# Patient Record
Sex: FEMALE | Race: WHITE | NOT HISPANIC OR LATINO | Employment: OTHER | URBAN - METROPOLITAN AREA
[De-identification: names, ages, dates, MRNs, and addresses within clinical notes are randomized per-mention and may not be internally consistent; named-entity substitution may affect disease eponyms.]

---

## 2017-01-26 ENCOUNTER — HOSPITAL ENCOUNTER (OUTPATIENT)
Dept: RADIOLOGY | Facility: MEDICAL CENTER | Age: 77
End: 2017-01-26
Attending: ORTHOPAEDIC SURGERY
Payer: MEDICARE

## 2017-01-26 DIAGNOSIS — M25.511 RIGHT SHOULDER PAIN, UNSPECIFIED CHRONICITY: ICD-10-CM

## 2017-01-26 PROCEDURE — 73221 MRI JOINT UPR EXTREM W/O DYE: CPT | Mod: RT

## 2017-02-07 DIAGNOSIS — Z01.812 PRE-OPERATIVE LABORATORY EXAMINATION: ICD-10-CM

## 2017-02-07 DIAGNOSIS — Z01.810 PRE-OPERATIVE CARDIOVASCULAR EXAMINATION: ICD-10-CM

## 2017-02-07 LAB
ANION GAP SERPL CALC-SCNC: 9 MMOL/L (ref 0–11.9)
BUN SERPL-MCNC: 19 MG/DL (ref 8–22)
CALCIUM SERPL-MCNC: 8.6 MG/DL (ref 8.4–10.2)
CHLORIDE SERPL-SCNC: 102 MMOL/L (ref 96–112)
CO2 SERPL-SCNC: 25 MMOL/L (ref 20–33)
CREAT SERPL-MCNC: 0.77 MG/DL (ref 0.5–1.4)
EKG IMPRESSION: NORMAL
GFR SERPL CREATININE-BSD FRML MDRD: >60 ML/MIN/1.73 M 2
GLUCOSE SERPL-MCNC: 85 MG/DL (ref 65–99)
POTASSIUM SERPL-SCNC: 4.1 MMOL/L (ref 3.6–5.5)
SODIUM SERPL-SCNC: 136 MMOL/L (ref 135–145)

## 2017-02-07 PROCEDURE — 36415 COLL VENOUS BLD VENIPUNCTURE: CPT

## 2017-02-07 PROCEDURE — 80048 BASIC METABOLIC PNL TOTAL CA: CPT

## 2017-02-07 RX ORDER — IBUPROFEN 200 MG
200 TABLET ORAL 2 TIMES DAILY
COMMUNITY
End: 2017-02-07

## 2017-02-07 RX ORDER — IBUPROFEN 200 MG
200 TABLET ORAL 2 TIMES DAILY
Status: ON HOLD | COMMUNITY
End: 2017-02-18

## 2017-02-16 ENCOUNTER — APPOINTMENT (OUTPATIENT)
Dept: RADIOLOGY | Facility: MEDICAL CENTER | Age: 77
End: 2017-02-16
Attending: HOSPITALIST
Payer: MEDICARE

## 2017-02-16 ENCOUNTER — HOSPITAL ENCOUNTER (OUTPATIENT)
Facility: MEDICAL CENTER | Age: 77
End: 2017-02-18
Attending: ORTHOPAEDIC SURGERY | Admitting: ORTHOPAEDIC SURGERY
Payer: MEDICARE

## 2017-02-16 ENCOUNTER — RESOLUTE PROFESSIONAL BILLING HOSPITAL PROF FEE (OUTPATIENT)
Dept: HOSPITALIST | Facility: MEDICAL CENTER | Age: 77
End: 2017-02-16
Payer: MEDICARE

## 2017-02-16 PROBLEM — I16.0 HYPERTENSIVE URGENCY: Status: ACTIVE | Noted: 2017-02-16

## 2017-02-16 PROBLEM — M25.511 RIGHT SHOULDER PAIN: Status: ACTIVE | Noted: 2017-02-16

## 2017-02-16 LAB
ALBUMIN SERPL BCP-MCNC: 3.9 G/DL (ref 3.2–4.9)
ALBUMIN/GLOB SERPL: 1.2 G/DL
ALP SERPL-CCNC: 59 U/L (ref 30–99)
ALT SERPL-CCNC: 17 U/L (ref 2–50)
ANION GAP SERPL CALC-SCNC: 10 MMOL/L (ref 0–11.9)
AST SERPL-CCNC: 21 U/L (ref 12–45)
BASOPHILS # BLD AUTO: 0.6 % (ref 0–1.8)
BASOPHILS # BLD: 0.05 K/UL (ref 0–0.12)
BILIRUB SERPL-MCNC: 0.5 MG/DL (ref 0.1–1.5)
BUN SERPL-MCNC: 14 MG/DL (ref 8–22)
CALCIUM SERPL-MCNC: 9.1 MG/DL (ref 8.4–10.2)
CHLORIDE SERPL-SCNC: 106 MMOL/L (ref 96–112)
CO2 SERPL-SCNC: 22 MMOL/L (ref 20–33)
CREAT SERPL-MCNC: 0.66 MG/DL (ref 0.5–1.4)
EKG IMPRESSION: NORMAL
EOSINOPHIL # BLD AUTO: 0.07 K/UL (ref 0–0.51)
EOSINOPHIL NFR BLD: 0.9 % (ref 0–6.9)
ERYTHROCYTE [DISTWIDTH] IN BLOOD BY AUTOMATED COUNT: 42.5 FL (ref 35.9–50)
GFR SERPL CREATININE-BSD FRML MDRD: >60 ML/MIN/1.73 M 2
GLOBULIN SER CALC-MCNC: 3.2 G/DL (ref 1.9–3.5)
GLUCOSE SERPL-MCNC: 130 MG/DL (ref 65–99)
HCT VFR BLD AUTO: 33.2 % (ref 37–47)
HGB BLD-MCNC: 10.8 G/DL (ref 12–16)
IMM GRANULOCYTES # BLD AUTO: 0.03 K/UL (ref 0–0.11)
IMM GRANULOCYTES NFR BLD AUTO: 0.4 % (ref 0–0.9)
LYMPHOCYTES # BLD AUTO: 1.67 K/UL (ref 1–4.8)
LYMPHOCYTES NFR BLD: 21.6 % (ref 22–41)
MCH RBC QN AUTO: 27 PG (ref 27–33)
MCHC RBC AUTO-ENTMCNC: 32.5 G/DL (ref 33.6–35)
MCV RBC AUTO: 83 FL (ref 81.4–97.8)
MONOCYTES # BLD AUTO: 0.36 K/UL (ref 0–0.85)
MONOCYTES NFR BLD AUTO: 4.7 % (ref 0–13.4)
NEUTROPHILS # BLD AUTO: 5.54 K/UL (ref 2–7.15)
NEUTROPHILS NFR BLD: 71.8 % (ref 44–72)
NRBC # BLD AUTO: 0 K/UL
NRBC BLD AUTO-RTO: 0 /100 WBC
PLATELET # BLD AUTO: 337 K/UL (ref 164–446)
PMV BLD AUTO: 9.7 FL (ref 9–12.9)
POTASSIUM SERPL-SCNC: 3.5 MMOL/L (ref 3.6–5.5)
PROT SERPL-MCNC: 7.1 G/DL (ref 6–8.2)
RBC # BLD AUTO: 4 M/UL (ref 4.2–5.4)
SODIUM SERPL-SCNC: 138 MMOL/L (ref 135–145)
TROPONIN I SERPL-MCNC: 0.03 NG/ML (ref 0–0.04)
TROPONIN I SERPL-MCNC: <0.02 NG/ML (ref 0–0.04)
WBC # BLD AUTO: 7.7 K/UL (ref 4.8–10.8)

## 2017-02-16 PROCEDURE — 700111 HCHG RX REV CODE 636 W/ 250 OVERRIDE (IP): Performed by: HOSPITALIST

## 2017-02-16 PROCEDURE — 700102 HCHG RX REV CODE 250 W/ 637 OVERRIDE(OP): Performed by: HOSPITALIST

## 2017-02-16 PROCEDURE — 93010 ELECTROCARDIOGRAM REPORT: CPT | Mod: 77 | Performed by: INTERNAL MEDICINE

## 2017-02-16 PROCEDURE — 93010 ELECTROCARDIOGRAM REPORT: CPT | Performed by: INTERNAL MEDICINE

## 2017-02-16 PROCEDURE — 96375 TX/PRO/DX INJ NEW DRUG ADDON: CPT

## 2017-02-16 PROCEDURE — 99220 PR INITIAL OBSERVATION CARE,LEVL III: CPT | Performed by: HOSPITALIST

## 2017-02-16 PROCEDURE — 80053 COMPREHEN METABOLIC PANEL: CPT

## 2017-02-16 PROCEDURE — 70450 CT HEAD/BRAIN W/O DYE: CPT

## 2017-02-16 PROCEDURE — 84484 ASSAY OF TROPONIN QUANT: CPT

## 2017-02-16 PROCEDURE — 85025 COMPLETE CBC W/AUTO DIFF WBC: CPT

## 2017-02-16 PROCEDURE — 700101 HCHG RX REV CODE 250: Performed by: HOSPITALIST

## 2017-02-16 PROCEDURE — 93005 ELECTROCARDIOGRAM TRACING: CPT | Performed by: HOSPITALIST

## 2017-02-16 PROCEDURE — 700101 HCHG RX REV CODE 250

## 2017-02-16 PROCEDURE — 96374 THER/PROPH/DIAG INJ IV PUSH: CPT

## 2017-02-16 PROCEDURE — 700111 HCHG RX REV CODE 636 W/ 250 OVERRIDE (IP)

## 2017-02-16 PROCEDURE — G0378 HOSPITAL OBSERVATION PER HR: HCPCS

## 2017-02-16 PROCEDURE — A9270 NON-COVERED ITEM OR SERVICE: HCPCS | Performed by: HOSPITALIST

## 2017-02-16 RX ORDER — LISINOPRIL 5 MG/1
10 TABLET ORAL DAILY
Status: DISCONTINUED | OUTPATIENT
Start: 2017-02-16 | End: 2017-02-16

## 2017-02-16 RX ORDER — ONDANSETRON 2 MG/ML
4 INJECTION INTRAMUSCULAR; INTRAVENOUS EVERY 6 HOURS PRN
Status: DISCONTINUED | OUTPATIENT
Start: 2017-02-16 | End: 2017-02-18 | Stop reason: HOSPADM

## 2017-02-16 RX ORDER — LABETALOL HYDROCHLORIDE 5 MG/ML
20 INJECTION, SOLUTION INTRAVENOUS ONCE
Status: ACTIVE | OUTPATIENT
Start: 2017-02-16 | End: 2017-02-17

## 2017-02-16 RX ORDER — HYDRALAZINE HYDROCHLORIDE 20 MG/ML
20 INJECTION INTRAMUSCULAR; INTRAVENOUS EVERY 6 HOURS PRN
Status: DISCONTINUED | OUTPATIENT
Start: 2017-02-16 | End: 2017-02-18 | Stop reason: HOSPADM

## 2017-02-16 RX ORDER — HYDROCHLOROTHIAZIDE 25 MG/1
12.5 TABLET ORAL
Status: DISCONTINUED | OUTPATIENT
Start: 2017-02-16 | End: 2017-02-18 | Stop reason: HOSPADM

## 2017-02-16 RX ORDER — LISINOPRIL 5 MG/1
30 TABLET ORAL DAILY
Status: DISCONTINUED | OUTPATIENT
Start: 2017-02-17 | End: 2017-02-17

## 2017-02-16 RX ORDER — DOCUSATE SODIUM 100 MG/1
100 CAPSULE, LIQUID FILLED ORAL 2 TIMES DAILY
Status: DISCONTINUED | OUTPATIENT
Start: 2017-02-16 | End: 2017-02-18 | Stop reason: HOSPADM

## 2017-02-16 RX ORDER — LABETALOL HYDROCHLORIDE 5 MG/ML
10 INJECTION, SOLUTION INTRAVENOUS EVERY 4 HOURS PRN
Status: DISCONTINUED | OUTPATIENT
Start: 2017-02-16 | End: 2017-02-18 | Stop reason: HOSPADM

## 2017-02-16 RX ORDER — LISINOPRIL 20 MG/1
20 TABLET ORAL ONCE
Status: DISCONTINUED | OUTPATIENT
Start: 2017-02-16 | End: 2017-02-17

## 2017-02-16 RX ORDER — LABETALOL HYDROCHLORIDE 5 MG/ML
INJECTION, SOLUTION INTRAVENOUS
Status: COMPLETED
Start: 2017-02-16 | End: 2017-02-16

## 2017-02-16 RX ORDER — ACETAMINOPHEN 325 MG/1
650 TABLET ORAL EVERY 4 HOURS PRN
Status: DISCONTINUED | OUTPATIENT
Start: 2017-02-16 | End: 2017-02-18 | Stop reason: HOSPADM

## 2017-02-16 RX ORDER — ONDANSETRON 4 MG/1
4 TABLET, ORALLY DISINTEGRATING ORAL EVERY 6 HOURS PRN
Status: DISCONTINUED | OUTPATIENT
Start: 2017-02-16 | End: 2017-02-18 | Stop reason: HOSPADM

## 2017-02-16 RX ORDER — HYDRALAZINE HYDROCHLORIDE 20 MG/ML
INJECTION INTRAMUSCULAR; INTRAVENOUS
Status: COMPLETED
Start: 2017-02-16 | End: 2017-02-16

## 2017-02-16 RX ORDER — SODIUM CHLORIDE, SODIUM LACTATE, POTASSIUM CHLORIDE, CALCIUM CHLORIDE 600; 310; 30; 20 MG/100ML; MG/100ML; MG/100ML; MG/100ML
INJECTION, SOLUTION INTRAVENOUS
Status: DISCONTINUED | OUTPATIENT
Start: 2017-02-16 | End: 2017-02-16

## 2017-02-16 RX ADMIN — LISINOPRIL 10 MG: 5 TABLET ORAL at 14:46

## 2017-02-16 RX ADMIN — SODIUM CHLORIDE, SODIUM LACTATE, POTASSIUM CHLORIDE, CALCIUM CHLORIDE 1000 ML: 600; 310; 30; 20 INJECTION, SOLUTION INTRAVENOUS at 11:15

## 2017-02-16 RX ADMIN — LABETALOL HYDROCHLORIDE 10 MG: 5 INJECTION, SOLUTION INTRAVENOUS at 12:30

## 2017-02-16 RX ADMIN — HYDRALAZINE HYDROCHLORIDE 20 MG: 20 INJECTION, SOLUTION INTRAMUSCULAR; INTRAVENOUS at 12:15

## 2017-02-16 RX ADMIN — HYDRALAZINE HYDROCHLORIDE 20 MG: 20 INJECTION INTRAMUSCULAR; INTRAVENOUS at 19:23

## 2017-02-16 RX ADMIN — ACETAMINOPHEN 650 MG: 325 TABLET, FILM COATED ORAL at 23:46

## 2017-02-16 RX ADMIN — ACETAMINOPHEN 650 MG: 325 TABLET, FILM COATED ORAL at 17:43

## 2017-02-16 RX ADMIN — LABETALOL HYDROCHLORIDE 10 MG: 5 INJECTION, SOLUTION INTRAVENOUS at 17:17

## 2017-02-16 RX ADMIN — HYDRALAZINE HYDROCHLORIDE 20 MG: 20 INJECTION INTRAMUSCULAR; INTRAVENOUS at 12:15

## 2017-02-16 RX ADMIN — ONDANSETRON 4 MG: 2 INJECTION, SOLUTION INTRAMUSCULAR; INTRAVENOUS at 21:15

## 2017-02-16 ASSESSMENT — PAIN SCALES - GENERAL
PAINLEVEL_OUTOF10: 5
PAINLEVEL_OUTOF10: 2

## 2017-02-16 NOTE — IP AVS SNAPSHOT
eMagin Access Code: [email or web address]  Expires: 3/20/2017 11:05 AM    eMagin  A secure, online tool to manage your health information     [employer]’s eMagin® is a secure, online tool that connects you to your personalized health information from the privacy of your home -- day or night - making it very easy for you to manage your healthcare. Once the activation process is completed, you can even access your medical information using the eMagin abbie, which is available for free in the Apple Abbie store or Google Play store.     eMagin provides the following levels of access (as shown below):   My Chart Features   [facility] Primary Care Doctor [facility]  Specialists [facility]  Urgent  Care Non-[facility]  Primary Care  Doctor   Email your healthcare team securely and privately 24/7 X X X X   Manage appointments: schedule your next appointment; view details of past/upcoming appointments X      Request prescription refills. X      View recent personal medical records, including lab and immunizations X X X X   View health record, including health history, allergies, medications X X X X   Read reports about your outpatient visits, procedures, consult and ER notes X X X X   See your discharge summary, which is a recap of your hospital and/or ER visit that includes your diagnosis, lab results, and care plan. X X       How to register for eMagin:  1. Go to  [email or web address].  2. Click on the Sign Up Now box, which takes you to the New Member Sign Up page. You will need to provide the following information:  a. Enter your eMagin Access Code exactly as it appears at the top of this page. (You will not need to use this code after you’ve completed the sign-up process. If you do not sign up before the expiration date, you must request a new code.)   b. Enter your date of birth.   c. Enter your home email address.   d. Click Submit, and follow the next screen’s instructions.  3. Create a eMagin ID. This will be your eMagin  login ID and cannot be changed, so think of one that is secure and easy to remember.  4. Create a [employer] password. You can change your password at any time.  5. Enter your Password Reset Question and Answer. This can be used at a later time if you forget your password.   6. Enter your e-mail address. This allows you to receive e-mail notifications when new information is available in SPIRIT Navigation.  7. Click Sign Up. You can now view your health information.    For assistance activating your SPIRIT Navigation account, call [phone]

## 2017-02-16 NOTE — IP AVS SNAPSHOT
2/18/2017          Nafisa Hancock  [address] [place] [address]    Dear Nafisa:    [facility] wants to ensure your discharge home is safe and you or your loved ones have had all your questions answered regarding your care after you leave the hospital.    You may receive a telephone call within two days of your discharge.  This call is to make certain you understand your discharge instructions as well as ensure we provided you with the best care possible during your stay with us.     The call will only last approximately 3-5 minutes and will be done by a nurse.    Once again, we want to ensure your discharge home is safe and that you have a clear understanding of any next steps in your care.  If you have any questions or concerns, please do not hesitate to contact us, we are here for you.  Thank you for choosing [facility] for your healthcare needs.    Sincerely,    Bryan Barbosa    [facility]

## 2017-02-16 NOTE — OR NURSING
1320- Pt eating lunch, denies pain, VSS.  1345- Pt sleeping, VSS, flushed face, changed into cloth gown, keeping Pt updated on POC.    1410- Room assigned, report called to Filomena HERNANDES, Pt transported on monitor to room 303-1.

## 2017-02-16 NOTE — OR NURSING
"930- Patient allergies and NPO status verified, home medication reconciliation completed and belongings secured. Patient verbalizes understanding of pain scale, expected course of stay and plan of care. Surgical site verified with patient. Patient and family given home care instructions sheet for discharge planning.  Elevated B/P will recheck.  940- B/P remains elevated, called placed to anesthesia.  1000- Pt relaxed and in bed, friend at bedside.  Recheck B/P still remains elevated. Surgeon and anesthesia to come speak with her.  Pt states \"no headache or weakness.\"  1030- case cancelled and will notify hospitalist.    1100- waiting return call, Pt resting comfortably.  1130- IV started, on monitor, sinus rhythm ,waiting for orders.  1215- Hospitalist at bedside with pt.  Medicated per orders.  1230- EKG complete, pt resting.  1245- friend at bedside, hand off to Kandi HERNANDES.    "

## 2017-02-16 NOTE — IP AVS SNAPSHOT
" <p align=\"LEFT\"><IMG SRC=\"//[email or web address]$/[email or web address]\" alt=\"Image\" WIDTH=\"50%\" HEIGHT=\"200\" BORDER=\"\"></p>                   Name:Nafisa Hancock  Medical Record Number:[id number]  CSN: [id number]    YOB: 1940   Age: 76 y.o.  Sex: female  HT:1.651 m (5' 5\") WT: 63 kg (138 lb 14.2 oz)          Admit Date: 2/16/2017     Discharge Date:   Today's Date: 2/18/2017  Attending Doctor:  Magdiel Lopez M.D.                  Allergies:  Nkda          Your appointments     Feb 23, 2017  1:40 PM   Established Patient with James Reyes M.D.   [address] ([address])    [address] [place] [address]  [facility] [id number]   [phone]           You will be receiving a confirmation call a few days before your appointment from our automated call confirmation system.                 Medication List      Take these Medications        Instructions    CALCIUM + D PO    Take 1,200 mg by mouth every day.   Dose:  1200 mg       hydrochlorothiazide 12.5 MG tablet   Commonly known as:  HYDRODIURIL    Take 1 Tab by mouth every day.   Dose:  12.5 mg       lisinopril 40 MG tablet   What changed:    - medication strength  - how much to take   Commonly known as:  PRINIVIL, ZESTRIL    Take 1 Tab by mouth every day.   Dose:  40 mg       medroxyPROGESTERone 5 MG Tabs   Commonly known as:  PROVERA    Take 2.5 mg by mouth every day. Daily x10d per month   Dose:  2.5 mg       MULTIVITAMINS PO    Take  by mouth every day.       PREMARIN 0.45 MG Tabs   Generic drug:  Estrogens Conjugated    Take  by mouth every day.       VITAMIN B-12 PO    Take 500 mcg by mouth every day.   Dose:  500 mcg         "

## 2017-02-17 LAB
EKG IMPRESSION: NORMAL
LV EJECT FRACT  99904: 55
LV EJECT FRACT MOD 2C 99903: 54.69
LV EJECT FRACT MOD 4C 99902: 55.94
LV EJECT FRACT MOD BP 99901: 56.36

## 2017-02-17 PROCEDURE — 700111 HCHG RX REV CODE 636 W/ 250 OVERRIDE (IP): Performed by: HOSPITALIST

## 2017-02-17 PROCEDURE — 99225 PR SUBSEQUENT OBSERVATION CARE,LEVEL II: CPT | Performed by: INTERNAL MEDICINE

## 2017-02-17 PROCEDURE — 700102 HCHG RX REV CODE 250 W/ 637 OVERRIDE(OP): Performed by: HOSPITALIST

## 2017-02-17 PROCEDURE — A9270 NON-COVERED ITEM OR SERVICE: HCPCS | Performed by: INTERNAL MEDICINE

## 2017-02-17 PROCEDURE — 93306 TTE W/DOPPLER COMPLETE: CPT | Mod: 26 | Performed by: INTERNAL MEDICINE

## 2017-02-17 PROCEDURE — 96376 TX/PRO/DX INJ SAME DRUG ADON: CPT | Mod: XU

## 2017-02-17 PROCEDURE — A9270 NON-COVERED ITEM OR SERVICE: HCPCS | Performed by: HOSPITALIST

## 2017-02-17 PROCEDURE — 93306 TTE W/DOPPLER COMPLETE: CPT

## 2017-02-17 PROCEDURE — 700102 HCHG RX REV CODE 250 W/ 637 OVERRIDE(OP): Performed by: INTERNAL MEDICINE

## 2017-02-17 PROCEDURE — G0378 HOSPITAL OBSERVATION PER HR: HCPCS

## 2017-02-17 RX ORDER — LISINOPRIL 5 MG/1
10 TABLET ORAL ONCE
Status: COMPLETED | OUTPATIENT
Start: 2017-02-17 | End: 2017-02-17

## 2017-02-17 RX ORDER — BUTALBITAL, ACETAMINOPHEN AND CAFFEINE 50; 325; 40 MG/1; MG/1; MG/1
1 TABLET ORAL EVERY 6 HOURS PRN
Status: DISCONTINUED | OUTPATIENT
Start: 2017-02-17 | End: 2017-02-18 | Stop reason: HOSPADM

## 2017-02-17 RX ORDER — POTASSIUM CHLORIDE 20 MEQ/1
40 TABLET, EXTENDED RELEASE ORAL 2 TIMES DAILY
Status: COMPLETED | OUTPATIENT
Start: 2017-02-17 | End: 2017-02-17

## 2017-02-17 RX ORDER — LISINOPRIL 20 MG/1
40 TABLET ORAL
Status: DISCONTINUED | OUTPATIENT
Start: 2017-02-18 | End: 2017-02-18 | Stop reason: HOSPADM

## 2017-02-17 RX ADMIN — BUTALBITAL, ACETAMINOPHEN, AND CAFFEINE 1 TABLET: 50; 325; 40 TABLET ORAL at 03:01

## 2017-02-17 RX ADMIN — BUTALBITAL, ACETAMINOPHEN, AND CAFFEINE 1 TABLET: 50; 325; 40 TABLET ORAL at 22:27

## 2017-02-17 RX ADMIN — HYDROCHLOROTHIAZIDE 12.5 MG: 25 TABLET ORAL at 09:12

## 2017-02-17 RX ADMIN — POTASSIUM CHLORIDE 40 MEQ: 1500 TABLET, EXTENDED RELEASE ORAL at 15:56

## 2017-02-17 RX ADMIN — LISINOPRIL 10 MG: 5 TABLET ORAL at 15:55

## 2017-02-17 RX ADMIN — HYDRALAZINE HYDROCHLORIDE 20 MG: 20 INJECTION INTRAMUSCULAR; INTRAVENOUS at 19:47

## 2017-02-17 RX ADMIN — LISINOPRIL 30 MG: 5 TABLET ORAL at 11:02

## 2017-02-17 ASSESSMENT — PAIN SCALES - GENERAL
PAINLEVEL_OUTOF10: 6
PAINLEVEL_OUTOF10: 5
PAINLEVEL_OUTOF10: 0

## 2017-02-17 NOTE — PROGRESS NOTES
Report received. Care assumed. BP rechecked 201/77, Hydralazine given by Aliya HERNANDES. No c/o SOB, chest pain, headache at this time. Assessment completed. EKG done. Ambulate to bathroom to void, standby assist gait steady. Call light in reach.

## 2017-02-17 NOTE — PROGRESS NOTES
Continues to c/o headache. New order received, medicated per MAR. /70's. Call light in reach.     Tele Report:   Sinus Rhythm/ Sinus Tach  Rare PVC  HR   AR 0.16  QRS 0.10  QT 0.36

## 2017-02-17 NOTE — PROGRESS NOTES
Resting with eyes closed. Respirations even and unlabored. /56. Tylenol given for headache at 0000, no further c/o. No further c/o nausea. Call light in reach.

## 2017-02-17 NOTE — PROGRESS NOTES
C/o nausea. Dr. Castro notified, orders received. Medicated per MAR. Assist to bathroom to void and have BM. BP remains 170/90's. Call light in reach.

## 2017-02-17 NOTE — PROGRESS NOTES
Resting with eyes closed. Respirations even and unlabored. Repositions self. No further c/o pain. Call light in reach.

## 2017-02-17 NOTE — PROGRESS NOTES
To CT via w/c, tolerated well. No further c/o nausea or vomiting. Refused additional BP meds as /66. Denies headache at this time. Call light in reach.

## 2017-02-17 NOTE — PROGRESS NOTES
Hospital Medicine Progress Note, Adult, Complex               Author: Magdiel Lopez Date & Time created: 2/17/2017  1:39 PM   CC: hypertensive urgency  Interval History:  Nafisa Hancock is a 76 year old woman who was admitted last night for HTNsive urgency. She was admitted for an elective R rotator cuff repair. BP didn't improve As an outpt, she was getting Lisinopril 10 mg po daily. She was asymptomatic. Labs were unremarkable except for K of 3.5, RBS of 130, Hgb 10.8, . Troponins were normal. EKG showed NSR , early transition, NS ST changes. Lisinopril was restarted but the dose was increased to 30 daily. HCTZ was added as well.     Review of Systems:  ROS   Pertinent positives/negative as mentioned above.     A complete review of systems was done. All other systems were negative.     Physical Exam:  Physical Exam   Constitutional: Well-developed, well-nourished, (-) diaphoresis, (-) distress  HENT: Normocephalic, atraumatic, (-) tonsillopharyngal congestion, (-) tonsillopharyngeal exudate  Eyes: PERRLA, pink conjuctivae, (-) icteric sclerae  Neck: (-) cervical lymphadenopathy, (-) rigidity  Cardiovascular: RRR, (-) murmurs, (-) rubs, (-) gallops, (-) edema  Pulmonary: Equal chest expansion, (+) clear breath sounds, (-) wheezes/rhonchi, (-) crackles/rales  Abdominal: (+) bowel sounds, soft, (-) tenderness, (-) masses, (-) guarding/rebound  Musculoskeletal: (-) joint swelling, (-) joint tenderness, (-) joint deformities, (-) muscle tenderness, (-) gross limitation of movement of 4 extremities  Neurologic: Non-focal, moves all 4 extremities, sensory grossly intact  Skin: (-) erythema, (-) warmth, (-) rashes, (-) ulcers, (-) open wounds  Psychiatric: mood/affect WNL, thought content WNL, behavior age appropriate    Labs:        Invalid input(s): [id number]  Recent Labs      02/16/17   1255  02/16/17   1834   TROPONINI  <0.02  0.03     Recent Labs      02/16/17   1255   SODIUM  138   POTASSIUM  3.5*   CHLORIDE   106   CO2  22   BUN  14   CREATININE  0.66   CALCIUM  9.1     Recent Labs      17   1255   ALTSGPT  17   ASTSGOT  21   ALKPHOSPHAT  59   TBILIRUBIN  0.5   GLUCOSE  130*     Recent Labs      17   1255   RBC  4.00*   HEMOGLOBIN  10.8*   HEMATOCRIT  33.2*   PLATELETCT  337     Recent Labs      17   1255   WBC  7.7   NEUTSPOLYS  71.80   LYMPHOCYTES  21.60*   MONOCYTES  4.70   EOSINOPHILS  0.90   BASOPHILS  0.60   ASTSGOT  21   ALTSGPT  17   ALKPHOSPHAT  59   TBILIRUBIN  0.5           Hemodynamics:  Temp (24hrs), Av.9 °C (98.5 °F), Min:36.8 °C (98.2 °F), Max:37.1 °C (98.8 °F)  Temperature:  (95.6)  Pulse  Av.5  Min: 68  Max: 110Heart Rate (Monitored): 98  Blood Pressure : (!) 172/69 mmHg     Respiratory:    Respiration: 18, Pulse Oximetry:  (95% room air)           Fluids:    Intake/Output Summary (Last 24 hours) at 17 1339  Last data filed at 17 0500   Gross per 24 hour   Intake    770 ml   Output      0 ml   Net    770 ml     Weight: 63 kg (138 lb 14.2 oz)  GI/Nutrition:  Orders Placed This Encounter   Procedures   • Diet Order     Standing Status: Standing      Number of Occurrences: 1      Standing Expiration Date:      Order Specific Question:  Diet:     Answer:  Regular [1]     Medical Decision Making, by Problem:  Active Hospital Problems    Diagnosis   • Hypertensive urgency [I16.0]  hypokalemia  Inc lisnopril 30 dly  HCTZ 12.5 daily  PRN Labetalol and Hydralazine.   ECHO was ordered  Replace K  Check BMP,MG tomorrow  telemetry     • Right shoulder pain [M25.511]       Core Measures  SCD  Full code

## 2017-02-17 NOTE — PROGRESS NOTES
Patient c/o headache 5/10 BP at this time (1713) 182/111 and HR 99. Ronit hospitalist RN notified, PRN 10 mg labatolol given IVP and order for tylenol received for headache. BP recheched at 1735 now 175/93 and HR 81 will recheck BP again in 15 minutes

## 2017-02-18 VITALS
TEMPERATURE: 98.7 F | DIASTOLIC BLOOD PRESSURE: 64 MMHG | HEIGHT: 65 IN | OXYGEN SATURATION: 93 % | RESPIRATION RATE: 18 BRPM | BODY MASS INDEX: 23.14 KG/M2 | HEART RATE: 94 BPM | SYSTOLIC BLOOD PRESSURE: 142 MMHG | WEIGHT: 138.89 LBS

## 2017-02-18 PROBLEM — I16.0 HYPERTENSIVE URGENCY: Status: RESOLVED | Noted: 2017-02-16 | Resolved: 2017-02-18

## 2017-02-18 LAB
ANION GAP SERPL CALC-SCNC: 9 MMOL/L (ref 0–11.9)
BUN SERPL-MCNC: 12 MG/DL (ref 8–22)
CALCIUM SERPL-MCNC: 9.2 MG/DL (ref 8.4–10.2)
CHLORIDE SERPL-SCNC: 103 MMOL/L (ref 96–112)
CO2 SERPL-SCNC: 24 MMOL/L (ref 20–33)
CREAT SERPL-MCNC: 0.84 MG/DL (ref 0.5–1.4)
GFR SERPL CREATININE-BSD FRML MDRD: >60 ML/MIN/1.73 M 2
GLUCOSE SERPL-MCNC: 108 MG/DL (ref 65–99)
MAGNESIUM SERPL-MCNC: 1.8 MG/DL (ref 1.5–2.5)
POTASSIUM SERPL-SCNC: 4.2 MMOL/L (ref 3.6–5.5)
SODIUM SERPL-SCNC: 136 MMOL/L (ref 135–145)

## 2017-02-18 PROCEDURE — A9270 NON-COVERED ITEM OR SERVICE: HCPCS | Performed by: HOSPITALIST

## 2017-02-18 PROCEDURE — 99217 PR OBSERVATION CARE DISCHARGE: CPT | Performed by: INTERNAL MEDICINE

## 2017-02-18 PROCEDURE — 80048 BASIC METABOLIC PNL TOTAL CA: CPT

## 2017-02-18 PROCEDURE — 700102 HCHG RX REV CODE 250 W/ 637 OVERRIDE(OP): Performed by: HOSPITALIST

## 2017-02-18 PROCEDURE — 700102 HCHG RX REV CODE 250 W/ 637 OVERRIDE(OP): Performed by: INTERNAL MEDICINE

## 2017-02-18 PROCEDURE — A9270 NON-COVERED ITEM OR SERVICE: HCPCS | Performed by: INTERNAL MEDICINE

## 2017-02-18 PROCEDURE — G0378 HOSPITAL OBSERVATION PER HR: HCPCS

## 2017-02-18 PROCEDURE — 83735 ASSAY OF MAGNESIUM: CPT

## 2017-02-18 RX ORDER — LISINOPRIL 40 MG/1
40 TABLET ORAL DAILY
Qty: 30 TAB | Refills: 0 | Status: SHIPPED | OUTPATIENT
Start: 2017-02-18 | End: 2017-02-23 | Stop reason: SDUPTHER

## 2017-02-18 RX ORDER — HYDROCHLOROTHIAZIDE 12.5 MG/1
12.5 TABLET ORAL DAILY
Qty: 30 TAB | Refills: 0 | Status: SHIPPED | OUTPATIENT
Start: 2017-02-18 | End: 2017-02-23 | Stop reason: SDUPTHER

## 2017-02-18 RX ADMIN — HYDROCHLOROTHIAZIDE 12.5 MG: 25 TABLET ORAL at 08:39

## 2017-02-18 RX ADMIN — ACETAMINOPHEN 650 MG: 325 TABLET, FILM COATED ORAL at 08:40

## 2017-02-18 RX ADMIN — LISINOPRIL 40 MG: 20 TABLET ORAL at 08:39

## 2017-02-18 RX ADMIN — ACETAMINOPHEN 650 MG: 325 TABLET, FILM COATED ORAL at 03:32

## 2017-02-18 ASSESSMENT — LIFESTYLE VARIABLES
EVER_SMOKED: NEVER
EVER_SMOKED: NEVER
ALCOHOL_USE: NO

## 2017-02-18 ASSESSMENT — PAIN SCALES - GENERAL: PAINLEVEL_OUTOF10: 6

## 2017-02-18 NOTE — PROGRESS NOTES
Report received. Care assumed. Resting in bed. Alert and oriented. Respirations even and unlabored. No c/o. Call light in reach.

## 2017-02-18 NOTE — PROGRESS NOTES
Resting with eyes closed. Respirations even and unlabored. No further c/o pain. Ambulate to bathroom as needed, gait steady, calls for assist as needed. Call light in reach.

## 2017-02-18 NOTE — DISCHARGE SUMMARY
HOSPITAL MEDICINE DISCHARGE SUMMARY    Name: Nafisa Hancock  MRN: [id number]  : 1940  Admit Date: 2017  Discharge Date: 2017  Attending Provider: Magdiel Lopez M.D.  Primary Care Physician: James Reyes M.D.    DISCHARGE DIAGNOSES:   Active Problems:    Right shoulder pain POA: Yes  Resolved Problems:    Hypertensive urgency POA: Yes        SUMMARY OF EVENTS LEADING TO ADMISSION:   76 year old woman who was admitted or HTNsive urgency     For further details of history of present illness, past medical/social/family histories, allergies and medication, please refer to copy of admission note by Dr. Castro on 17.     HOSPITAL COURSE:   The patient was admitted to the hospitalist service  after she presented with Hypertensive urgency. She was supposed to undergo an elective R rotator cuff repair but her BP was more than 200 systolic.  She is known to have HTN and was on Lisinopril 10 mg at home. Troponins were normal. EKG showed NSR , early transition, NS ST changes.     Lisinopril was increased to 40 daily. HCTZ 12.5 mg was added as well. BP this moring is down in the 140's. She is asymptomatic. She requested to go home.       With her clinical improvement, she was deemed ready to be discharged from the hospital as she did not have any further inpatient needs. Patient felt comfortable going home. The discharge plan was discussed with the patient, and she was agreeable to it.     The patient was subsequently sent home in improved and stable condition.     FOLLOW-UP ISSUES:   BP     CHANGES IN MANAGEMENT OF CHRONIC CONDITION: As above.         DISCHARGE MEDICATIONS:   Prior to Admission medications    Medication Sig Start Date End Date Taking? Authorizing Provider   lisinopril (PRINIVIL, ZESTRIL) 40 MG tablet Take 1 Tab by mouth every day. 17  Yes Magdiel Lopez M.D.   hydrochlorothiazide (HYDRODIURIL) 12.5 MG tablet Take 1 Tab by mouth every day. 17  Yes  Magdiel Lopez M.D.   Calcium Carbonate-Vitamin D (CALCIUM + D PO) Take 1,200 mg by mouth every day.    Srg Physician   medroxyPROGESTERone (PROVERA) 5 MG TABS Take 2.5 mg by mouth every day. Daily x10d per month    Srg Physician   Estrogens Conjugated (PREMARIN) 0.45 MG TABS Take  by mouth every day.    Srg Physician   Multiple Vitamin (MULTIVITAMINS PO) Take  by mouth every day.    Srg Physician   Cyanocobalamin (VITAMIN B-12 PO) Take 500 mcg by mouth every day.    Srg Physician          FOLLOW-UP APPOINTMENTS:   1. James Reyes M.D.in a week        For further details on discharge medications, patient education, diet, and activity, please refer to electronic copy of discharge instructions.       TIME SPENT: 25 minutes, with greater than 50% of the time spent on face-to-face encounter, addressing medical issues, coordination of care, counseling, discharge planning, medication reconciliation, and documentation.

## 2017-02-18 NOTE — PROGRESS NOTES
Refuse due senna. IV hydralazine given at 2000, BP recheck now 147/47. No c/o. Ambulate to bathroom as needed. Call light in reach.

## 2017-02-18 NOTE — PROGRESS NOTES
Alert and oriented, complain of headache, tylenol given, VSS, POC discussed.    Seen by MD possible D/C today.

## 2017-02-18 NOTE — PROGRESS NOTES
Medicated per MAR for 5/10 headache pain. Respirations even and unlabored. /63. Call light in reach.

## 2017-02-18 NOTE — PROGRESS NOTES
Resting in bed. Easily aroused to sound or touch. No c/o. /57. No further c/o headache. Call light in reach.     Tele Report:     Sinus rhythm/ Sinus Tach  HR   No ectopy  AK 0.14  QRS 0.08  QT 0.34

## 2017-02-18 NOTE — CARE PLAN
Problem: Safety  Goal: Will remain free from injury  Outcome: PROGRESSING AS EXPECTED  Intervention: Provide assistance with mobility  Encourage to call for any assistance needed, call light within reach.      Problem: Knowledge Deficit  Goal: Knowledge of disease process/condition, treatment plan, diagnostic tests, and medications will improve  Outcome: PROGRESSING AS EXPECTED  Intervention: Explain information regarding disease process/condition, treatment plan, diagnostic tests, and medications and document in education  Updated with plan of care, possible D/C today.

## 2017-02-18 NOTE — CARE PLAN
Problem: Safety  Goal: Will remain free from injury  Outcome: PROGRESSING AS EXPECTED  Oriented to room and equipment. Call light instruction given, in reach at all times. Slipper socks in place. Floor dry and uncluttered. Bed locked and in lowest position.     Problem: Knowledge Deficit  Goal: Knowledge of disease process/condition, treatment plan, diagnostic tests, and medications will improve  Outcome: PROGRESSING AS EXPECTED  Plan of care discussed with patient. Opportunity given for questions. States understanding.     Problem: Pain Management  Goal: Pain level will decrease to patient’s comfort goal  Outcome: PROGRESSING AS EXPECTED  Uses 0-10 scale appropriately. Pharmacologic and nonpharmacologic interventions in place.

## 2017-02-18 NOTE — DISCHARGE INSTRUCTIONS
Discharge Instructions    Discharged to home by car with relative. Discharged via wheelchair, hospital escort: Yes.  Special equipment needed: Not Applicable    Be sure to schedule a follow-up appointment with your primary care doctor or any specialists as instructed.     Discharge Plan:   Diet Plan: Discussed  Activity Level: Discussed  Confirmed Follow up Appointment: Patient to Call and Schedule Appointment  Confirmed Symptoms Management: Discussed  Medication Reconciliation Updated: Yes  Influenza Vaccine Indication: Not indicated: Previously immunized this influenza season and > 8 years of age    I understand that a diet low in cholesterol, fat, and sodium is recommended for good health. Unless I have been given specific instructions below for another diet, I accept this instruction as my diet prescription.   Other diet: regular    Special Instructions: Hypertension  Hypertension, commonly called high blood pressure, is when the force of blood pumping through your arteries is too strong. Your arteries are the blood vessels that carry blood from your heart throughout your body. A blood pressure reading consists of a higher number over a lower number, such as 110/72. The higher number (systolic) is the pressure inside your arteries when your heart pumps. The lower number (diastolic) is the pressure inside your arteries when your heart relaxes. Ideally you want your blood pressure below 120/80.  Hypertension forces your heart to work harder to pump blood. Your arteries may become narrow or stiff. Having untreated or uncontrolled hypertension can cause heart attack, stroke, kidney disease, and other problems.  RISK FACTORS  Some risk factors for high blood pressure are controllable. Others are not.   Risk factors you cannot control include:   · Race. You may be at higher risk if you are .  · Age. Risk increases with age.  · Gender. Men are at higher risk than women before age 45 years. After age 65,  women are at higher risk than men.  Risk factors you can control include:  · Not getting enough exercise or physical activity.  · Being overweight.  · Getting too much fat, sugar, calories, or salt in your diet.  · Drinking too much alcohol.  SIGNS AND SYMPTOMS  Hypertension does not usually cause signs or symptoms. Extremely high blood pressure (hypertensive crisis) may cause headache, anxiety, shortness of breath, and nosebleed.  DIAGNOSIS  To check if you have hypertension, your health care provider will measure your blood pressure while you are seated, with your arm held at the level of your heart. It should be measured at least twice using the same arm. Certain conditions can cause a difference in blood pressure between your right and left arms. A blood pressure reading that is higher than normal on one occasion does not mean that you need treatment. If it is not clear whether you have high blood pressure, you may be asked to return on a different day to have your blood pressure checked again. Or, you may be asked to monitor your blood pressure at home for 1 or more weeks.  TREATMENT  Treating high blood pressure includes making lifestyle changes and possibly taking medicine. Living a healthy lifestyle can help lower high blood pressure. You may need to change some of your habits.  Lifestyle changes may include:  · Following the DASH diet. This diet is high in fruits, vegetables, and whole grains. It is low in salt, red meat, and added sugars.  · Keep your sodium intake below 2,300 mg per day.  · Getting at least 30-45 minutes of aerobic exercise at least 4 times per week.  · Losing weight if necessary.  · Not smoking.  · Limiting alcoholic beverages.  · Learning ways to reduce stress.  Your health care provider may prescribe medicine if lifestyle changes are not enough to get your blood pressure under control, and if one of the following is true:  · You are 18-59 years of age and your systolic blood pressure  is above 140.  · You are 60 years of age or older, and your systolic blood pressure is above 150.  · Your diastolic blood pressure is above 90.  · You have diabetes, and your systolic blood pressure is over 140 or your diastolic blood pressure is over 90.  · You have kidney disease and your blood pressure is above 140/90.  · You have heart disease and your blood pressure is above 140/90.  Your personal target blood pressure may vary depending on your medical conditions, your age, and other factors.  HOME CARE INSTRUCTIONS  · Have your blood pressure rechecked as directed by your health care provider.    · Take medicines only as directed by your health care provider. Follow the directions carefully. Blood pressure medicines must be taken as prescribed. The medicine does not work as well when you skip doses. Skipping doses also puts you at risk for problems.  · Do not smoke.    · Monitor your blood pressure at home as directed by your health care provider.   SEEK MEDICAL CARE IF:   · You think you are having a reaction to medicines taken.  · You have recurrent headaches or feel dizzy.  · You have swelling in your ankles.  · You have trouble with your vision.  SEEK IMMEDIATE MEDICAL CARE IF:  · You develop a severe headache or confusion.  · You have unusual weakness, numbness, or feel faint.  · You have severe chest or abdominal pain.  · You vomit repeatedly.  · You have trouble breathing.  MAKE SURE YOU:   · Understand these instructions.  · Will watch your condition.  · Will get help right away if you are not doing well or get worse.     This information is not intended to replace advice given to you by your health care provider. Make sure you discuss any questions you have with your health care provider.     Document Released: 12/18/2006 Document Revised: 05/03/2016 Document Reviewed: 10/10/2014  [employer] Interactive Patient Education ©2016 [employer]      · Is patient discharged on Warfarin / Coumadin?   No      · Is patient Post Blood Transfusion?  No    Depression / Suicide Risk    As you are discharged from this [facility] facility, it is important to learn how to keep safe from harming yourself.    Recognize the warning signs:  · Abrupt changes in personality, positive or negative- including increase in energy   · Giving away possessions  · Change in eating patterns- significant weight changes-  positive or negative  · Change in sleeping patterns- unable to sleep or sleeping all the time   · Unwillingness or inability to communicate  · Depression  · Unusual sadness, discouragement and loneliness  · Talk of wanting to die  · Neglect of personal appearance   · Rebelliousness- reckless behavior  · Withdrawal from people/activities they love  · Confusion- inability to concentrate     If you or a loved one observes any of these behaviors or has concerns about self-harm, here's what you can do:  · Talk about it- your feelings and reasons for harming yourself  · Remove any means that you might use to hurt yourself (examples: pills, rope, extension cords, firearm)  · Get professional help from the community (Mental Health, Substance Abuse, psychological counseling)  · Do not be alone:Call your Safe Contact- someone whom you trust who will be there for you.  · Call your local CRISIS HOTLINE 152-0502 or 506-261-9536  · Call your local Children's Mobile Crisis Response Team [place] (288) 533-2437 or www.Emida  · Call the toll free National Suicide Prevention Hotlines   · National Suicide Prevention Lifeline 530-650-TIRV (1953)  · National Hope Line Network 800-SUICIDE (283-1974)

## 2017-02-18 NOTE — PROGRESS NOTES
Discharge to home, discharge instructions and prescriptions given, explained and understood, accompanied by .

## 2017-02-23 ENCOUNTER — OFFICE VISIT (OUTPATIENT)
Dept: MEDICAL GROUP | Facility: MEDICAL CENTER | Age: 77
End: 2017-02-23
Payer: MEDICARE

## 2017-02-23 VITALS
DIASTOLIC BLOOD PRESSURE: 76 MMHG | OXYGEN SATURATION: 96 % | BODY MASS INDEX: 22.89 KG/M2 | HEIGHT: 65 IN | WEIGHT: 137.4 LBS | SYSTOLIC BLOOD PRESSURE: 116 MMHG | TEMPERATURE: 98.1 F | HEART RATE: 85 BPM | RESPIRATION RATE: 16 BRPM

## 2017-02-23 DIAGNOSIS — Z01.810 PREOP CARDIOVASCULAR EXAM: ICD-10-CM

## 2017-02-23 DIAGNOSIS — Z79.891 CHRONIC PRESCRIPTION OPIATE USE: ICD-10-CM

## 2017-02-23 DIAGNOSIS — M19.90 ARTHRITIS: ICD-10-CM

## 2017-02-23 DIAGNOSIS — M25.511 ACUTE PAIN OF RIGHT SHOULDER: ICD-10-CM

## 2017-02-23 DIAGNOSIS — I10 ESSENTIAL HYPERTENSION: ICD-10-CM

## 2017-02-23 DIAGNOSIS — Z12.31 VISIT FOR SCREENING MAMMOGRAM: ICD-10-CM

## 2017-02-23 PROCEDURE — G8482 FLU IMMUNIZE ORDER/ADMIN: HCPCS | Performed by: INTERNAL MEDICINE

## 2017-02-23 PROCEDURE — G8419 CALC BMI OUT NRM PARAM NOF/U: HCPCS | Performed by: INTERNAL MEDICINE

## 2017-02-23 PROCEDURE — 1101F PT FALLS ASSESS-DOCD LE1/YR: CPT | Performed by: INTERNAL MEDICINE

## 2017-02-23 PROCEDURE — 4040F PNEUMOC VAC/ADMIN/RCVD: CPT | Performed by: INTERNAL MEDICINE

## 2017-02-23 PROCEDURE — 99214 OFFICE O/P EST MOD 30 MIN: CPT | Performed by: INTERNAL MEDICINE

## 2017-02-23 PROCEDURE — G8432 DEP SCR NOT DOC, RNG: HCPCS | Performed by: INTERNAL MEDICINE

## 2017-02-23 PROCEDURE — 1036F TOBACCO NON-USER: CPT | Performed by: INTERNAL MEDICINE

## 2017-02-23 RX ORDER — HYDROCODONE BITARTRATE AND ACETAMINOPHEN 10; 325 MG/1; MG/1
1 TABLET ORAL EVERY 8 HOURS PRN
Qty: 90 TAB | Refills: 0 | Status: SHIPPED | OUTPATIENT
Start: 2017-02-23 | End: 2017-02-23 | Stop reason: SDUPTHER

## 2017-02-23 RX ORDER — HYDROCODONE BITARTRATE AND ACETAMINOPHEN 10; 325 MG/1; MG/1
1 TABLET ORAL EVERY 8 HOURS PRN
Qty: 90 TAB | Refills: 0 | Status: SHIPPED | OUTPATIENT
Start: 2017-02-23 | End: 2017-05-16 | Stop reason: SDUPTHER

## 2017-02-23 RX ORDER — HYDROCHLOROTHIAZIDE 12.5 MG/1
12.5 TABLET ORAL DAILY
Qty: 30 TAB | Refills: 6 | Status: SHIPPED | OUTPATIENT
Start: 2017-02-23 | End: 2018-05-21 | Stop reason: SDUPTHER

## 2017-02-23 RX ORDER — LISINOPRIL 40 MG/1
40 TABLET ORAL DAILY
Qty: 30 TAB | Refills: 6 | Status: SHIPPED | OUTPATIENT
Start: 2017-02-23 | End: 2017-11-16 | Stop reason: SDUPTHER

## 2017-02-23 ASSESSMENT — ENCOUNTER SYMPTOMS: DEPRESSION: 0

## 2017-02-23 ASSESSMENT — LIFESTYLE VARIABLES: HISTORY_ALCOHOL_USE: 0

## 2017-02-23 NOTE — MR AVS SNAPSHOT
"        Nafisa Hancock   2017 1:40 PM   Office Visit   MRN: [id number]    Department:  [address] [place] [address]   Dept Phone:  [phone]    Description:  Female : 1940   Provider:  James Reyes M.D.           Allergies as of 2017     Allergen Noted Reactions    Nkda [No Known Drug Allergy] 2009         You were diagnosed with     Chronic prescription opiate use   [5308573]       Arthritis   [005266]       Preop cardiovascular exam   [210079]       Acute pain of right shoulder   [7441534]       Essential hypertension   [1660614]       Visit for screening mammogram   [047988]         Vital Signs     Blood Pressure Pulse Temperature Respirations Height Weight    116/76 mmHg 85 36.7 °C (98.1 °F) 16 1.651 m (5' 5\") 62.324 kg (137 lb 6.4 oz)    Body Mass Index Oxygen Saturation Last Menstrual Period Smoking Status          22.86 kg/m2 96% 2017 (Exact Date) Never Smoker         Basic Information     Date Of Birth Sex Race Ethnicity Preferred Language    1940 Female White Non- English      Your appointments     May 16, 2017  3:00 PM   Established Patient with James Reyes M.D.   [facility] [address]    [address] [facility] [address]  [facility] [id number] [phone]           You will be receiving a confirmation call a few days before your appointment from our automated call confirmation system.              Problem List              ICD-10-CM Priority Class Noted - Resolved    Osteoporosis, senile (Chronic) M81.0   2010 - Present    Arthritis M19.90   2011 - Present    Vitamin d deficiency    2012 - Present    Chronic prescription opiate use Z79.891   2015 - Present    Right shoulder pain M25.511   2017 - Present    Essential hypertension I10   2017 - Present      Health Maintenance        Date Due Completion Dates    MAMMOGRAM 2017, 2015, 2013, 2011, 2010, 2010, 2010, 6/3/2009, " 6/3/2009    BONE DENSITY 10/30/2018 10/30/2013, 3/31/2008    IMM DTaP/Tdap/Td Vaccine (2 - Td) 5/10/2022 5/10/2012    COLONOSCOPY 6/15/2022 6/15/2012 (Done), 5/10/2012 (Declined)    Override on 6/15/2012: Done    Override on 5/10/2012: Patient Declined            Current Immunizations     13-VALENT PCV PREVNAR 11/18/2015    Influenza TIV (IM) 9/16/2014, 9/14/2013, 10/22/2010    Influenza Vaccine Adult HD 11/29/2016, 9/26/2015    Pneumococcal Vaccine (UF)Historical Data 5/24/2006    Pneumococcal polysaccharide vaccine (PPSV-23) 11/29/2016, 5/24/2006    SHINGLES VACCINE 2/23/2016    Tdap Vaccine 5/10/2012      Below and/or attached are the medications your provider expects you to take. Review all of your home medications and newly ordered medications with your provider and/or pharmacist. Follow medication instructions as directed by your provider and/or pharmacist. Please keep your medication list with you and share with your provider. Update the information when medications are discontinued, doses are changed, or new medications (including over-the-counter products) are added; and carry medication information at all times in the event of emergency situations     Allergies:  NKDA - (reactions not documented)               Medications  Valid as of: February 23, 2017 -  1:50 PM    Generic Name Brand Name Tablet Size Instructions for use    Calcium Citrate-Vitamin D   Take 1,200 mg by mouth every day.        Cyanocobalamin   Take 500 mcg by mouth every day.        Estrogens Conjugated (Tab) PREMARIN 0.45 MG Take  by mouth every day.        HydroCHLOROthiazide (Tab) HYDRODIURIL 12.5 MG Take 1 Tab by mouth every day.        Hydrocodone-Acetaminophen (Tab) NORCO  MG Take 1 Tab by mouth every 8 hours as needed for up to 30 days.        Lisinopril (Tab) PRINIVIL, ZESTRIL 40 MG Take 1 Tab by mouth every day.        MedroxyPROGESTERone Acetate (Tab) PROVERA 5 MG Take 2.5 mg by mouth every day. Daily x10d per month         Multiple Vitamin   Take  by mouth every day.        .                 Medicines prescribed today were sent to:     Health system [facility]Y [address] [place] [address] (), [place] - [address]    [address] () [place] [address]    Phone: [phone] Fax: [phone]    Open 24 Hours?: No      Medication refill instructions:       If your prescription bottle indicates you have medication refills left, it is not necessary to call your provider’s office. Please contact your pharmacy and they will refill your medication.    If your prescription bottle indicates you do not have any refills left, you may request refills at any time through one of the following ways: The online NiftyThrifty system (except Urgent Care), by calling your provider’s office, or by asking your pharmacy to contact your provider’s office with a refill request. Medication refills are processed only during regular business hours and may not be available until the next business day. Your provider may request additional information or to have a follow-up visit with you prior to refilling your medication.   *Please Note: Medication refills are assigned a new Rx number when refilled electronically. Your pharmacy may indicate that no refills were authorized even though a new prescription for the same medication is available at the pharmacy. Please request the medicine by name with the pharmacy before contacting your provider for a refill.        Your To Do List     Future Labs/Procedures Complete By Expires    MA-SCREEN MAMMO W/CAD-BILAT  As directed 3/27/2018      Other Notes About Your Plan     Patient Enrolled in [facility] with Dr.John Reyes.   patient has been on Norco for her arthritis pain since 2009. ? Opioid dependence, continuous code 304.01                      NiftyThrifty Access Code: [email or web address]  Expires: 3/20/2017 11:05 AM    NiftyThrifty  A secure, online tool to manage your health information     [employer]’s NiftyThrifty® is a secure, online tool  that connects you to your personalized health information from the privacy of your home -- day or night - making it very easy for you to manage your healthcare. Once the activation process is completed, you can even access your medical information using the divorce360 abbie, which is available for free in the Apple Abbie store or Google Play store.     divorce360 provides the following levels of access (as shown below):   My Chart Features   [facility] Doctor [facility]  Specialists [facility]  Urgent  Care [facility]  Primary Care  Doctor   Email your healthcare team securely and privately 24/7 X X X    Manage appointments: schedule your next appointment; view details of past/upcoming appointments X      Request prescription refills. X      View recent personal medical records, including lab and immunizations X X X X   View health record, including health history, allergies, medications X X X X   Read reports about your outpatient visits, procedures, consult and ER notes X X X X   See your discharge summary, which is a recap of your hospital and/or ER visit that includes your diagnosis, lab results, and care plan. X X       How to register for divorce360:  1. Go to  [email or web address].  2. Click on the Sign Up Now box, which takes you to the New Member Sign Up page. You will need to provide the following information:  a. Enter your divorce360 Access Code exactly as it appears at the top of this page. (You will not need to use this code after you’ve completed the sign-up process. If you do not sign up before the expiration date, you must request a new code.)   b. Enter your date of birth.   c. Enter your home email address.   d. Click Submit, and follow the next screen’s instructions.  3. Create a divorce360 ID. This will be your divorce360 login ID and cannot be changed, so think of one that is secure and easy to remember.  4. Create a divorce360 password. You can change your password at any time.  5. Enter your Password Reset  Question and Answer. This can be used at a later time if you forget your password.   6. Enter your e-mail address. This allows you to receive e-mail notifications when new information is available in [employer].  7. Click Sign Up. You can now view your health information.    For assistance activating your [employer] account, call [phone]

## 2017-02-23 NOTE — PROGRESS NOTES
CC: Follow-up chronic pain and hypertension.    HPI:   Nafisa presents today with the following.    1. Chronic prescription opiate use  Analgesia:  not changed  Activity:  not changed  Adverse Events: None  Aberrant Behaviors: None  Affect/Mood: normal speech pattern and content  Last dose medication today    Opioid Risk Score: 0    Interpretation of Opioid Risk Score   Score 0-3 = Low risk of abuse. Do UDS at least once per year.  Score 4-7 = Moderate risk of abuse. Do UDS 1-4 times per year.  Score 8+ = High risk of abuse. Refer to specialist.      2. Arthritis  Reports pain is stable taking 1-3 Oscar per day. Denying any excessive confusion.    3. Preop cardiovascular exam  She was about to have surgery without have elevated blood pressure and is eating release    4. Acute pain of right shoulder  Again plan for shoulder surgery with orthopedics.    5. Essential hypertension  The day of procedure blood pressure was up to 200 systolic. She had no chest pain or shortness of breath no palpitations. Lisinopril was increased to 40 mg and added 12.5 mg of hydrochlorothiazide. She is tolerating well without dizziness brings a blood pressure log into the office with good readings. She does report being anxious the day of procedure.    6. Visit for screening mammogram        Patient Active Problem List    Diagnosis Date Noted   • Essential hypertension 02/23/2017   • Right shoulder pain 02/16/2017   • Chronic prescription opiate use 02/18/2015   • Vitamin d deficiency 09/17/2012   • Arthritis 05/24/2011   • Osteoporosis, senile 06/18/2010       Current Outpatient Prescriptions   Medication Sig Dispense Refill   • hydrocodone/acetaminophen (NORCO)  MG Tab Take 1 Tab by mouth every 8 hours as needed for up to 30 days. 90 Tab 0   • lisinopril (PRINIVIL, ZESTRIL) 40 MG tablet Take 1 Tab by mouth every day. 30 Tab 6   • hydrochlorothiazide (HYDRODIURIL) 12.5 MG tablet Take 1 Tab by mouth every day. 30 Tab 6   • Calcium  "Carbonate-Vitamin D (CALCIUM + D PO) Take 1,200 mg by mouth every day.     • medroxyPROGESTERone (PROVERA) 5 MG TABS Take 2.5 mg by mouth every day. Daily x10d per month     • Estrogens Conjugated (PREMARIN) 0.45 MG TABS Take  by mouth every day.     • Multiple Vitamin (MULTIVITAMINS PO) Take  by mouth every day.     • Cyanocobalamin (VITAMIN B-12 PO) Take 500 mcg by mouth every day.       No current facility-administered medications for this visit.         Allergies as of 02/23/2017 - Leonard as Reviewed 02/23/2017   Allergen Reaction Noted   • Nkda [no known drug allergy]  09/22/2009        ROS: As per HPI.    /76 mmHg  Pulse 85  Temp(Src) 36.7 °C (98.1 °F)  Resp 16  Ht 1.651 m (5' 5\")  Wt 62.324 kg (137 lb 6.4 oz)  BMI 22.86 kg/m2  SpO2 96%  LMP 01/13/2017 (Exact Date)    Physical Exam:  Gen:         Alert and oriented, No apparent distress.  Neck:        No Lymphadenopathy or Bruits.  Lungs:     Clear to auscultation bilaterally  CV:          Regular rate and rhythm. No murmurs, rubs or gallops.               Ext:          No clubbing, cyanosis, edema.      Assessment and Plan.   76 y.o. female with the following issues.    1. Chronic prescription opiate use  Already in contract with current urine drug screen.    2. Arthritis  Refilled medications for the next 3 months we'll followup at that time. Pain is currently stable without new symptomology.  Controlled substance report reviewed and medications were deemed to be medically needed.  - hydrocodone/acetaminophen (NORCO)  MG Tab; Take 1 Tab by mouth every 8 hours as needed for up to 30 days.  Dispense: 90 Tab; Refill: 0    3. Preop cardiovascular exam  Patient is cleared for surgery with average risk for anesthesia.    4. Acute pain of right shoulder  As above follow along with orthopedics postsurgically.    5. Essential hypertension  Currently well controlled, Discuss diet, exercise and salt restriction. Have refilled current medications to " continue reevaluate postoperatively.    6. Visit for screening mammogram    - MA-SCREEN MAMMO W/CAD-BILAT; Future

## 2017-03-06 ENCOUNTER — APPOINTMENT (OUTPATIENT)
Dept: ADMISSIONS | Facility: MEDICAL CENTER | Age: 77
End: 2017-03-06
Attending: ORTHOPAEDIC SURGERY
Payer: MEDICARE

## 2017-03-16 ENCOUNTER — HOSPITAL ENCOUNTER (OUTPATIENT)
Facility: MEDICAL CENTER | Age: 77
End: 2017-03-16
Attending: ORTHOPAEDIC SURGERY | Admitting: ORTHOPAEDIC SURGERY
Payer: MEDICARE

## 2017-03-16 VITALS
SYSTOLIC BLOOD PRESSURE: 144 MMHG | OXYGEN SATURATION: 98 % | TEMPERATURE: 97.9 F | DIASTOLIC BLOOD PRESSURE: 69 MMHG | HEIGHT: 65 IN | RESPIRATION RATE: 16 BRPM | WEIGHT: 139.55 LBS | BODY MASS INDEX: 23.25 KG/M2

## 2017-03-16 PROBLEM — M75.121 COMPLETE ROTATOR CUFF TEAR OR RUPTURE OF RIGHT SHOULDER, NOT SPECIFIED AS TRAUMATIC: Status: ACTIVE | Noted: 2017-03-16

## 2017-03-16 PROCEDURE — 160025 RECOVERY II MINUTES (STATS): Performed by: ORTHOPAEDIC SURGERY

## 2017-03-16 PROCEDURE — 500028 HCHG ARTHROWAND TURBOVAC 3.5/90 SUCT.: Performed by: ORTHOPAEDIC SURGERY

## 2017-03-16 PROCEDURE — 502581 HCHG PACK, SHOULDER ARTHROSCOPY: Performed by: ORTHOPAEDIC SURGERY

## 2017-03-16 PROCEDURE — 500151 HCHG CANNULA, THRDED 8.4: Performed by: ORTHOPAEDIC SURGERY

## 2017-03-16 PROCEDURE — 700111 HCHG RX REV CODE 636 W/ 250 OVERRIDE (IP)

## 2017-03-16 PROCEDURE — 700101 HCHG RX REV CODE 250

## 2017-03-16 PROCEDURE — 501655 HCHG TUBING, ARTHREX PUMP REDEUCE: Performed by: ORTHOPAEDIC SURGERY

## 2017-03-16 PROCEDURE — 110382 HCHG SHELL REV 271: Performed by: ORTHOPAEDIC SURGERY

## 2017-03-16 PROCEDURE — 160002 HCHG RECOVERY MINUTES (STAT): Performed by: ORTHOPAEDIC SURGERY

## 2017-03-16 PROCEDURE — 160009 HCHG ANES TIME/MIN: Performed by: ORTHOPAEDIC SURGERY

## 2017-03-16 PROCEDURE — 502000 HCHG MISC OR IMPLANTS RC 0278: Performed by: ORTHOPAEDIC SURGERY

## 2017-03-16 PROCEDURE — 500144 HCHG CANNULA KIT, UNIV GREY-SHOULDER: Performed by: ORTHOPAEDIC SURGERY

## 2017-03-16 PROCEDURE — A9270 NON-COVERED ITEM OR SERVICE: HCPCS

## 2017-03-16 PROCEDURE — A4606 OXYGEN PROBE USED W OXIMETER: HCPCS | Performed by: ORTHOPAEDIC SURGERY

## 2017-03-16 PROCEDURE — 160041 HCHG SURGERY MINUTES - EA ADDL 1 MIN LEVEL 4: Performed by: ORTHOPAEDIC SURGERY

## 2017-03-16 PROCEDURE — 700102 HCHG RX REV CODE 250 W/ 637 OVERRIDE(OP)

## 2017-03-16 PROCEDURE — 160029 HCHG SURGERY MINUTES - 1ST 30 MINS LEVEL 4: Performed by: ORTHOPAEDIC SURGERY

## 2017-03-16 PROCEDURE — 501838 HCHG SUTURE GENERAL: Performed by: ORTHOPAEDIC SURGERY

## 2017-03-16 PROCEDURE — 160035 HCHG PACU - 1ST 60 MINS PHASE I: Performed by: ORTHOPAEDIC SURGERY

## 2017-03-16 PROCEDURE — 501654 HCHG TUBING, ARTHREX PATIENT REDEUCE: Performed by: ORTHOPAEDIC SURGERY

## 2017-03-16 PROCEDURE — 501336 HCHG SHAVER: Performed by: ORTHOPAEDIC SURGERY

## 2017-03-16 PROCEDURE — 700111 HCHG RX REV CODE 636 W/ 250 OVERRIDE (IP): Performed by: ORTHOPAEDIC SURGERY

## 2017-03-16 PROCEDURE — 500862 HCHG NEEDLE, SCORPION: Performed by: ORTHOPAEDIC SURGERY

## 2017-03-16 PROCEDURE — 160048 HCHG OR STATISTICAL LEVEL 1-5: Performed by: ORTHOPAEDIC SURGERY

## 2017-03-16 PROCEDURE — 110371 HCHG SHELL REV 272: Performed by: ORTHOPAEDIC SURGERY

## 2017-03-16 PROCEDURE — 160046 HCHG PACU - 1ST 60 MINS PHASE II: Performed by: ORTHOPAEDIC SURGERY

## 2017-03-16 DEVICE — SUTURE ANCHOR MULTIFIX 5.5 S KNOTLESS: Type: IMPLANTABLE DEVICE | Status: FUNCTIONAL

## 2017-03-16 RX ORDER — MIDAZOLAM HYDROCHLORIDE 1 MG/ML
INJECTION INTRAMUSCULAR; INTRAVENOUS
Status: DISCONTINUED
Start: 2017-03-16 | End: 2017-03-16 | Stop reason: HOSPADM

## 2017-03-16 RX ORDER — BUPIVACAINE HYDROCHLORIDE AND EPINEPHRINE 5; 5 MG/ML; UG/ML
INJECTION, SOLUTION EPIDURAL; INTRACAUDAL; PERINEURAL
Status: DISCONTINUED | OUTPATIENT
Start: 2017-03-16 | End: 2017-03-16 | Stop reason: HOSPADM

## 2017-03-16 RX ORDER — EPINEPHRINE 1 MG/ML(1)
AMPUL (ML) INJECTION
Status: DISCONTINUED | OUTPATIENT
Start: 2017-03-16 | End: 2017-03-16 | Stop reason: HOSPADM

## 2017-03-16 RX ORDER — CELECOXIB 200 MG/1
CAPSULE ORAL
Status: DISCONTINUED
Start: 2017-03-16 | End: 2017-03-16 | Stop reason: HOSPADM

## 2017-03-16 RX ORDER — ACETAMINOPHEN 500 MG
TABLET ORAL
Status: DISCONTINUED
Start: 2017-03-16 | End: 2017-03-16 | Stop reason: HOSPADM

## 2017-03-16 RX ORDER — OXYCODONE HCL 5 MG/5 ML
SOLUTION, ORAL ORAL
Status: COMPLETED
Start: 2017-03-16 | End: 2017-03-16

## 2017-03-16 RX ORDER — HYDROCODONE BITARTRATE AND ACETAMINOPHEN 5; 325 MG/1; MG/1
1-2 TABLET ORAL EVERY 4 HOURS PRN
Qty: 55 TAB | Refills: 0 | Status: SHIPPED | OUTPATIENT
Start: 2017-03-16 | End: 2017-05-16

## 2017-03-16 RX ORDER — OXYCODONE HCL 10 MG/1
TABLET, FILM COATED, EXTENDED RELEASE ORAL
Status: DISCONTINUED
Start: 2017-03-16 | End: 2017-03-16 | Stop reason: HOSPADM

## 2017-03-16 RX ORDER — LIDOCAINE HYDROCHLORIDE 10 MG/ML
INJECTION, SOLUTION INFILTRATION; PERINEURAL
Status: COMPLETED
Start: 2017-03-16 | End: 2017-03-16

## 2017-03-16 RX ORDER — SODIUM CHLORIDE, SODIUM LACTATE, POTASSIUM CHLORIDE, CALCIUM CHLORIDE 600; 310; 30; 20 MG/100ML; MG/100ML; MG/100ML; MG/100ML
1000 INJECTION, SOLUTION INTRAVENOUS
Status: DISCONTINUED | OUTPATIENT
Start: 2017-03-16 | End: 2017-03-16 | Stop reason: HOSPADM

## 2017-03-16 RX ADMIN — LIDOCAINE HYDROCHLORIDE 0.2 ML: 10 INJECTION, SOLUTION INFILTRATION; PERINEURAL at 06:01

## 2017-03-16 RX ADMIN — OXYCODONE HYDROCHLORIDE 5 MG: 5 SOLUTION ORAL at 08:45

## 2017-03-16 ASSESSMENT — PAIN SCALES - GENERAL
PAINLEVEL_OUTOF10: 7
PAINLEVEL_OUTOF10: 0
PAINLEVEL_OUTOF10: 4

## 2017-03-16 NOTE — OR NURSING
Name, birthday, allergies, NPO status, last void, medication rec, surgical site and procedure verified with patient.  Pt belongings collected and secured in locker.  Pt verbalizes understanding of pain scale, expected plan of care and course of stay.  IV established. Sequentials placed.  Family at bedside.

## 2017-03-16 NOTE — IP AVS SNAPSHOT
" Home Care Instructions                                                                                                                Name:Nafisa Hancock  Medical Record Number:[id number]  CSN: [id number]    YOB: 1940   Age: 76 y.o.  Sex: female  HT:1.651 m (5' 5\") WT: 63.3 kg (139 lb 8.8 oz)          Admit Date: 3/16/2017     Discharge Date:   Today's Date: 3/16/2017  Attending Doctor:  James Meza M.D.                  Allergies:  Nkda                Discharge Instructions         ACTIVITY: Rest and take it easy for the first 24 hours.  A responsible adult is recommended to remain with you during that time.  It is normal to feel sleepy.  We encourage you to not do anything that requires balance, judgment or coordination.    MILD FLU-LIKE SYMPTOMS ARE NORMAL. YOU MAY EXPERIENCE GENERALIZED MUSCLE ACHES, THROAT IRRITATION, HEADACHE AND/OR SOME NAUSEA.    FOR 24 HOURS DO NOT:  Drive, operate machinery or run household appliances.  Drink beer or alcoholic beverages.   Make important decisions or sign legal documents.    SPECIAL INSTRUCTIONS: Up with shoulder immobilizer. Ice often     DIET: To avoid nausea, slowly advance diet as tolerated, avoiding spicy or greasy foods for the first day.  Add more substantial food to your diet according to your physician's instructions.  Babies can be fed formula or breast milk as soon as they are hungry.  INCREASE FLUIDS AND FIBER TO AVOID CONSTIPATION.    SURGICAL DRESSING/BATHING: Keep dressing clean and dry and intact per Dr Meza orders    FOLLOW-UP APPOINTMENT:  A follow-up appointment as scheduled    You should CALL YOUR PHYSICIAN if you develop:  Fever greater than 101 degrees F.  Pain not relieved by medication, or persistent nausea or vomiting.  Excessive bleeding (blood soaking through dressing) or unexpected drainage from the wound.  Extreme redness or swelling around the incision site, drainage of pus or foul smelling drainage.  Inability to urinate " or empty your bladder within 8 hours.  Problems with breathing or chest pain.    You should call 911 if you develop problems with breathing or chest pain.  If you are unable to contact your doctor or surgical center, you should go to the nearest emergency room or urgent care center.  Physician's telephone #: [phone]    If any questions arise, call your doctor.  If your doctor is not available, please feel free to call the Surgical Center at [phone].  The Center is open Monday through Friday from 7AM to 7PM.  You can also call the HEALTH HOTLINE open 24 hours/day, 7 days/week and speak to a nurse at [phone], or toll free at [phone].    A registered nurse may call you a few days after your surgery to see how you are doing after your procedure.    MEDICATIONS: Resume taking daily medication.  Take prescribed pain medication with food.  If no medication is prescribed, you may take non-aspirin pain medication if needed.  PAIN MEDICATION CAN BE VERY CONSTIPATING.  Take a stool softener or laxative such as senokot, pericolace, or milk of magnesia if needed.    Prescription given for Norco.  Last pain medication given at 0830    If your physician has prescribed pain medication that includes Acetaminophen (Tylenol), do not take additional Acetaminophen (Tylenol) while taking the prescribed medication.    Depression / Suicide Risk    As you are discharged from this [facility] facility, it is important to learn how to keep safe from harming yourself.    Recognize the warning signs:  · Abrupt changes in personality, positive or negative- including increase in energy   · Giving away possessions  · Change in eating patterns- significant weight changes-  positive or negative  · Change in sleeping patterns- unable to sleep or sleeping all the time   · Unwillingness or inability to communicate  · Depression  · Unusual sadness, discouragement and loneliness  · Talk of wanting to die  · Neglect of personal  appearance   · Rebelliousness- reckless behavior  · Withdrawal from people/activities they love  · Confusion- inability to concentrate     If you or a loved one observes any of these behaviors or has concerns about self-harm, here's what you can do:  · Talk about it- your feelings and reasons for harming yourself  · Remove any means that you might use to hurt yourself (examples: pills, rope, extension cords, firearm)  · Get professional help from the community (Mental Health, Substance Abuse, psychological counseling)  · Do not be alone:Call your Safe Contact- someone whom you trust who will be there for you.  · Call your local CRISIS HOTLINE 769-6948 or 587-088-7713  · Call your local Children's Mobile Crisis Response Team [place] (087) 767-9358 or www.ShareNotes.com  · Call the toll free National Suicide Prevention Hotlines   · National Suicide Prevention Lifeline 762-453-AZAK (3434)  · [employer] Line Network 800-SUICIDE (394-5326)       Medication List      CHANGE how you take these medications        Instructions    * hydrocodone/acetaminophen  MG Tabs   What changed:  Another medication with the same name was added. Make sure you understand how and when to take each.   Commonly known as:  NORCO    Doctor's comments:  Do not fill until 5/1/17   Take 1 Tab by mouth every 8 hours as needed for up to 30 days.   Dose:  1 Tab       * hydrocodone-acetaminophen 5-325 MG Tabs per tablet   What changed:  You were already taking a medication with the same name, and this prescription was added. Make sure you understand how and when to take each.   Commonly known as:  NORCO    Take 1-2 Tabs by mouth every four hours as needed.   Dose:  1-2 Tab       * Notice:  This list has 2 medication(s) that are the same as other medications prescribed for you. Read the directions carefully, and ask your doctor or other care provider to review them with you.      CONTINUE taking these medications        Instructions     CALCIUM + D PO    Take 1,200 mg by mouth every day.   Dose:  1200 mg       hydrochlorothiazide 12.5 MG tablet   Commonly known as:  HYDRODIURIL    Take 1 Tab by mouth every day.   Dose:  12.5 mg       lisinopril 40 MG tablet   Commonly known as:  PRINIVIL, ZESTRIL    Take 1 Tab by mouth every day.   Dose:  40 mg       medroxyPROGESTERone 5 MG Tabs   Commonly known as:  PROVERA    Take 2.5 mg by mouth every day. Daily x10d per month   Dose:  2.5 mg       MULTIVITAMINS PO    Take  by mouth every day.       PREMARIN 0.45 MG Tabs   Generic drug:  Estrogens Conjugated    Take  by mouth every day.       VITAMIN B-12 PO    Take 500 mcg by mouth every day.   Dose:  500 mcg               Medication Information     Above and/or attached are the medications your physician expects you to take upon discharge. Review all of your home medications and newly ordered medications with your doctor and/or pharmacist. Follow medication instructions as directed by your doctor and/or pharmacist. Please keep your medication list with you and share with your physician. Update the information when medications are discontinued, doses are changed, or new medications (including over-the-counter products) are added; and carry medication information at all times in the event of emergency situations.        Resources     Quit Smoking / Tobacco Use:    I understand the use of any tobacco products increases my chance of suffering from future heart disease or stroke and could cause other illnesses which may shorten my life. Quitting the use of tobacco products is the single most important thing I can do to improve my health. For further information on smoking / tobacco cessation call a Toll Free Quit Line at 1-943.792.4077 (*National [employer] [place]) or 1-276.198.3699 (American Lung Association) or you can access the web based program at www.lungusa.org.    [place] Tobacco Users Help Line:  (149) 657-8156       Toll Free: 1-760.153.6529  Quit Tobacco  Program [facility] (728)895-5434    Crisis Hotline:    [place] Crisis Hotline:  9-640-ZKHGWXG or 1-194.507.6558    [place] Crisis Hotline:    1-532.194.9644 or 455-848-4998    Discharge Survey:   Thank you for choosing [facility]. We hope we did everything we could to make your hospital stay a pleasant one. You may be receiving a survey and we would appreciate your time and participation in answering the questions. Your input is very valuable to us in our efforts to improve our service to our patients and their families.            Signatures     My signature on this form indicates that:    1. I acknowledge receipt and understanding of these Home Care Instruction.  2. My questions regarding this information have been answered to my satisfaction.  3. I have formulated a plan with my discharge nurse to obtain my prescribed medications for home.    __________________________________      __________________________________                   Patient Signature                                 Guardian/Responsible Adult Signature      __________________________________                 __________       ________                       Nurse Signature                                               Date                 Time

## 2017-03-16 NOTE — OP REPORT
DATE OF SERVICE: 3/16/17    PREOPERATIVE DIAGNOSIS: right shoulder rotator cuff tear, type 2 acromion, and labral tear    POSTOPERATIVE DIAGNOSIS: right shoulder rotator cuff tear, type 2 acromion, and labral tear    PROCEDURE PERFORMED: right shoulder arthroscopic rotator cuff repair, subacromioal decompression,  with labral debridement.      SURGEON: James Meza MD    ANESTHESIA: General     ANESTHESIOLOGIST: Charles    ANTIBIOTICS: Ancef    COMPLICATIONS: None.     IMPLANTS: [employer] 5.5 PEEK anchors x 3     INDICATIONS: The patient presents with right shoulder pain recalcitrant to conservative treatment. The patient has evidence of a right shoulder rotator cuff tear, type 2 acromion, labral tear. Options were discussed, including both non operative and operative treatments. Risks of surgery were discussed at length. All questions were answered. No guarantees were given.     PROCEDURE IN DETAIL: The patient was properly identified in the preoperative holding area, and the right shoulder was marked as the correct surgical site. The patient was then taken back to the operative room, and placed supine on the operating room table and underwent general anesthesia. The patient was placed in a beach chair position. The right upper extremity was sterilely prepped and draped in standard fashion.     A standard posterior portal was created. The scope was placed up the glenohumeral joint. An anterior portal was created through the rotator cuff interval just below the biceps. The was evidence of a SLAP tear, degenerative type. There was no evidence of biceps tendinosis. There was evidence of a full-thickness rotator cuff tear.  A debridement was performed of Type 1 slap tear.  The rest of the joint was inspected. There was mild chondromalacia both in the humeral head and the glenoid. No loose bodies were seen.    The scope was then placed in the subacromial space. A lateral portal was created. A bursectomy was  performed, exposing the anterior and lateral acromion. A 5/5 resector was used to perform a subacromial decompression removing several millimeters of bone off the anterior and lateral acromion. At this point, the rotator cuff was mobilized. A small boy trough was created for the bony footprint. 6 horizontal tapes were used followed by 3 5/5 Smith [employer]hew PEEK anchors. This achieved goo fixation, was probed and stable, checked in range of motion and stable. Excess fluid was drained. Incision were closed with 2-0 vicryl and 3-0 nylon. A total of 20ml 1% marcaine with epinephrine was injected. HENRRY Frank, was present throughout the operation as an essential part of the success of there operation assisting with patient position, instrumentation, retraction and closure.     The patient was extubated and transferred to the recovery room in stable condition.

## 2017-03-16 NOTE — DISCHARGE INSTRUCTIONS
ACTIVITY: Rest and take it easy for the first 24 hours.  A responsible adult is recommended to remain with you during that time.  It is normal to feel sleepy.  We encourage you to not do anything that requires balance, judgment or coordination.    MILD FLU-LIKE SYMPTOMS ARE NORMAL. YOU MAY EXPERIENCE GENERALIZED MUSCLE ACHES, THROAT IRRITATION, HEADACHE AND/OR SOME NAUSEA.    FOR 24 HOURS DO NOT:  Drive, operate machinery or run household appliances.  Drink beer or alcoholic beverages.   Make important decisions or sign legal documents.    SPECIAL INSTRUCTIONS: Up with shoulder immobilizer. Ice often     DIET: To avoid nausea, slowly advance diet as tolerated, avoiding spicy or greasy foods for the first day.  Add more substantial food to your diet according to your physician's instructions.  Babies can be fed formula or breast milk as soon as they are hungry.  INCREASE FLUIDS AND FIBER TO AVOID CONSTIPATION.    SURGICAL DRESSING/BATHING: Keep dressing clean and dry and intact per Dr Meza orders    FOLLOW-UP APPOINTMENT:  A follow-up appointment as scheduled    You should CALL YOUR PHYSICIAN if you develop:  Fever greater than 101 degrees F.  Pain not relieved by medication, or persistent nausea or vomiting.  Excessive bleeding (blood soaking through dressing) or unexpected drainage from the wound.  Extreme redness or swelling around the incision site, drainage of pus or foul smelling drainage.  Inability to urinate or empty your bladder within 8 hours.  Problems with breathing or chest pain.    You should call 911 if you develop problems with breathing or chest pain.  If you are unable to contact your doctor or surgical center, you should go to the nearest emergency room or urgent care center.  Physician's telephone #: [phone]    If any questions arise, call your doctor.  If your doctor is not available, please feel free to call the Surgical Center at [phone].  The Center is open Monday through Friday  from 7AM to 7PM.  You can also call the HEALTH HOTLINE open 24 hours/day, 7 days/week and speak to a nurse at [phone], or toll free at [phone].    A registered nurse may call you a few days after your surgery to see how you are doing after your procedure.    MEDICATIONS: Resume taking daily medication.  Take prescribed pain medication with food.  If no medication is prescribed, you may take non-aspirin pain medication if needed.  PAIN MEDICATION CAN BE VERY CONSTIPATING.  Take a stool softener or laxative such as senokot, pericolace, or milk of magnesia if needed.    Prescription given for Norco.  Last pain medication given at 0830    If your physician has prescribed pain medication that includes Acetaminophen (Tylenol), do not take additional Acetaminophen (Tylenol) while taking the prescribed medication.    Depression / Suicide Risk    As you are discharged from this [facility] facility, it is important to learn how to keep safe from harming yourself.    Recognize the warning signs:  · Abrupt changes in personality, positive or negative- including increase in energy   · Giving away possessions  · Change in eating patterns- significant weight changes-  positive or negative  · Change in sleeping patterns- unable to sleep or sleeping all the time   · Unwillingness or inability to communicate  · Depression  · Unusual sadness, discouragement and loneliness  · Talk of wanting to die  · Neglect of personal appearance   · Rebelliousness- reckless behavior  · Withdrawal from people/activities they love  · Confusion- inability to concentrate     If you or a loved one observes any of these behaviors or has concerns about self-harm, here's what you can do:  · Talk about it- your feelings and reasons for harming yourself  · Remove any means that you might use to hurt yourself (examples: pills, rope, extension cords, firearm)  · Get professional help from the community (Mental Health, Substance Abuse,  psychological counseling)  · Do not be alone:Call your Safe Contact- someone whom you trust who will be there for you.  · Call your local CRISIS HOTLINE 509-0507 or 760-956-3406  · Call your local Children's Mobile Crisis Response Team [place] (669) 645-4684 or www.[employer]  · Call the toll free National Suicide Prevention Hotlines   · National Suicide Prevention Lifeline 447-823-JJKS (1367)  · National Hope Line Network 800-SUICIDE (703-7990)

## 2017-03-16 NOTE — OR NURSING
0814 received from or  resp spont r shoulder dressing c/d/i  Fingers warm with good movement and cap refill<3 sec  Immobilizer intact  Ice pack applied 0915 dressing remains c/d/i  Pain tolerable  Meets discharge criteria

## 2017-03-16 NOTE — IP AVS SNAPSHOT
3/16/2017          Nafisa Hancock  [address] [place] [address]    Dear Nafisa:    [facility] wants to ensure your discharge home is safe and you or your loved ones have had all your questions answered regarding your care after you leave the hospital.    You may receive a telephone call within two days of your discharge.  This call is to make certain you understand your discharge instructions as well as ensure we provided you with the best care possible during your stay with us.     The call will only last approximately 3-5 minutes and will be done by a nurse.    Once again, we want to ensure your discharge home is safe and that you have a clear understanding of any next steps in your care.  If you have any questions or concerns, please do not hesitate to contact us, we are here for you.  Thank you for choosing [facility] for your healthcare needs.    Sincerely,    Bryan Barbosa    [facility]

## 2017-05-09 ENCOUNTER — TELEPHONE (OUTPATIENT)
Dept: MEDICAL GROUP | Facility: MEDICAL CENTER | Age: 77
End: 2017-05-09

## 2017-05-16 ENCOUNTER — OFFICE VISIT (OUTPATIENT)
Dept: MEDICAL GROUP | Facility: MEDICAL CENTER | Age: 77
End: 2017-05-16
Payer: MEDICARE

## 2017-05-16 VITALS
BODY MASS INDEX: 22.99 KG/M2 | OXYGEN SATURATION: 96 % | DIASTOLIC BLOOD PRESSURE: 62 MMHG | HEIGHT: 65 IN | HEART RATE: 82 BPM | SYSTOLIC BLOOD PRESSURE: 120 MMHG | WEIGHT: 138 LBS

## 2017-05-16 DIAGNOSIS — M19.90 ARTHRITIS: ICD-10-CM

## 2017-05-16 DIAGNOSIS — Z79.891 CHRONIC PRESCRIPTION OPIATE USE: ICD-10-CM

## 2017-05-16 DIAGNOSIS — Z12.31 VISIT FOR SCREENING MAMMOGRAM: ICD-10-CM

## 2017-05-16 PROBLEM — M75.121 COMPLETE ROTATOR CUFF TEAR OR RUPTURE OF RIGHT SHOULDER, NOT SPECIFIED AS TRAUMATIC: Status: RESOLVED | Noted: 2017-03-16 | Resolved: 2017-05-16

## 2017-05-16 PROCEDURE — G8420 CALC BMI NORM PARAMETERS: HCPCS | Performed by: INTERNAL MEDICINE

## 2017-05-16 PROCEDURE — 99214 OFFICE O/P EST MOD 30 MIN: CPT | Mod: 25 | Performed by: INTERNAL MEDICINE

## 2017-05-16 PROCEDURE — 4040F PNEUMOC VAC/ADMIN/RCVD: CPT | Performed by: INTERNAL MEDICINE

## 2017-05-16 PROCEDURE — 1036F TOBACCO NON-USER: CPT | Performed by: INTERNAL MEDICINE

## 2017-05-16 PROCEDURE — 1101F PT FALLS ASSESS-DOCD LE1/YR: CPT | Performed by: INTERNAL MEDICINE

## 2017-05-16 RX ORDER — HYDROCODONE BITARTRATE AND ACETAMINOPHEN 10; 325 MG/1; MG/1
1 TABLET ORAL EVERY 8 HOURS PRN
Qty: 90 TAB | Refills: 0 | Status: SHIPPED | OUTPATIENT
Start: 2017-05-16 | End: 2017-08-16 | Stop reason: SDUPTHER

## 2017-05-16 RX ORDER — HYDROCODONE BITARTRATE AND ACETAMINOPHEN 10; 325 MG/1; MG/1
1 TABLET ORAL EVERY 8 HOURS PRN
Qty: 90 TAB | Refills: 0 | Status: SHIPPED | OUTPATIENT
Start: 2017-05-16 | End: 2017-05-16 | Stop reason: SDUPTHER

## 2017-05-16 NOTE — PROGRESS NOTES
CC: Follow-up chronic pain    HPI:   Nafisa presents today with the following.    1. Visit for screening mammogram      2. Chronic prescription opiate use  Analgesia:  not changed  Activity:  not changed  Adverse Events:  none  Aberrant Behaviors:  none  Affect/Mood: normal speech pattern and content  Last dose medication today      3. Arthritis  Presents for refills of pain medication. Interim history she did injure her right shoulder after a ground-level fall once for surgery. She did receive postop pain medications but is back down to her typical daily dose. Her shoulders improved significantly but still painful in her arthralgias are no different. She denies any confusion or constipation medications. She denies any chest pain or shortness breath no edema.  Patient Active Problem List    Diagnosis Date Noted   • Chronic prescription opiate use 05/16/2017   • Essential hypertension 02/23/2017   • Right shoulder pain 02/16/2017   • Vitamin d deficiency 09/17/2012   • Arthritis 05/24/2011   • Osteoporosis, senile 06/18/2010       Current Outpatient Prescriptions   Medication Sig Dispense Refill   • hydrocodone/acetaminophen (NORCO)  MG Tab Take 1 Tab by mouth every 8 hours as needed for up to 30 days. 90 Tab 0   • lisinopril (PRINIVIL, ZESTRIL) 40 MG tablet Take 1 Tab by mouth every day. 30 Tab 6   • hydrochlorothiazide (HYDRODIURIL) 12.5 MG tablet Take 1 Tab by mouth every day. 30 Tab 6   • Calcium Carbonate-Vitamin D (CALCIUM + D PO) Take 1,200 mg by mouth every day.     • medroxyPROGESTERone (PROVERA) 5 MG TABS Take 2.5 mg by mouth every day. Daily x10d per month     • Estrogens Conjugated (PREMARIN) 0.45 MG TABS Take  by mouth every day.     • Multiple Vitamin (MULTIVITAMINS PO) Take  by mouth every day.     • Cyanocobalamin (VITAMIN B-12 PO) Take 500 mcg by mouth every day.       No current facility-administered medications for this visit.         Allergies as of 05/16/2017 - Leonard as Reviewed 03/16/2017  "  Allergen Reaction Noted   • Nkda [no known drug allergy]  09/22/2009        ROS: As per HPI.    /62 mmHg  Pulse 82  Ht 1.651 m (5' 5\")  Wt 62.596 kg (138 lb)  BMI 22.96 kg/m2  SpO2 96%    Physical Exam:  Gen:         Alert and oriented, No apparent distress.  Neck:        No Lymphadenopathy or Bruits.  Lungs:     Clear to auscultation bilaterally  CV:          Regular rate and rhythm. No murmurs, rubs or gallops.               Ext:          No clubbing, cyanosis, edema.      Assessment and Plan.   76 y.o. female with the following issues.    1. Visit for screening mammogram    - MA-SCREEN MAMMO W/CAD-BILAT; Future    2. Chronic prescription opiate use  Already on contract with unacceptable urine drug screen    3. Arthritis  Refilled medications for the next 3 months we'll followup at that time. Pain is currently stable without new symptomology.  Controlled substance report reviewed and medications were deemed to be medically needed.  - hydrocodone/acetaminophen (NORCO)  MG Tab; Take 1 Tab by mouth every 8 hours as needed for up to 30 days.  Dispense: 90 Tab; Refill: 0        "

## 2017-05-16 NOTE — MR AVS SNAPSHOT
"        Nafisa Hancock   2017 3:00 PM   Office Visit   MRN: [id number]    Department:  [address] [place] [address]   Dept Phone:  [phone]    Description:  Female : 1940   Provider:  James Reyes M.D.           Allergies as of 2017     Allergen Noted Reactions    Nkda [No Known Drug Allergy] 2009         You were diagnosed with     Visit for screening mammogram   [[id number]]       Chronic prescription opiate use   [5269876]       Arthritis   [[id number]]         Vital Signs     Blood Pressure Pulse Height Weight Body Mass Index Oxygen Saturation    120/62 mmHg 82 1.651 m (5' 5\") 62.596 kg (138 lb) 22.96 kg/m2 96%    Smoking Status                   Never Smoker            Basic Information     Date Of Birth Sex Race Ethnicity Preferred Language    1940 Female White Non- English      Your appointments     May 16, 2017  3:00 PM   Established Patient with James Reyes M.D.   [facility] [address]    [address] [employer] [facility] [address]  [place] [address] [phone]           You will be receiving a confirmation call a few days before your appointment from our automated call confirmation system.            Aug 16, 2017  9:20 AM   Established Patient with James Reyes M.D.   [facility] [address]    [address] [employer]  [facility] [address]  [place] [address] [phone]           You will be receiving a confirmation call a few days before your appointment from our automated call confirmation system.              Problem List              ICD-10-CM Priority Class Noted - Resolved    Osteoporosis, senile (Chronic) M81.0   2010 - Present    Arthritis M19.90   2011 - Present    Vitamin d deficiency    2012 - Present    Right shoulder pain M25.511   2017 - Present    Essential hypertension I10   2017 - Present    Chronic prescription opiate use Z79.891   2017 - Present      Health Maintenance        Date Due Completion Dates   " MAMMOGRAM 2/16/2017 2/16/2016, 1/5/2015, 1/4/2013, 12/28/2011, 7/12/2010, 6/29/2010, 6/29/2010, 6/3/2009, 6/3/2009    BONE DENSITY 10/30/2018 10/30/2013, 3/31/2008    IMM DTaP/Tdap/Td Vaccine (2 - Td) 5/10/2022 5/10/2012    COLONOSCOPY 6/15/2022 6/15/2012 (Done), 5/10/2012 (Declined)    Override on 6/15/2012: Done    Override on 5/10/2012: Patient Declined            Current Immunizations     13-VALENT PCV PREVNAR 11/18/2015    Influenza TIV (IM) 9/16/2014, 9/14/2013, 10/22/2010    Influenza Vaccine Adult HD 11/29/2016, 9/26/2015    Pneumococcal Vaccine (UF)Historical Data 5/24/2006    Pneumococcal polysaccharide vaccine (PPSV-23) 11/29/2016, 5/24/2006    SHINGLES VACCINE 2/23/2016    Tdap Vaccine 5/10/2012      Below and/or attached are the medications your provider expects you to take. Review all of your home medications and newly ordered medications with your provider and/or pharmacist. Follow medication instructions as directed by your provider and/or pharmacist. Please keep your medication list with you and share with your provider. Update the information when medications are discontinued, doses are changed, or new medications (including over-the-counter products) are added; and carry medication information at all times in the event of emergency situations     Allergies:  NKDA - (reactions not documented)               Medications  Valid as of: May 16, 2017 -  2:36 PM    Generic Name Brand Name Tablet Size Instructions for use    Calcium Citrate-Vitamin D   Take 1,200 mg by mouth every day.        Cyanocobalamin   Take 500 mcg by mouth every day.        Estrogens Conjugated (Tab) PREMARIN 0.45 MG Take  by mouth every day.        HydroCHLOROthiazide (Tab) HYDRODIURIL 12.5 MG Take 1 Tab by mouth every day.        Hydrocodone-Acetaminophen (Tab) NORCO  MG Take 1 Tab by mouth every 8 hours as needed for up to 30 days.        Lisinopril (Tab) PRINIVIL, ZESTRIL 40 MG Take 1 Tab by mouth every day.         MedroxyPROGESTERone Acetate (Tab) PROVERA 5 MG Take 2.5 mg by mouth every day. Daily x10d per month        Multiple Vitamin   Take  by mouth every day.        .                 Medicines prescribed today were sent to:     [facility] [address] [place] [address] (), [place] - [address]    [address] () [place] [address]    Phone: [phone] Fax: [phone]    Open 24 Hours?: No      Medication refill instructions:       If your prescription bottle indicates you have medication refills left, it is not necessary to call your provider’s office. Please contact your pharmacy and they will refill your medication.    If your prescription bottle indicates you do not have any refills left, you may request refills at any time through one of the following ways: The online [employer] system (except Urgent Care), by calling your provider’s office, or by asking your pharmacy to contact your provider’s office with a refill request. Medication refills are processed only during regular business hours and may not be available until the next business day. Your provider may request additional information or to have a follow-up visit with you prior to refilling your medication.   *Please Note: Medication refills are assigned a new Rx number when refilled electronically. Your pharmacy may indicate that no refills were authorized even though a new prescription for the same medication is available at the pharmacy. Please request the medicine by name with the pharmacy before contacting your provider for a refill.        Your To Do List     Future Labs/Procedures Complete By Expires    MA-SCREEN MAMMO W/CAD-BILAT  As directed 6/17/2018      Other Notes About Your Plan     [facility]   patient has been on Norco for her arthritis pain since 2009. ? Opioid dependence, continuous code 304.01                      [employer] Status: Patient Declined

## 2017-06-26 ENCOUNTER — HOSPITAL ENCOUNTER (OUTPATIENT)
Dept: RADIOLOGY | Facility: MEDICAL CENTER | Age: 77
End: 2017-06-26
Attending: INTERNAL MEDICINE
Payer: MEDICARE

## 2017-06-26 DIAGNOSIS — R92.8 ABNORMAL MAMMOGRAM: ICD-10-CM

## 2017-06-26 DIAGNOSIS — Z12.31 VISIT FOR SCREENING MAMMOGRAM: ICD-10-CM

## 2017-06-26 PROCEDURE — 77063 BREAST TOMOSYNTHESIS BI: CPT

## 2017-07-17 ENCOUNTER — TELEPHONE (OUTPATIENT)
Dept: RADIOLOGY | Facility: MEDICAL CENTER | Age: 77
End: 2017-07-17

## 2017-07-18 ENCOUNTER — HOSPITAL ENCOUNTER (OUTPATIENT)
Dept: RADIOLOGY | Facility: MEDICAL CENTER | Age: 77
End: 2017-07-18
Attending: INTERNAL MEDICINE
Payer: MEDICARE

## 2017-07-18 DIAGNOSIS — R92.8 ABNORMAL MAMMOGRAM: ICD-10-CM

## 2017-07-18 PROCEDURE — 76642 ULTRASOUND BREAST LIMITED: CPT | Mod: RT

## 2017-07-18 PROCEDURE — G0279 TOMOSYNTHESIS, MAMMO: HCPCS

## 2017-08-14 ENCOUNTER — TELEPHONE (OUTPATIENT)
Dept: MEDICAL GROUP | Facility: MEDICAL CENTER | Age: 77
End: 2017-08-14

## 2017-08-14 NOTE — TELEPHONE ENCOUNTER
Future Appointments       Provider Department [place]    8/16/2017 9:20 AM James Reyes M.D. [facility] [address]        ESTABLISHED PATIENT PRE-VISIT PLANNING     Note: Patient will not be contacted if there is no indication to call.     1.  Reviewed note from last office visit with PCP and/or other med group provider: Yes    2.  If any orders were placed at last visit, do we have Results/Consult Notes?        •  Labs - Labs were not ordered at last office visit.       •  Imaging - Imaging ordered, completed and results are in chart.       •  Referrals - Referral ordered, patient has NOT been seen.    3.  Immunizations were updated in Android App Review Source using WebIZ?: Yes       •  [employer] Recommendations: Patient is up to date on all vaccines    4.  Patient is due for the following Health Maintenance Topics:   There are no preventive care reminders to display for this patient.    - Patient is up-to-date on all Health Maintenance topics. No records have been requested at this time.    5.  Patient was not informed to arrive 15 min prior to their scheduled appointment and bring in their medication bottles.

## 2017-08-16 ENCOUNTER — OFFICE VISIT (OUTPATIENT)
Dept: MEDICAL GROUP | Facility: MEDICAL CENTER | Age: 77
End: 2017-08-16
Payer: MEDICARE

## 2017-08-16 VITALS
DIASTOLIC BLOOD PRESSURE: 82 MMHG | OXYGEN SATURATION: 99 % | RESPIRATION RATE: 16 BRPM | SYSTOLIC BLOOD PRESSURE: 132 MMHG | HEART RATE: 71 BPM | WEIGHT: 138.4 LBS | BODY MASS INDEX: 23.06 KG/M2 | HEIGHT: 65 IN | TEMPERATURE: 97.3 F

## 2017-08-16 DIAGNOSIS — M19.90 ARTHRITIS: ICD-10-CM

## 2017-08-16 DIAGNOSIS — Z79.891 CHRONIC PRESCRIPTION OPIATE USE: ICD-10-CM

## 2017-08-16 PROCEDURE — 99214 OFFICE O/P EST MOD 30 MIN: CPT | Performed by: INTERNAL MEDICINE

## 2017-08-16 RX ORDER — HYDROCODONE BITARTRATE AND ACETAMINOPHEN 10; 325 MG/1; MG/1
1 TABLET ORAL EVERY 8 HOURS PRN
Qty: 90 TAB | Refills: 0 | Status: SHIPPED | OUTPATIENT
Start: 2017-08-16 | End: 2017-11-16 | Stop reason: SDUPTHER

## 2017-08-16 RX ORDER — HYDROCODONE BITARTRATE AND ACETAMINOPHEN 10; 325 MG/1; MG/1
1 TABLET ORAL EVERY 8 HOURS PRN
Qty: 90 TAB | Refills: 0 | Status: SHIPPED | OUTPATIENT
Start: 2017-08-16 | End: 2017-08-16 | Stop reason: SDUPTHER

## 2017-08-16 NOTE — PROGRESS NOTES
CC: Follow-up chronic pain    HPI:   Nafisa presents today with the following.    1. Chronic prescription opiate use  Analgesia:  not changed  Activity:  not changed  Adverse Events:  none  Aberrant Behaviors:  none  Affect/Mood: normal speech pattern and content  Last dose medication today      2. 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 She denies any confusion or constipation medication and denies any new joints affected. Her shoulders much improved postoperatively now it's mostly her hands.      Patient Active Problem List    Diagnosis Date Noted   • Chronic prescription opiate use 05/16/2017   • Essential hypertension 02/23/2017   • Right shoulder pain 02/16/2017   • Vitamin d deficiency 09/17/2012   • Arthritis 05/24/2011   • Osteoporosis, senile 06/18/2010       Current Outpatient Prescriptions   Medication Sig Dispense Refill   • hydrocodone/acetaminophen (NORCO)  MG Tab Take 1 Tab by mouth every 8 hours as needed for up to 30 days. 90 Tab 0   • lisinopril (PRINIVIL, ZESTRIL) 40 MG tablet Take 1 Tab by mouth every day. 30 Tab 6   • hydrochlorothiazide (HYDRODIURIL) 12.5 MG tablet Take 1 Tab by mouth every day. 30 Tab 6   • Calcium Carbonate-Vitamin D (CALCIUM + D PO) Take 1,200 mg by mouth every day.     • medroxyPROGESTERone (PROVERA) 5 MG TABS Take 2.5 mg by mouth every day. Daily x10d per month     • Estrogens Conjugated (PREMARIN) 0.45 MG TABS Take  by mouth every day.     • Multiple Vitamin (MULTIVITAMINS PO) Take  by mouth every day.     • Cyanocobalamin (VITAMIN B-12 PO) Take 500 mcg by mouth every day.       No current facility-administered medications for this visit.         Allergies as of 08/16/2017 - Leonard as Reviewed 08/16/2017   Allergen Reaction Noted   • Nkda [no known drug allergy]  09/22/2009        ROS: As per HPI.    /82 mmHg  Pulse 71  Temp(Src) 36.3 °C (97.3 °F)  Resp 16  Ht 1.651 m  "(5' 5\")  Wt 62.778 kg (138 lb 6.4 oz)  BMI 23.03 kg/m2  SpO2 99%    Physical Exam:  Gen:         Alert and oriented, No apparent distress.  Neck:        No Lymphadenopathy or Bruits.  Lungs:     Clear to auscultation bilaterally  CV:          Regular rate and rhythm. No murmurs, rubs or gallops.               Ext:          No clubbing, cyanosis, edema.      Assessment and Plan.   76 y.o. female with the following issues.    1. Chronic prescription opiate use  Already on contract with acceptable urine drug screen    2. Arthritis  Refilled medications for the next 3 months we'll followup at that time. Pain is currently stable without new symptomology.  Controlled substance report reviewed and medications were deemed to be medically needed.  - hydrocodone/acetaminophen (NORCO)  MG Tab; Take 1 Tab by mouth every 8 hours as needed for up to 30 days.  Dispense: 90 Tab; Refill: 0        "

## 2017-08-16 NOTE — MR AVS SNAPSHOT
"        Nafisa Hancock   2017 9:20 AM   Office Visit   MRN: [id number]    Department:  [address] [place] [address]   Dept Phone:  [phone]    Description:  Female : 1940   Provider:  James Reyes M.D.           Reason for Visit     Follow-Up           Allergies as of 2017     Allergen Noted Reactions    Nkda [No Known Drug Allergy] 2009         You were diagnosed with     Chronic prescription opiate use   [5730744]       Arthritis   [481196]         Vital Signs     Blood Pressure Pulse Temperature Respirations Height Weight    132/82 mmHg 71 36.3 °C (97.3 °F) 16 1.651 m (5' 5\") 62.778 kg (138 lb 6.4 oz)    Body Mass Index Oxygen Saturation Smoking Status             23.03 kg/m2 99% Never Smoker          Basic Information     Date Of Birth Sex Race Ethnicity Preferred Language    1940 Female White Non- English      Your appointments     2017  1:20 PM   Established Patient with Jmaes Reyes M.D.   [facility] [address]    75 [facility] [address]  [facility] [address]   [phone]           You will be receiving a confirmation call a few days before your appointment from our automated call confirmation system.              Problem List              ICD-10-CM Priority Class Noted - Resolved    Osteoporosis, senile (Chronic) M81.0   2010 - Present    Arthritis M19.90   2011 - Present    Vitamin d deficiency    2012 - Present    Right shoulder pain M25.511   2017 - Present    Essential hypertension I10   2017 - Present    Chronic prescription opiate use Z79.891   2017 - Present      Health Maintenance        Date Due Completion Dates    IMM INFLUENZA (1) 2016, 2015, 2014, 2013, 10/22/2010    MAMMOGRAM 2018, 2017, 2016, 2015, 2013, 2011, 2010, 2010, 2010, 6/3/2009, 6/3/2009    BONE DENSITY 10/30/2018 10/30/2013, 3/31/2008    IMM " DTaP/Tdap/Td Vaccine (2 - Td) 5/10/2022 5/10/2012    COLONOSCOPY 6/15/2022 6/15/2012 (Done), 5/10/2012 (Declined)    Override on 6/15/2012: Done    Override on 5/10/2012: Patient Declined            Current Immunizations     13-VALENT PCV PREVNAR 11/18/2015    Influenza TIV (IM) 9/16/2014, 9/14/2013, 10/22/2010    Influenza Vaccine Adult HD 11/29/2016, 9/26/2015    Pneumococcal Vaccine (UF)Historical Data 5/24/2006    Pneumococcal polysaccharide vaccine (PPSV-23) 11/29/2016, 5/24/2006    SHINGLES VACCINE 2/23/2016    Tdap Vaccine 5/10/2012      Below and/or attached are the medications your provider expects you to take. Review all of your home medications and newly ordered medications with your provider and/or pharmacist. Follow medication instructions as directed by your provider and/or pharmacist. Please keep your medication list with you and share with your provider. Update the information when medications are discontinued, doses are changed, or new medications (including over-the-counter products) are added; and carry medication information at all times in the event of emergency situations     Allergies:  NKDA - (reactions not documented)               Medications  Valid as of: August 16, 2017 -  9:30 AM    Generic Name Brand Name Tablet Size Instructions for use    Calcium Citrate-Vitamin D   Take 1,200 mg by mouth every day.        Cyanocobalamin   Take 500 mcg by mouth every day.        Estrogens Conjugated (Tab) PREMARIN 0.45 MG Take  by mouth every day.        HydroCHLOROthiazide (Tab) HYDRODIURIL 12.5 MG Take 1 Tab by mouth every day.        Hydrocodone-Acetaminophen (Tab) NORCO  MG Take 1 Tab by mouth every 8 hours as needed for up to 30 days.        Lisinopril (Tab) PRINIVIL, ZESTRIL 40 MG Take 1 Tab by mouth every day.        MedroxyPROGESTERone Acetate (Tab) PROVERA 5 MG Take 2.5 mg by mouth every day. Daily x10d per month        Multiple Vitamin   Take  by mouth every day.        .                  Medicines prescribed today were sent to:     [facility] [address] - [place] (), [place] - [address]    [address] [place] [address] () [place] [address]    Phone: [phone] Fax: [phone]    Open 24 Hours?: No      Medication refill instructions:       If your prescription bottle indicates you have medication refills left, it is not necessary to call your provider’s office. Please contact your pharmacy and they will refill your medication.    If your prescription bottle indicates you do not have any refills left, you may request refills at any time through one of the following ways: The online [employer] system (except Urgent Care), by calling your provider’s office, or by asking your pharmacy to contact your provider’s office with a refill request. Medication refills are processed only during regular business hours and may not be available until the next business day. Your provider may request additional information or to have a follow-up visit with you prior to refilling your medication.   *Please Note: Medication refills are assigned a new Rx number when refilled electronically. Your pharmacy may indicate that no refills were authorized even though a new prescription for the same medication is available at the pharmacy. Please request the medicine by name with the pharmacy before contacting your provider for a refill.        Other Notes About Your Plan     Patient Enrolled in [facility] with Dr.John Reyes.   patient has been on Norco for her arthritis pain since 2009. ? Opioid dependence, continuous code 304.01                      [employer] Status: Patient Declined

## 2017-11-07 NOTE — ADDENDUM NOTE
Encounter addended by: Ronit Martinez on: 11/7/2017 11:48 AM<BR>    Actions taken: Flowsheet accepted

## 2017-11-16 ENCOUNTER — OFFICE VISIT (OUTPATIENT)
Dept: MEDICAL GROUP | Facility: MEDICAL CENTER | Age: 77
End: 2017-11-16
Payer: MEDICARE

## 2017-11-16 VITALS
HEART RATE: 84 BPM | BODY MASS INDEX: 23.49 KG/M2 | WEIGHT: 141 LBS | RESPIRATION RATE: 14 BRPM | HEIGHT: 65 IN | SYSTOLIC BLOOD PRESSURE: 130 MMHG | DIASTOLIC BLOOD PRESSURE: 80 MMHG | OXYGEN SATURATION: 97 %

## 2017-11-16 DIAGNOSIS — Z00.00 MEDICARE ANNUAL WELLNESS VISIT, SUBSEQUENT: ICD-10-CM

## 2017-11-16 DIAGNOSIS — M25.511 ACUTE PAIN OF RIGHT SHOULDER: ICD-10-CM

## 2017-11-16 DIAGNOSIS — I10 ESSENTIAL HYPERTENSION: ICD-10-CM

## 2017-11-16 DIAGNOSIS — Z79.891 CHRONIC PRESCRIPTION OPIATE USE: ICD-10-CM

## 2017-11-16 DIAGNOSIS — M81.0 OSTEOPOROSIS, SENILE: Chronic | ICD-10-CM

## 2017-11-16 DIAGNOSIS — M19.90 ARTHRITIS: ICD-10-CM

## 2017-11-16 PROCEDURE — 99212 OFFICE O/P EST SF 10 MIN: CPT | Mod: 25 | Performed by: INTERNAL MEDICINE

## 2017-11-16 PROCEDURE — G0439 PPPS, SUBSEQ VISIT: HCPCS | Mod: 25 | Performed by: INTERNAL MEDICINE

## 2017-11-16 RX ORDER — HYDROCODONE BITARTRATE AND ACETAMINOPHEN 10; 325 MG/1; MG/1
1 TABLET ORAL EVERY 8 HOURS PRN
Qty: 90 TAB | Refills: 0 | Status: SHIPPED | OUTPATIENT
Start: 2017-11-16 | End: 2017-11-16 | Stop reason: SDUPTHER

## 2017-11-16 RX ORDER — HYDROCODONE BITARTRATE AND ACETAMINOPHEN 10; 325 MG/1; MG/1
1 TABLET ORAL EVERY 8 HOURS PRN
Qty: 90 TAB | Refills: 0 | Status: SHIPPED | OUTPATIENT
Start: 2017-11-16 | End: 2018-02-16 | Stop reason: SDUPTHER

## 2017-11-16 RX ORDER — LISINOPRIL 40 MG/1
20 TABLET ORAL DAILY
Qty: 90 TAB | Refills: 3 | Status: SHIPPED | OUTPATIENT
Start: 2017-11-16 | End: 2018-01-19

## 2017-11-16 ASSESSMENT — PATIENT HEALTH QUESTIONNAIRE - PHQ9: CLINICAL INTERPRETATION OF PHQ2 SCORE: 0

## 2017-11-16 NOTE — PROGRESS NOTES
CC: Follow-up chronic pain due for wellness examination    HPI:   Nafisa presents today with the following.    1. Medicare annual wellness visit, subsequent  Screenings performed below information given on advanced directives    2. Chronic prescription opiate use  Analgesia:  not changed  Activity:  not changed  Adverse Events:  none  Aberrant Behaviors:  none  Affect/Mood: normal thought patterns  Last dose medication today      3. Chroinic pain of right shoulder  Status post surgery on both shoulders she has recovered nicely but still having intermittent pain. She does have multiple sites of arthritis of his hands using topical anti-inflammatories. She has not gone through extra pain medications.    4. Essential hypertension  Blood pressures been well controlled maintain on medication denying any chest pain or shortness of breath no edema.    5. Osteoporosis, senile  Not yet due for repeat testing denying any recent falls or fractures.    6. Arthritis  Does have multiple joints with arthralgias mostly hands in nature using topical anti-inflammatories. She also uses pain medications for these issues.      Depression Screening    Little interest or pleasure in doing things?   0  Feeling down, depressed , or hopeless? 0     Patient Health Questionnaire Score:      If depressive symptoms identified deferred to follow up visit unless specifically addressed in assessment and plan.    Interpretation of PHQ-9 Total Score   Score Severity   1-4 No Depression   5-9 Mild Depression   10-14 Moderate Depression   15-19 Moderately Severe Depression   20-27 Severe Depression      Screening for Cognitive Impairment    Three Minute Recall (apple, watch, edwina)   3/3    Draw clock face with all 12 numbers set to the hand to show 10 minutes past 11 o'clock     3/3  Cognitive concerns identified deferred for follow up unless specifically addressed in assessment and plan.    Fall Risk Assessment  No recent falls    Has the patient had  two or more falls in the last year or any fall with injury in the last year?       Safety Assessment    Throw rugs on floor.   n  Handrails on all stairs.   y  Good lighting in all hallways. y    Difficulty hearing.   n  Patient counseled about all safety risks that were identified.    Functional Assessment ADLs    Are there any barriers preventing you from cooking for yourself or meeting nutritional needs?   n.    Are there any barriers preventing you from driving safely or obtaining transportation?  n .    Are there any barriers preventing you from using a telephone or calling for help?  n .    Are there any barriers preventing you from shopping?   .    Are there any barriers preventing you from taking care of your own finances?   n.    Are there any barriers preventing you from managing your medications?  n .    Are currently engaging any exercise or physical activity?   .  y     Health Maintenance Summary                URINE DRUG SCREEN Next Due 12/1/2017      Done 12/6/2016 PAIN MANAGEMENT PANEL, SCRN W/ RFLX TO QNT     Patient has more history with this topic...    MAMMOGRAM Next Due 7/18/2018      Done 7/18/2017 MA-MAMMO DIAGNOSTIC UNILAT W/TOMOSYNTHESIS W/O CAD     Patient has more history with this topic...    BONE DENSITY Next Due 10/30/2018      Done 10/30/2013 DS-BONE DENSITY STUDY (DEXA)     Patient has more history with this topic...    Annual Wellness Visit Next Due 11/17/2018      Done 11/16/2017 Visit Dx: Medicare annual wellness visit, subsequent     Patient has more history with this topic...    IMM DTaP/Tdap/Td Vaccine Next Due 5/10/2022      Done 5/10/2012 Imm Admin: Tdap Vaccine    COLONOSCOPY Next Due 6/15/2022      Done 6/15/2012      Patient has more history with this topic...          Patient Care Team:  James Reyes M.D. as PCP - General  Artemio Galindo R.N. as Medical Home Care Manager ([facility])      Patient Active Problem List    Diagnosis Date Noted   •  "Essential hypertension 02/23/2017   • Right shoulder pain 02/16/2017   • Vitamin d deficiency 09/17/2012   • Arthritis 05/24/2011   • Osteoporosis, senile 06/18/2010       Current Outpatient Prescriptions   Medication Sig Dispense Refill   • lisinopril (PRINIVIL, ZESTRIL) 40 MG tablet Take 0.5 Tabs by mouth every day. 90 Tab 3   • hydrocodone/acetaminophen (NORCO)  MG Tab Take 1 Tab by mouth every 8 hours as needed for up to 30 days. 90 Tab 0   • hydrochlorothiazide (HYDRODIURIL) 12.5 MG tablet Take 1 Tab by mouth every day. 30 Tab 6   • Calcium Carbonate-Vitamin D (CALCIUM + D PO) Take 1,200 mg by mouth every day.     • medroxyPROGESTERone (PROVERA) 5 MG TABS Take 2.5 mg by mouth every day. Daily x10d per month     • Estrogens Conjugated (PREMARIN) 0.45 MG TABS Take  by mouth every day.     • Multiple Vitamin (MULTIVITAMINS PO) Take  by mouth every day.     • Cyanocobalamin (VITAMIN B-12 PO) Take 500 mcg by mouth every day.       No current facility-administered medications for this visit.          Allergies as of 11/16/2017 - Reviewed 08/16/2017   Allergen Reaction Noted   • Nkda [no known drug allergy]  09/22/2009        ROS: As per HPI.    /80   Pulse 84   Resp 14   Ht 1.651 m (5' 5\")   Wt 64 kg (141 lb)   SpO2 97%   BMI 23.46 kg/m²     Physical Exam:  Gen:         Alert and oriented, No apparent distress.  Neck:        No Lymphadenopathy or Bruits.  Lungs:     Clear to auscultation bilaterally  CV:          Regular rate and rhythm. No murmurs, rubs or gallops.  Abd:         Soft non tender, non distended. Normal active bowel sounds.  No  Hepatosplenomegaly, No pulsatile masses.                   Ext:          No clubbing, cyanosis, edema.      Assessment and Plan.   77 y.o. female with the following issues.    1. Medicare annual wellness visit, subsequent  Discussed healthy lifestyle habits as well as screening regimens.    2. Chronic prescription opiate use  Repeat drug contract as well as " drug screen today.  - CONTROLLED SUBSTANCE TREATMENT AGREEMENT  - [facility] PAIN MANAGEMENT SCREEN; Future    3. Acute pain of right shoulder  Refilled medications for the next 3 months we'll followup at that time. Pain is currently stable without new symptomology.  Controlled substance report reviewed and medications were deemed to be medically needed.  - hydrocodone/acetaminophen (NORCO)  MG Tab; Take 1 Tab by mouth every 8 hours as needed for up to 30 days.  Dispense: 90 Tab; Refill: 0    4. Essential hypertension  Currently well controlled, Discuss diet, exercise and salt restriction.  - lisinopril (PRINIVIL, ZESTRIL) 40 MG tablet; Take 0.5 Tabs by mouth every day.  Dispense: 90 Tab; Refill: 3    5. Osteoporosis, senile  Continue current medications and fall precautions    6. Arthritis  As above also topical anti-inflammatories.  - hydrocodone/acetaminophen (NORCO)  MG Tab; Take 1 Tab by mouth every 8 hours as needed for up to 30 days.  Dispense: 90 Tab; Refill: 0

## 2018-01-19 ENCOUNTER — TELEPHONE (OUTPATIENT)
Dept: MEDICAL GROUP | Facility: MEDICAL CENTER | Age: 78
End: 2018-01-19

## 2018-01-19 DIAGNOSIS — I10 ESSENTIAL HYPERTENSION: ICD-10-CM

## 2018-01-19 RX ORDER — LISINOPRIL 20 MG/1
20 TABLET ORAL DAILY
Qty: 90 TAB | Refills: 3 | Status: SHIPPED | OUTPATIENT
Start: 2018-01-19 | End: 2018-05-21 | Stop reason: SDUPTHER

## 2018-01-19 NOTE — TELEPHONE ENCOUNTER
Patient called and is requesting a new rx for Lisinopril 20mg. Patient is cutting 40mg  In half and states it would be easier if she had 20mg pills.

## 2018-02-16 ENCOUNTER — OFFICE VISIT (OUTPATIENT)
Dept: MEDICAL GROUP | Facility: MEDICAL CENTER | Age: 78
End: 2018-02-16
Payer: MEDICARE

## 2018-02-16 VITALS
HEART RATE: 88 BPM | RESPIRATION RATE: 16 BRPM | BODY MASS INDEX: 23.82 KG/M2 | HEIGHT: 65 IN | WEIGHT: 143 LBS | OXYGEN SATURATION: 92 % | SYSTOLIC BLOOD PRESSURE: 128 MMHG | DIASTOLIC BLOOD PRESSURE: 78 MMHG

## 2018-02-16 DIAGNOSIS — Z79.891 CHRONIC PRESCRIPTION OPIATE USE: ICD-10-CM

## 2018-02-16 DIAGNOSIS — M19.90 ARTHRITIS: ICD-10-CM

## 2018-02-16 PROCEDURE — 99213 OFFICE O/P EST LOW 20 MIN: CPT | Performed by: INTERNAL MEDICINE

## 2018-02-16 RX ORDER — HYDROCODONE BITARTRATE AND ACETAMINOPHEN 10; 325 MG/1; MG/1
1 TABLET ORAL EVERY 8 HOURS PRN
Qty: 90 TAB | Refills: 0 | Status: SHIPPED | OUTPATIENT
Start: 2018-02-16 | End: 2018-02-16 | Stop reason: SDUPTHER

## 2018-02-16 RX ORDER — HYDROCODONE BITARTRATE AND ACETAMINOPHEN 10; 325 MG/1; MG/1
1 TABLET ORAL EVERY 8 HOURS PRN
Qty: 90 TAB | Refills: 0 | Status: SHIPPED | OUTPATIENT
Start: 2018-02-16 | End: 2018-02-16

## 2018-02-16 RX ORDER — HYDROCODONE BITARTRATE AND ACETAMINOPHEN 10; 325 MG/1; MG/1
1 TABLET ORAL EVERY 8 HOURS PRN
Qty: 90 TAB | Refills: 0 | Status: SHIPPED | OUTPATIENT
Start: 2018-02-16 | End: 2018-05-21 | Stop reason: SDUPTHER

## 2018-02-16 NOTE — PROGRESS NOTES
"CC: Follow-up chronic pain.    HPI:   Nafisa presents today with the following.    1. Chronic prescription opiate use  Analgesia:  not changed  Activity:  worsened  Adverse Events:  none  Aberrant Behaviors:  none  Affect/Mood: normal thought patterns  Last dose medication today      2. Arthritis  Reports her arthritis is still present waxing and waning in nature under fair control. He does still get to be 6 out of 10 on intensity and occasion but again medications are sufficient. She denies any confusion from medication or constipation has been compliant with timing of refills.          Patient Active Problem List    Diagnosis Date Noted   • Essential hypertension 02/23/2017   • Right shoulder pain 02/16/2017   • Chronic prescription opiate use 02/18/2015   • Vitamin d deficiency 09/17/2012   • Arthritis 05/24/2011   • Osteoporosis, senile 06/18/2010       Current Outpatient Prescriptions   Medication Sig Dispense Refill   • HYDROcodone/acetaminophen (NORCO)  MG Tab Take 1 Tab by mouth every 8 hours as needed for up to 30 days. 90 Tab 0   • lisinopril (PRINIVIL) 20 MG Tab Take 1 Tab by mouth every day. 90 Tab 3   • hydrochlorothiazide (HYDRODIURIL) 12.5 MG tablet Take 1 Tab by mouth every day. 30 Tab 6   • Calcium Carbonate-Vitamin D (CALCIUM + D PO) Take 1,200 mg by mouth every day.     • medroxyPROGESTERone (PROVERA) 5 MG TABS Take 2.5 mg by mouth every day. Daily x10d per month     • Estrogens Conjugated (PREMARIN) 0.45 MG TABS Take  by mouth every day.     • Multiple Vitamin (MULTIVITAMINS PO) Take  by mouth every day.     • Cyanocobalamin (VITAMIN B-12 PO) Take 500 mcg by mouth every day.       No current facility-administered medications for this visit.          Allergies as of 02/16/2018 - Reviewed 11/16/2017   Allergen Reaction Noted   • Nkda [no known drug allergy]  09/22/2009        ROS: Denies Chest pain, SOB, LE edema.    /78   Pulse 88   Resp 16   Ht 1.651 m (5' 5\")   Wt 64.9 kg (143 " lb)   SpO2 92%   BMI 23.80 kg/m²      Physical Exam:  Gen:         Alert and oriented, No apparent distress.  Neck:        No Lymphadenopathy or Bruits.  Lungs:     Clear to auscultation bilaterally  CV:          Regular rate and rhythm. No murmurs, rubs or gallops.               Ext:          No clubbing, cyanosis, edema.      Assessment and Plan.   77 y.o. female with the following issues.    1. Chronic prescription opiate use  Consent signed today  - CONSENT FOR OPIATE PRESCRIPTION    2. Arthritis  Refilled medications for the next 3 months we'll followup at that time. Pain is currently stable without new symptomology.  Controlled substance report reviewed and medications were deemed to be medically needed.  - HYDROcodone/acetaminophen (NORCO)  MG Tab; Take 1 Tab by mouth every 8 hours as needed for up to 30 days.  Dispense: 90 Tab; Refill: 0    cetaminophen (NORCO)  MG Tab; Take 1 Tab by mouth every 8 hours as needed for up to 30 days.  Dispense: 90 Tab; Refill: 0

## 2018-05-21 ENCOUNTER — OFFICE VISIT (OUTPATIENT)
Dept: MEDICAL GROUP | Facility: MEDICAL CENTER | Age: 78
End: 2018-05-21
Payer: MEDICARE

## 2018-05-21 VITALS
HEART RATE: 66 BPM | BODY MASS INDEX: 23.66 KG/M2 | DIASTOLIC BLOOD PRESSURE: 80 MMHG | HEIGHT: 65 IN | SYSTOLIC BLOOD PRESSURE: 138 MMHG | TEMPERATURE: 97.6 F | WEIGHT: 142 LBS | RESPIRATION RATE: 16 BRPM | OXYGEN SATURATION: 95 %

## 2018-05-21 DIAGNOSIS — M19.90 ARTHRITIS: ICD-10-CM

## 2018-05-21 DIAGNOSIS — Z79.891 CHRONIC PRESCRIPTION OPIATE USE: ICD-10-CM

## 2018-05-21 PROCEDURE — 99213 OFFICE O/P EST LOW 20 MIN: CPT | Performed by: INTERNAL MEDICINE

## 2018-05-21 RX ORDER — HYDROCODONE BITARTRATE AND ACETAMINOPHEN 10; 325 MG/1; MG/1
1 TABLET ORAL EVERY 8 HOURS PRN
Qty: 90 TAB | Refills: 0 | Status: SHIPPED | OUTPATIENT
Start: 2018-05-21 | End: 2018-08-21 | Stop reason: SDUPTHER

## 2018-05-21 RX ORDER — HYDROCODONE BITARTRATE AND ACETAMINOPHEN 10; 325 MG/1; MG/1
1 TABLET ORAL EVERY 8 HOURS PRN
Qty: 90 TAB | Refills: 0 | Status: SHIPPED | OUTPATIENT
Start: 2018-05-21 | End: 2018-05-21 | Stop reason: SDUPTHER

## 2018-05-21 RX ORDER — LISINOPRIL 20 MG/1
20 TABLET ORAL DAILY
Qty: 90 TAB | Refills: 3 | Status: SHIPPED | OUTPATIENT
Start: 2018-05-21 | End: 2019-04-02 | Stop reason: SDUPTHER

## 2018-05-21 RX ORDER — HYDROCHLOROTHIAZIDE 12.5 MG/1
12.5 TABLET ORAL DAILY
Qty: 30 TAB | Refills: 6 | Status: SHIPPED | OUTPATIENT
Start: 2018-05-21 | End: 2018-05-21

## 2018-05-21 NOTE — PROGRESS NOTES
"CC: Follow-up chronic pain    HPI:   Nafisa presents today with the following.    1. Chronic prescription opiate use  Chronic Opiate therapy:  (5 A's)  Analgesia:  significantly improved  Activity:  improved  Adverse Events: None  Aberrant Behaviors: None  Affect/Mood: normal speech pattern and content  Last dose medication today        2. Arthritis  Patient has been on current prescription for several years never increasing dose.  She does not tolerate anti-inflammatories well because of her stomach.  She still has arthritis pains in both hands and shoulder and is quite active.      Patient Active Problem List    Diagnosis Date Noted   • Essential hypertension 02/23/2017   • Right shoulder pain 02/16/2017   • Chronic prescription opiate use 02/18/2015   • Vitamin d deficiency 09/17/2012   • Arthritis 05/24/2011   • Osteoporosis, senile 06/18/2010       Current Outpatient Prescriptions   Medication Sig Dispense Refill   • lisinopril (PRINIVIL) 20 MG Tab Take 1 Tab by mouth every day. 90 Tab 3   • HYDROcodone/acetaminophen (NORCO)  MG Tab Take 1 Tab by mouth every 8 hours as needed for up to 30 days. 90 Tab 0   • Estrogens Conjugated (PREMARIN) 0.45 MG Tab Take 1 Tab by mouth every day. 90 Tab 3   • Calcium Carbonate-Vitamin D (CALCIUM + D PO) Take 1,200 mg by mouth every day.     • medroxyPROGESTERone (PROVERA) 5 MG TABS Take 2.5 mg by mouth every day. Daily x10d per month     • Multiple Vitamin (MULTIVITAMINS PO) Take  by mouth every day.     • Cyanocobalamin (VITAMIN B-12 PO) Take 500 mcg by mouth every day.       No current facility-administered medications for this visit.          Allergies as of 05/21/2018 - Reviewed 05/21/2018   Allergen Reaction Noted   • Nkda [no known drug allergy]  09/22/2009        ROS: Denies Chest pain, SOB, LE edema.    /80   Pulse 66   Temp 36.4 °C (97.6 °F)   Resp 16   Ht 1.651 m (5' 5\")   Wt 64.4 kg (142 lb)   SpO2 95%   BMI 23.63 kg/m²     Physical Exam:  Gen:    "      Alert and oriented, No apparent distress.  Neck:        No Lymphadenopathy or Bruits.  Lungs:     Clear to auscultation bilaterally  CV:          Regular rate and rhythm. No murmurs, rubs or gallops.               Ext:          No clubbing, cyanosis, edema.      Assessment and Plan.   77 y.o. female with the following issues.    1. Chronic prescription opiate use  Already on contract with acceptable urine drug screen.    2. Arthritis  Refilled medications for the next 3 months we'll followup at that time. Pain is currently stable without new symptomology.  Controlled substance report reviewed and medications were deemed to be medically needed.  - HYDROcodone/acetaminophen (NORCO)  MG Tab; Take 1 Tab by mouth every 8 hours as needed for up to 30 days.  Dispense: 90 Tab; Refill: 0

## 2018-07-19 ENCOUNTER — HOSPITAL ENCOUNTER (OUTPATIENT)
Dept: RADIOLOGY | Facility: MEDICAL CENTER | Age: 78
End: 2018-07-19
Attending: INTERNAL MEDICINE
Payer: MEDICARE

## 2018-07-19 DIAGNOSIS — Z12.39 SCREENING BREAST EXAMINATION: ICD-10-CM

## 2018-07-19 PROCEDURE — 77067 SCR MAMMO BI INCL CAD: CPT

## 2018-08-21 ENCOUNTER — OFFICE VISIT (OUTPATIENT)
Dept: MEDICAL GROUP | Facility: MEDICAL CENTER | Age: 78
End: 2018-08-21
Payer: MEDICARE

## 2018-08-21 VITALS
SYSTOLIC BLOOD PRESSURE: 110 MMHG | RESPIRATION RATE: 12 BRPM | DIASTOLIC BLOOD PRESSURE: 76 MMHG | HEIGHT: 65 IN | WEIGHT: 143 LBS | HEART RATE: 80 BPM | BODY MASS INDEX: 23.82 KG/M2 | OXYGEN SATURATION: 91 %

## 2018-08-21 DIAGNOSIS — Z79.891 CHRONIC PRESCRIPTION OPIATE USE: ICD-10-CM

## 2018-08-21 DIAGNOSIS — M19.90 ARTHRITIS: ICD-10-CM

## 2018-08-21 DIAGNOSIS — Z13.6 SCREENING FOR CARDIOVASCULAR CONDITION: ICD-10-CM

## 2018-08-21 PROCEDURE — 99213 OFFICE O/P EST LOW 20 MIN: CPT | Performed by: INTERNAL MEDICINE

## 2018-08-21 RX ORDER — HYDROCODONE BITARTRATE AND ACETAMINOPHEN 10; 325 MG/1; MG/1
1 TABLET ORAL EVERY 8 HOURS PRN
Qty: 90 TAB | Refills: 0 | Status: SHIPPED | OUTPATIENT
Start: 2018-09-01 | End: 2018-08-21 | Stop reason: SDUPTHER

## 2018-08-21 RX ORDER — HYDROCODONE BITARTRATE AND ACETAMINOPHEN 10; 325 MG/1; MG/1
1 TABLET ORAL EVERY 8 HOURS PRN
Qty: 90 TAB | Refills: 0 | Status: SHIPPED | OUTPATIENT
Start: 2018-10-01 | End: 2018-08-21 | Stop reason: SDUPTHER

## 2018-08-21 RX ORDER — HYDROCODONE BITARTRATE AND ACETAMINOPHEN 10; 325 MG/1; MG/1
1 TABLET ORAL EVERY 8 HOURS PRN
Qty: 90 TAB | Refills: 0 | Status: SHIPPED | OUTPATIENT
Start: 2018-11-01 | End: 2018-11-15 | Stop reason: SDUPTHER

## 2018-08-21 NOTE — PROGRESS NOTES
"CC: Follow-up chronic pain.    HPI:   Nafisa presents today with the following.    1. Chronic prescription opiate use  Chronic Opiate therapy:  (5 A's)  Analgesia:  not changed  Activity:  not changed  Adverse Events:  none  Aberrant Behaviors:  none  Affect/Mood: normal perception  Last dose medication tdoay        2. Arthritis  Reports pain is stable in general denying any significant advancement.  She is still using topical anti-inflammatories mostly for her hands.  Denies any new pain complaints no confusion or constipation from medication.          Patient Active Problem List    Diagnosis Date Noted   • Essential hypertension 02/23/2017   • Right shoulder pain 02/16/2017   • Chronic prescription opiate use 02/18/2015   • Vitamin d deficiency 09/17/2012   • Arthritis 05/24/2011   • Osteoporosis, senile 06/18/2010       Current Outpatient Prescriptions   Medication Sig Dispense Refill   • [START ON 11/1/2018] HYDROcodone/acetaminophen (NORCO)  MG Tab Take 1 Tab by mouth every 8 hours as needed for up to 30 days. 90 Tab 0   • lisinopril (PRINIVIL) 20 MG Tab Take 1 Tab by mouth every day. 90 Tab 3   • Estrogens Conjugated (PREMARIN) 0.45 MG Tab Take 1 Tab by mouth every day. 90 Tab 3   • Calcium Carbonate-Vitamin D (CALCIUM + D PO) Take 1,200 mg by mouth every day.     • medroxyPROGESTERone (PROVERA) 5 MG TABS Take 2.5 mg by mouth every day. Daily x10d per month     • Multiple Vitamin (MULTIVITAMINS PO) Take  by mouth every day.     • Cyanocobalamin (VITAMIN B-12 PO) Take 500 mcg by mouth every day.       No current facility-administered medications for this visit.          Allergies as of 08/21/2018 - Reviewed 08/21/2018   Allergen Reaction Noted   • Nkda [no known drug allergy]  09/22/2009        ROS: Denies Chest pain, SOB, LE edema.    /76   Pulse 80   Resp 12   Ht 1.651 m (5' 5\")   Wt 64.9 kg (143 lb)   SpO2 91%   BMI 23.80 kg/m²     Physical Exam:  Gen:         Alert and oriented, No " apparent distress.  Neck:        No Lymphadenopathy or Bruits.  Lungs:     Clear to auscultation bilaterally  CV:          Regular rate and rhythm. No murmurs, rubs or gallops.               Ext:          No clubbing, cyanosis, edema.      Assessment and Plan.   77 y.o. female with the following issues.    1. Chronic prescription opiate use  Already on contract    2. Arthritis  Refilled medications for the next 3 months we'll followup at that time. Pain is currently stable without new symptomology.  Controlled substance report reviewed and medications were deemed to be medically needed.  - HYDROcodone/acetaminophen (NORCO)  MG Tab; Take 1 Tab by mouth every 8 hours as needed for up to 30 days.  Dispense: 90 Tab; Refill: 0    3. Screening for cardiovascular condition    - COMP METABOLIC PANEL; Future  - LIPID PROFILE; Future

## 2018-10-27 ENCOUNTER — HOSPITAL ENCOUNTER (OUTPATIENT)
Dept: LAB | Facility: MEDICAL CENTER | Age: 78
End: 2018-10-27
Attending: INTERNAL MEDICINE
Payer: MEDICARE

## 2018-10-27 DIAGNOSIS — Z13.6 SCREENING FOR CARDIOVASCULAR CONDITION: ICD-10-CM

## 2018-10-27 LAB
ALBUMIN SERPL BCP-MCNC: 4.5 G/DL (ref 3.2–4.9)
ALBUMIN/GLOB SERPL: 1.3 G/DL
ALP SERPL-CCNC: 65 U/L (ref 30–99)
ALT SERPL-CCNC: 13 U/L (ref 2–50)
ANION GAP SERPL CALC-SCNC: 9 MMOL/L (ref 0–11.9)
AST SERPL-CCNC: 18 U/L (ref 12–45)
BILIRUB SERPL-MCNC: 0.4 MG/DL (ref 0.1–1.5)
BUN SERPL-MCNC: 15 MG/DL (ref 8–22)
CALCIUM SERPL-MCNC: 9.9 MG/DL (ref 8.5–10.5)
CHLORIDE SERPL-SCNC: 105 MMOL/L (ref 96–112)
CHOLEST SERPL-MCNC: 233 MG/DL (ref 100–199)
CO2 SERPL-SCNC: 25 MMOL/L (ref 20–33)
CREAT SERPL-MCNC: 0.78 MG/DL (ref 0.5–1.4)
FASTING STATUS PATIENT QL REPORTED: NORMAL
GLOBULIN SER CALC-MCNC: 3.5 G/DL (ref 1.9–3.5)
GLUCOSE SERPL-MCNC: 88 MG/DL (ref 65–99)
HDLC SERPL-MCNC: 80 MG/DL
LDLC SERPL CALC-MCNC: 112 MG/DL
POTASSIUM SERPL-SCNC: 4.2 MMOL/L (ref 3.6–5.5)
PROT SERPL-MCNC: 8 G/DL (ref 6–8.2)
SODIUM SERPL-SCNC: 139 MMOL/L (ref 135–145)
TRIGL SERPL-MCNC: 204 MG/DL (ref 0–149)

## 2018-10-27 PROCEDURE — 36415 COLL VENOUS BLD VENIPUNCTURE: CPT

## 2018-10-27 PROCEDURE — 80053 COMPREHEN METABOLIC PANEL: CPT

## 2018-10-27 PROCEDURE — 80061 LIPID PANEL: CPT

## 2018-11-15 ENCOUNTER — OFFICE VISIT (OUTPATIENT)
Dept: MEDICAL GROUP | Facility: MEDICAL CENTER | Age: 78
End: 2018-11-15
Payer: MEDICARE

## 2018-11-15 VITALS
HEIGHT: 65 IN | TEMPERATURE: 97.8 F | OXYGEN SATURATION: 95 % | RESPIRATION RATE: 16 BRPM | BODY MASS INDEX: 24.49 KG/M2 | WEIGHT: 147 LBS | DIASTOLIC BLOOD PRESSURE: 74 MMHG | SYSTOLIC BLOOD PRESSURE: 118 MMHG | HEART RATE: 94 BPM

## 2018-11-15 DIAGNOSIS — I10 ESSENTIAL HYPERTENSION: ICD-10-CM

## 2018-11-15 DIAGNOSIS — Z00.00 MEDICARE ANNUAL WELLNESS VISIT, SUBSEQUENT: ICD-10-CM

## 2018-11-15 DIAGNOSIS — M81.0 OSTEOPOROSIS, SENILE: Chronic | ICD-10-CM

## 2018-11-15 DIAGNOSIS — N95.9 POST MENOPAUSAL PROBLEMS: ICD-10-CM

## 2018-11-15 DIAGNOSIS — Z79.891 CHRONIC PRESCRIPTION OPIATE USE: ICD-10-CM

## 2018-11-15 DIAGNOSIS — E78.5 DYSLIPIDEMIA: ICD-10-CM

## 2018-11-15 DIAGNOSIS — M19.90 ARTHRITIS: ICD-10-CM

## 2018-11-15 PROCEDURE — 99213 OFFICE O/P EST LOW 20 MIN: CPT | Mod: 25 | Performed by: INTERNAL MEDICINE

## 2018-11-15 PROCEDURE — G0439 PPPS, SUBSEQ VISIT: HCPCS | Performed by: INTERNAL MEDICINE

## 2018-11-15 RX ORDER — HYDROCODONE BITARTRATE AND ACETAMINOPHEN 10; 325 MG/1; MG/1
1 TABLET ORAL EVERY 8 HOURS PRN
Qty: 90 TAB | Refills: 0 | Status: SHIPPED | OUTPATIENT
Start: 2019-01-01 | End: 2018-11-15 | Stop reason: SDUPTHER

## 2018-11-15 RX ORDER — HYDROCODONE BITARTRATE AND ACETAMINOPHEN 10; 325 MG/1; MG/1
1 TABLET ORAL EVERY 8 HOURS PRN
Qty: 90 TAB | Refills: 0 | Status: SHIPPED | OUTPATIENT
Start: 2018-12-01 | End: 2018-11-15 | Stop reason: SDUPTHER

## 2018-11-15 RX ORDER — HYDROCODONE BITARTRATE AND ACETAMINOPHEN 10; 325 MG/1; MG/1
1 TABLET ORAL EVERY 8 HOURS PRN
Qty: 90 TAB | Refills: 0 | Status: SHIPPED | OUTPATIENT
Start: 2019-02-01 | End: 2019-02-15 | Stop reason: SDUPTHER

## 2018-11-15 ASSESSMENT — PATIENT HEALTH QUESTIONNAIRE - PHQ9: CLINICAL INTERPRETATION OF PHQ2 SCORE: 0

## 2018-11-15 ASSESSMENT — ENCOUNTER SYMPTOMS: GENERAL WELL-BEING: EXCELLENT

## 2018-11-15 ASSESSMENT — ACTIVITIES OF DAILY LIVING (ADL): BATHING_REQUIRES_ASSISTANCE: 0

## 2018-11-15 NOTE — PROGRESS NOTES
CC: Follow-up chronic pain due for wellness examination.    HPI:   Nafisa presents today with the following.    1. Medicare annual wellness visit, subsequent  Screenings performed below information given on advanced directives.    2. Chronic prescription opiate use  Chronic Opiate therapy:  (5 A's)  Analgesia:  not changed  Activity:  not changed  Adverse Events:  none  Aberrant Behaviors:  none  Affect/Mood: normal speech pattern and content  Last dose medication today        3. Arthritis  Reporting arthritis is still present and persistent taking up to 3 pills a day she has days where she does not take more than 1.  Does get out to 8 out of 10 in intensity is worse with activity.  She denies any new symptomology or complaints.  Depression Screening    Little interest or pleasure in doing things?  0 - not at all  Feeling down, depressed , or hopeless? 0 - not at all  Patient Health Questionnaire Score: 0     If depressive symptoms identified deferred to follow up visit unless specifically addressed in assessment and plan.    Interpretation of PHQ-9 Total Score   Score Severity   1-4 No Depression   5-9 Mild Depression   10-14 Moderate Depression   15-19 Moderately Severe Depression   20-27 Severe Depression    Screening for Cognitive Impairment    Three Minute Recall (leader, season, table) 3/3    Albert clock face with all 12 numbers and set the hands to show 10 past 11.  Yes    Cognitive concerns identified deferred for follow up unless specifically addressed in assessment and plan.    Fall Risk Assessment    Has the patient had two or more falls in the last year or any fall with injury in the last year?  No    Safety Assessment    Throw rugs on floor.  No  Handrails on all stairs.  Yes  Good lighting in all hallways.  Yes  Difficulty hearing.  No  Patient counseled about all safety risks that were identified.    Functional Assessment ADLs    Are there any barriers preventing you from cooking for yourself or  meeting nutritional needs?  No.    Are there any barriers preventing you from driving safely or obtaining transportation?  No.    Are there any barriers preventing you from using a telephone or calling for help?  No.    Are there any barriers preventing you from shopping?  No.    Are there any barriers preventing you from taking care of your own finances?  No.    Are there any barriers preventing you from managing your medications?  No.    Are there any barriers preventing you from showering, bathing or dressing yourself?  No.    Are you currently engaging in any exercise or physical activity?  Yes.     What is your perception of your health?  Excellent.      Health Maintenance Summary                BONE DENSITY Overdue 10/30/2018      Done 10/30/2013 DS-BONE DENSITY STUDY (DEXA)     Patient has more history with this topic...    URINE DRUG SCREEN Overdue 11/20/2018      [facility]     Patient has more history with this topic...    IMM ZOSTER VACCINES Postponed 11/13/2035 Originally 4/19/2016. Insurance/Financial     Done 2/23/2016 Imm Admin: Zoster Vaccine Live (ZVL) (Zostavax)    MAMMOGRAM Next Due 7/19/2019      Done 7/19/2018 MA-MAMMO SCREENING BILAT W/TOMOSYNTHESIS W/CAD     Patient has more history with this topic...    Annual Wellness Visit Next Due 11/16/2019      Done 11/15/2018 Visit Dx: Medicare annual wellness visit, subsequent     Patient has more history with this topic...    IMM DTaP/Tdap/Td Vaccine Next Due 5/10/2022      Done 5/10/2012 Imm Admin: Tdap Vaccine    COLONOSCOPY Next Due 6/15/2022      Done 6/15/2012      Patient has more history with this topic...          Patient Care Team:  James Reyes M.D. as PCP - General  Artemio Galindo R.N. as Medical Home Care Manager ([facility])        Patient Active Problem List    Diagnosis Date Noted   • Dyslipidemia 11/15/2018   • Essential hypertension 02/23/2017   • Right shoulder pain  "02/16/2017   • Chronic prescription opiate use 02/18/2015   • Vitamin d deficiency 09/17/2012   • Arthritis 05/24/2011   • Osteoporosis, senile 06/18/2010       Current Outpatient Prescriptions   Medication Sig Dispense Refill   • [START ON 2/1/2019] HYDROcodone/acetaminophen (NORCO)  MG Tab Take 1 Tab by mouth every 8 hours as needed for up to 30 days. 90 Tab 0   • lisinopril (PRINIVIL) 20 MG Tab Take 1 Tab by mouth every day. 90 Tab 3   • Calcium Carbonate-Vitamin D (CALCIUM + D PO) Take 1,200 mg by mouth every day.     • Multiple Vitamin (MULTIVITAMINS PO) Take  by mouth every day.     • Cyanocobalamin (VITAMIN B-12 PO) Take 500 mcg by mouth every day.       No current facility-administered medications for this visit.          Allergies as of 11/15/2018 - Reviewed 11/15/2018   Allergen Reaction Noted   • Nkda [no known drug allergy]  09/22/2009        ROS: Denies Chest pain, SOB, LE edema.    /74 (BP Location: Left arm)   Pulse 94   Temp 36.6 °C (97.8 °F)   Resp 16   Ht 1.651 m (5' 5\")   Wt 66.7 kg (147 lb)   SpO2 95%   BMI 24.46 kg/m²     Physical Exam:  Gen:         Alert and oriented, No apparent distress.  Neck:        No Lymphadenopathy or Bruits.  Lungs:     Clear to auscultation bilaterally  CV:          Regular rate and rhythm. No murmurs, rubs or gallops.               Ext:          No clubbing, cyanosis, edema.      Assessment and Plan.   78 y.o. female with the following issues.    1. Medicare annual wellness visit, subsequent  Discussed healthy lifestyle habits as well as screening regimens.    2. Chronic prescription opiate use  Already on contract repeat urine drug screen today.  - [facility] SCREEN; Future    3. Arthritis  Refilled medications for the next 3 months we'll followup at that time. Pain is currently stable without new symptomology.  Controlled substance report reviewed and medications were deemed to be medically needed.  - HYDROcodone/acetaminophen " (NORCO)  MG Tab; Take 1 Tab by mouth every 8 hours as needed for up to 30 days.  Dispense: 90 Tab; Refill: 0    4. Osteoporosis, senile  Repeat bone density    5. Essential hypertension  Currently well controlled, Discuss diet, exercise and salt restriction.  No change to medication therapy.    6. Dyslipidemia  Lipids currently well controlled. Discussed continued diet and exercise recheck 6 months to 1 year.    7. Post menopausal problems    - DS-BONE DENSITY STUDY (DEXA); Future

## 2019-02-15 ENCOUNTER — OFFICE VISIT (OUTPATIENT)
Dept: MEDICAL GROUP | Facility: MEDICAL CENTER | Age: 79
End: 2019-02-15
Payer: MEDICARE

## 2019-02-15 VITALS
DIASTOLIC BLOOD PRESSURE: 62 MMHG | HEIGHT: 65 IN | RESPIRATION RATE: 14 BRPM | HEART RATE: 84 BPM | WEIGHT: 146 LBS | BODY MASS INDEX: 24.32 KG/M2 | OXYGEN SATURATION: 94 % | SYSTOLIC BLOOD PRESSURE: 110 MMHG

## 2019-02-15 DIAGNOSIS — I10 ESSENTIAL HYPERTENSION: ICD-10-CM

## 2019-02-15 DIAGNOSIS — Z79.891 CHRONIC PRESCRIPTION OPIATE USE: ICD-10-CM

## 2019-02-15 DIAGNOSIS — M19.90 ARTHRITIS: ICD-10-CM

## 2019-02-15 PROCEDURE — 8041 PR SCP AHA: Performed by: INTERNAL MEDICINE

## 2019-02-15 PROCEDURE — 99214 OFFICE O/P EST MOD 30 MIN: CPT | Performed by: INTERNAL MEDICINE

## 2019-02-15 RX ORDER — CELECOXIB 200 MG/1
200 CAPSULE ORAL DAILY
Qty: 90 CAP | Refills: 3 | Status: SHIPPED | OUTPATIENT
Start: 2019-02-15 | End: 2019-05-15

## 2019-02-15 RX ORDER — HYDROCODONE BITARTRATE AND ACETAMINOPHEN 10; 325 MG/1; MG/1
1 TABLET ORAL EVERY 8 HOURS PRN
Qty: 90 TAB | Refills: 0 | Status: SHIPPED | OUTPATIENT
Start: 2019-03-01 | End: 2019-02-15 | Stop reason: SDUPTHER

## 2019-02-15 RX ORDER — HYDROCODONE BITARTRATE AND ACETAMINOPHEN 10; 325 MG/1; MG/1
1 TABLET ORAL EVERY 8 HOURS PRN
Qty: 90 TAB | Refills: 0 | Status: SHIPPED | OUTPATIENT
Start: 2019-05-01 | End: 2019-05-15 | Stop reason: SDUPTHER

## 2019-02-15 RX ORDER — HYDROCODONE BITARTRATE AND ACETAMINOPHEN 10; 325 MG/1; MG/1
1 TABLET ORAL EVERY 8 HOURS PRN
Qty: 90 TAB | Refills: 0 | Status: SHIPPED | OUTPATIENT
Start: 2019-04-01 | End: 2019-02-15 | Stop reason: SDUPTHER

## 2019-02-15 NOTE — PROGRESS NOTES
Annual Health Assessment Questions:    1.  Are you currently engaging in any exercise or physical activity? Yes    2.  How would you describe your mood or emotional well-being today? good    3.  Have you had any falls in the last year? No    4.  Have you noticed any problems with your balance or had difficulty walking? No    5.  In the last six months have you experienced any leakage of urine? No    6. DPA/Advanced Directive: Patient does not have an Advanced Directive.  A packet and workshop information was given on Advanced Directives.  CC: Follow-up chronic pain and blood pressure.    HPI:   Nafisa presents today with the following.    1. Chronic prescription opiate use  Chronic Opiate therapy:  (5 A's)  Analgesia:  not changed  Activity:  not changed  Adverse Events:  none  Aberrant Behaviors:  none  Affect/Mood: normal thought patterns  Last dose medication today        2. Arthritis  Reports pain does wax and wane in severity she is having issues with hands as well as back.  She is tried topical anti-inflammatories.  She does have hypertension but is been well controlled and kidney function has been regularly normal.  She does not have multiple prescribers and reports her pain is adequately controlled on current regimen is willing to explore other avenues.    3. Essential hypertension  Again blood pressure has been adequately controlled for some time.      Patient Active Problem List    Diagnosis Date Noted   • Dyslipidemia 11/15/2018   • Essential hypertension 02/23/2017   • Right shoulder pain 02/16/2017   • Chronic prescription opiate use 02/18/2015   • Vitamin d deficiency 09/17/2012   • Arthritis 05/24/2011   • Osteoporosis, senile 06/18/2010       Current Outpatient Prescriptions   Medication Sig Dispense Refill   • celecoxib (CELEBREX) 200 MG Cap Take 1 Cap by mouth every day. 90 Cap 3   • [START ON 5/1/2019] HYDROcodone/acetaminophen (NORCO)  MG Tab Take 1 Tab by mouth every 8 hours as needed for  "up to 30 days. 90 Tab 0   • lisinopril (PRINIVIL) 20 MG Tab Take 1 Tab by mouth every day. 90 Tab 3   • Calcium Carbonate-Vitamin D (CALCIUM + D PO) Take 1,200 mg by mouth every day.     • Multiple Vitamin (MULTIVITAMINS PO) Take  by mouth every day.     • Cyanocobalamin (VITAMIN B-12 PO) Take 500 mcg by mouth every day.       No current facility-administered medications for this visit.          Allergies as of 02/15/2019 - Reviewed 02/15/2019   Allergen Reaction Noted   • Nkda [no known drug allergy]  09/22/2009        ROS: Denies Chest pain, SOB, LE edema.    /62   Pulse 84   Resp 14   Ht 1.651 m (5' 5\")   Wt 66.2 kg (146 lb)   SpO2 94%   BMI 24.30 kg/m²     Physical Exam:  Gen:         Alert and oriented, No apparent distress.  Neck:        No Lymphadenopathy or Bruits.  Lungs:     Clear to auscultation bilaterally  CV:          Regular rate and rhythm. No murmurs, rubs or gallops.               Ext:          No clubbing, cyanosis, edema.      Assessment and Plan.   78 y.o. female with the following issues.    1. Chronic prescription opiate use  Already on contract with acceptable urine drug screen.    2. Arthritis  Refilled medications for the next 3 months we'll followup at that time. Pain is currently stable without new symptomology.  Controlled substance report reviewed and medications were deemed to be medically needed.  Have started on a trial of Celebrex 200 mg daily to try and reduce her pain medications to some degree.  - HYDROcodone/acetaminophen (NORCO)  MG Tab; Take 1 Tab by mouth every 8 hours as needed for up to 30 days.  Dispense: 90 Tab; Refill: 0  - Celebrex   200mg daily.      3. Essential hypertension  Currently well controlled, Discuss diet, exercise and salt restriction.  No change to medication therapy.  She will bring in blood pressure log after starting Celebrex.      "

## 2019-03-14 NOTE — H&P
CHIEF COMPLAINT:  Admission requested by anesthesiology for hypertensive   urgency.    PRIMARY MEDICAL PHYSICIAN:  Dr. James Reyes.    HISTORY OF PRESENT ILLNESS:  This is a pleasant 76-year-old female with   minimal medical issues other than chronic hypertension, who was admitted by   orthopedic surgery today for a scheduled right rotator cuff repair.  In the   preoperative area, the patient had vital signs obtained, which shows   significantly elevated blood pressures.  The patient had been monitored for   some time in the preoperative area; however, her blood pressures remained   elevated and the hospitalist services were consulted by the anesthesiologist   for admission for hypertensive urgency.  The patient states that she is   followed by Dr. Reyes on a regular basis for hypertension.  She is on a very   low dose of lisinopril at home at 10 mg p.o. daily.  She typically takes her   medication in the daytime.  However, she last took her medications last night   because she was instructed to be n.p.o. in the morning for her surgery.  She   reports that she does have mild white coat syndrome; however, her blood   pressures are typically well controlled when she visits her doctor, which is   quite frequently every 3 months.  She does not keep a blood pressure log at   home otherwise.  She states that at her doctor's office, her systolic blood   pressures typically run in the 120s-130s with her diastolic pressures in the   80s-90s.  Currently, she is asymptomatic.  She denies any headaches, any   vision changes, any chest pain or shortness of breath.  She denies any   paresthesias or numbness.  She denies any weaknesses in any of her   extremities.  Her only complaint is her right shoulder pain which is chronic.    REVIEW OF SYSTEMS:  A full review of systems was completed and all pertinent   positives and negatives are included in the HPI above.    PAST MEDICAL HISTORY:  Hypertension.    PAST SURGICAL HISTORY:   Patient denies.    SOCIAL HISTORY:  Patient denies any tobacco or illicit drugs.  The patient   reports very light alcohol intake at 1 drink per week.  She is a retired   .      FAMILY HISTORY:  Negative for MIs or CVAs.  Negative for hypertension,   hyperlipidemia, or diabetes mellitus.    ALLERGIES:  No known drug allergies.    HOME MEDICATIONS:    1.  Ibuprofen over-the-counter as needed.   2.  Lisinopril 10 mg p.o. daily.   3.  Calcium and vitamin D.  4.  Provera.  5.  Premarin.  6.  Multivitamin.  7.  Vitamin B12.    PHYSICAL EXAMINATION:  VITAL SIGNS:  Temperature 97.3, heart rate 68, respirations 16 and blood   pressure is 216/104.  Patient is saturating at 96% on room air.    GENERAL:  This is a well-appearing older white female who is in no acute   distress.  HEENT:  Normocephalic, atraumatic.  EOMI, moist mucous membranes.  NECK:  Supple, there is no JVD.  There is no supraclavicular adenopathy.  CARDIOVASCULAR:  Positive S1, S2, regular rate and rhythm.  No murmurs, rubs,   or gallops appreciated.  PULMONARY:  Clear to auscultation bilaterally.  No wheezes, rubs, or rhonchi   heard.  GASTROINTESTINAL:  Abdomen soft, nontender, nondistended.  Positive bowel   sounds.  No hepatosplenomegaly.  EXTREMITIES:  Warm, well-perfused.  No clubbing, cyanosis, or edema.    Capillary refill less than 3 seconds.  Distal pulses are intact.  NEUROLOGIC:  A and O x3.  Cranial nerves II-XII grossly intact.    LABORATORY DATA:  CBC and CMP are pending.    EKG, by my read, regular rhythm with rate of approximately 96.  Patient has a   flattening of the T waves in the lateral leads V3, V4, V5 and V6.  No ST   elevations or depressions.  When compared with an EKG that was obtained 9 days   ago, the T-wave flattening in leads V3, V4, V5 and V6 are new.    ASSESSMENT AND PLAN:  This is a 76-year-old female who presented for an   elective right rotator cuff repair.  Upon assessment in the preoperative area,   she was  noted to have hypertensive urgency and hospitalist services were   consulted for additional management.  1.  Hypertensive urgency:  I am waiting for labs to be drawn.  At this point,   she is not confused and has no focal deficits; therefore, she has no clear   evidence of end organ damage at this time.  I am still waiting for a troponin   level, BMP and a CBC.  She will be monitored closely on the telemetry floor   and I will obtain serial troponins.  I will also obtain serial EKGs and ECHO in light   of the new changes seen on her lateral leads.  The patient will be started on   p.r.n. labetalol and hydralazine.  If her blood pressure corrects well with   these p.r.n. medications, then I will restart her home lisinopril.  If her   blood pressure does not correct with repeated dosages of p.r.n. IV   antihypertensives, then she will need to be moved to the ICU to start her on a   nifedipine drip.      DISPOSITION:  Telemetry.      PROPHYLAXIS:  Sequential compression devices for DVT prophylaxis, no PPI   indicated, stool softeners ordered.      CODE:  Full.       ____________________________________     LARA MONTES DE OCA MD    [facility] / [facility]    DD:  02/16/2017 13:20:53  DT:  02/16/2017 15:37:12    D#:  [id number]  Job#:  [id number]   (4) excellent

## 2019-04-02 RX ORDER — LISINOPRIL 20 MG/1
20 TABLET ORAL DAILY
Qty: 90 TAB | Refills: 3 | Status: SHIPPED | OUTPATIENT
Start: 2019-04-02 | End: 2019-08-22 | Stop reason: SDUPTHER

## 2019-05-15 ENCOUNTER — OFFICE VISIT (OUTPATIENT)
Dept: MEDICAL GROUP | Facility: MEDICAL CENTER | Age: 79
End: 2019-05-15
Payer: MEDICARE

## 2019-05-15 VITALS
SYSTOLIC BLOOD PRESSURE: 126 MMHG | HEART RATE: 83 BPM | OXYGEN SATURATION: 96 % | DIASTOLIC BLOOD PRESSURE: 60 MMHG | TEMPERATURE: 97 F | BODY MASS INDEX: 24.49 KG/M2 | HEIGHT: 65 IN | WEIGHT: 147 LBS

## 2019-05-15 DIAGNOSIS — M19.90 ARTHRITIS: ICD-10-CM

## 2019-05-15 DIAGNOSIS — Z79.891 CHRONIC PRESCRIPTION OPIATE USE: ICD-10-CM

## 2019-05-15 DIAGNOSIS — I10 ESSENTIAL HYPERTENSION: ICD-10-CM

## 2019-05-15 DIAGNOSIS — G89.29 CHRONIC RIGHT SHOULDER PAIN: ICD-10-CM

## 2019-05-15 DIAGNOSIS — M25.511 CHRONIC RIGHT SHOULDER PAIN: ICD-10-CM

## 2019-05-15 PROCEDURE — 99214 OFFICE O/P EST MOD 30 MIN: CPT | Performed by: INTERNAL MEDICINE

## 2019-05-15 RX ORDER — HYDROCODONE BITARTRATE AND ACETAMINOPHEN 10; 325 MG/1; MG/1
1 TABLET ORAL EVERY 8 HOURS PRN
Qty: 90 TAB | Refills: 0 | Status: SHIPPED | OUTPATIENT
Start: 2019-08-01 | End: 2019-08-31

## 2019-05-15 RX ORDER — HYDROCODONE BITARTRATE AND ACETAMINOPHEN 10; 325 MG/1; MG/1
1 TABLET ORAL EVERY 8 HOURS PRN
Qty: 90 TAB | Refills: 0 | Status: SHIPPED | OUTPATIENT
Start: 2019-07-01 | End: 2019-05-15 | Stop reason: SDUPTHER

## 2019-05-15 RX ORDER — HYDROCODONE BITARTRATE AND ACETAMINOPHEN 10; 325 MG/1; MG/1
1 TABLET ORAL EVERY 8 HOURS PRN
Qty: 90 TAB | Refills: 0 | Status: SHIPPED | OUTPATIENT
Start: 2019-06-01 | End: 2019-05-15 | Stop reason: SDUPTHER

## 2019-05-15 ASSESSMENT — PATIENT HEALTH QUESTIONNAIRE - PHQ9: CLINICAL INTERPRETATION OF PHQ2 SCORE: 0

## 2019-05-15 NOTE — PROGRESS NOTES
CC: Follow-up chronic pain, hypertension.    HPI:   Nafisa presents today with the following.    1. Chronic prescription opiate use  Chronic Opiate therapy:  (5 A's)  Analgesia:  not changed  Activity:  not changed  Adverse Events:  none  Aberrant Behaviors:  none  Affect/Mood: normal thought patterns  Last dose medication today        2. Arthritis  She does have severe arthritis in her hands status post injuries.  She was recently started on Celebrex blood pressure unfortunately spiked to unacceptable levels while on medication.  She has been taking Norco since she was 60 before meeting me.  She is been quite compliant never going over her allotment of pills in a month she does report her pain is worse with activity which she is still trying to garden and do multiple other physical activities.    3. Chronic right shoulder pain  Does have a torn rotator cuff on the left status post surgery with decreased movement also sore pain for her.  Again she takes 3 pills/day on average.    4. Essential hypertension  Blood pressure well controlled now that off anti-inflammatories.      Patient Active Problem List    Diagnosis Date Noted   • Dyslipidemia 11/15/2018   • Essential hypertension 02/23/2017   • Right shoulder pain 02/16/2017   • Chronic prescription opiate use 02/18/2015   • Vitamin d deficiency 09/17/2012   • Arthritis 05/24/2011   • Osteoporosis, senile 06/18/2010       Current Outpatient Prescriptions   Medication Sig Dispense Refill   • Diclofenac Sodium (VOLTAREN) 1 % Gel Apply 1 Inch to skin as directed 4 times a day. 5 Tube 6   • [START ON 8/1/2019] HYDROcodone/acetaminophen (NORCO)  MG Tab Take 1 Tab by mouth every 8 hours as needed for up to 30 days. 90 Tab 0   • lisinopril (PRINIVIL) 20 MG Tab Take 1 Tab by mouth every day. 90 Tab 3   • Calcium Carbonate-Vitamin D (CALCIUM + D PO) Take 1,200 mg by mouth every day.     • Multiple Vitamin (MULTIVITAMINS PO) Take  by mouth every day.     •  "Cyanocobalamin (VITAMIN B-12 PO) Take 500 mcg by mouth every day.       No current facility-administered medications for this visit.          Allergies as of 05/15/2019 - Reviewed 05/15/2019   Allergen Reaction Noted   • Nkda [no known drug allergy]  09/22/2009        ROS: Denies Chest pain, SOB, LE edema.    /60 (BP Location: Right arm, Patient Position: Sitting)   Pulse 83   Temp 36.1 °C (97 °F)   Ht 1.651 m (5' 5\")   Wt 66.7 kg (147 lb)   SpO2 96%   BMI 24.46 kg/m²     Physical Exam:  Gen:         Alert and oriented, No apparent distress.  Neck:        No Lymphadenopathy or Bruits.  Lungs:     Clear to auscultation bilaterally  CV:          Regular rate and rhythm. No murmurs, rubs or gallops.               Ext:          No clubbing, cyanosis, edema.      Assessment and Plan.   78 y.o. female with the following issues.    1. Chronic prescription opiate use  Discussions today however will no longer prescribing chronic controlled substances.  She has been very reliable and is amenable to other agents to try and control her pain therefore placing referral to pain specialist.  Again most of her pain is hands and shoulder which is post surgery and still painful.  She will discuss all pain therapies with her new specialist.  - REFERRAL TO PAIN CLINIC    2. Arthritis  Again referral to pain specialist giving a trial of topical anti-inflammatories as she cannot tolerate orals because of blood pressure.  Refilled medications for the next 3 months we'll followup at that time. Pain is currently stable without new symptomology.  Controlled substance report reviewed and medications were deemed to be medically needed.  - REFERRAL TO PAIN CLINIC  - HYDROcodone/acetaminophen (NORCO)  MG Tab; Take 1 Tab by mouth every 8 hours as needed for up to 30 days.  Dispense: 90 Tab; Refill: 0    3. Chronic right shoulder pain  Again topical anti-inflammatories and refill pain medications  - Diclofenac Sodium (VOLTAREN) 1 " % Gel; Apply 1 Inch to skin as directed 4 times a day.  Dispense: 5 Tube; Refill: 6  - REFERRAL TO PAIN CLINIC    4. Essential hypertension  Currently well controlled, Discuss diet, exercise and salt restriction.  No change to medication therapy.

## 2019-06-27 RX ORDER — LISINOPRIL 20 MG/1
TABLET ORAL
Qty: 90 TAB | Refills: 3 | Status: SHIPPED | OUTPATIENT
Start: 2019-06-27 | End: 2019-08-22

## 2019-08-15 ENCOUNTER — APPOINTMENT (OUTPATIENT)
Dept: MEDICAL GROUP | Facility: MEDICAL CENTER | Age: 79
End: 2019-08-15
Payer: MEDICARE

## 2019-08-21 PROBLEM — F11.20 OPIATE DEPENDENCE (HCC): Status: ACTIVE | Noted: 2019-08-21

## 2019-08-21 RX ORDER — ESTRADIOL 0.5 MG/1
TABLET ORAL
COMMUNITY
Start: 2019-08-13 | End: 2019-09-19

## 2019-08-22 ENCOUNTER — OFFICE VISIT (OUTPATIENT)
Dept: MEDICAL GROUP | Facility: MEDICAL CENTER | Age: 79
End: 2019-08-22
Payer: MEDICARE

## 2019-08-22 VITALS
SYSTOLIC BLOOD PRESSURE: 128 MMHG | DIASTOLIC BLOOD PRESSURE: 72 MMHG | OXYGEN SATURATION: 97 % | TEMPERATURE: 97.8 F | HEART RATE: 76 BPM | HEIGHT: 65 IN | WEIGHT: 142 LBS | BODY MASS INDEX: 23.66 KG/M2

## 2019-08-22 DIAGNOSIS — I10 ESSENTIAL HYPERTENSION: ICD-10-CM

## 2019-08-22 DIAGNOSIS — E78.5 DYSLIPIDEMIA: ICD-10-CM

## 2019-08-22 DIAGNOSIS — Z78.0 POST-MENOPAUSAL: ICD-10-CM

## 2019-08-22 DIAGNOSIS — F11.20 UNCOMPLICATED OPIOID DEPENDENCE (HCC): ICD-10-CM

## 2019-08-22 PROCEDURE — 99213 OFFICE O/P EST LOW 20 MIN: CPT | Performed by: INTERNAL MEDICINE

## 2019-08-22 RX ORDER — LISINOPRIL 20 MG/1
20 TABLET ORAL DAILY
Qty: 100 TAB | Refills: 3 | Status: SHIPPED | OUTPATIENT
Start: 2019-08-22 | End: 2020-02-26

## 2019-08-22 NOTE — PROGRESS NOTES
"      CC: Lipidemia, chronic pain.                                                                                                                                      HPI:   Nafisa presents today with the following.    1. Dyslipidemia  Presents elevated cholesterol in the past she is coming due for repeat blood work in the coming months.  Currently managed by diet and exercise.    2. Uncomplicated opioid dependence (HCC)  She has transition to the pain specialist receiving pressure them denying any changes to pain syndrome.  She has no new symptoms of pain.          Patient Active Problem List    Diagnosis Date Noted   • Opiate dependence (HCC) 08/21/2019   • Dyslipidemia 11/15/2018   • Essential hypertension 02/23/2017   • Right shoulder pain 02/16/2017   • Vitamin d deficiency 09/17/2012   • Arthritis 05/24/2011   • Osteoporosis, senile 06/18/2010       Current Outpatient Medications   Medication Sig Dispense Refill   • lisinopril (PRINIVIL) 20 MG Tab Take 1 Tab by mouth every day. 100 Tab 3   • estradiol (ESTRACE) 0.5 MG tablet      • Diclofenac Sodium (VOLTAREN) 1 % Gel Apply 1 Inch to skin as directed 4 times a day. 5 Tube 6   • HYDROcodone/acetaminophen (NORCO)  MG Tab Take 1 Tab by mouth every 8 hours as needed for up to 30 days. 90 Tab 0   • Calcium Carbonate-Vitamin D (CALCIUM + D PO) Take 1,200 mg by mouth every day.     • Multiple Vitamin (MULTIVITAMINS PO) Take  by mouth every day.     • Cyanocobalamin (VITAMIN B-12 PO) Take 500 mcg by mouth every day.       No current facility-administered medications for this visit.          Allergies as of 08/22/2019 - Reviewed 08/22/2019   Allergen Reaction Noted   • Nkda [no known drug allergy]  09/22/2009        ROS: Denies Chest pain, SOB, LE edema.    /72 (BP Location: Right arm, Patient Position: Sitting)   Pulse 76   Temp 36.6 °C (97.8 °F)   Ht 1.651 m (5' 5\")   Wt 64.4 kg (142 lb)   SpO2 97%   BMI 23.63 kg/m²     Physical Exam:  Gen:         " Alert and oriented, No apparent distress.  Neck:        No Lymphadenopathy or Bruits.  Lungs:     Clear to auscultation bilaterally  CV:          Regular rate and rhythm. No murmurs, rubs or gallops.               Ext:          No clubbing, cyanosis, edema.      Assessment and Plan.   78 y.o. female with the following issues.    1. Dyslipidemia  Recheck cholesterol discussed diet exercise refill blood pressure medications.  - Comp Metabolic Panel; Future  - Lipid Profile; Future    2. Uncomplicated opioid dependence (HCC)  Follow along with pain specialist

## 2019-09-13 ENCOUNTER — HOSPITAL ENCOUNTER (OUTPATIENT)
Dept: RADIOLOGY | Facility: MEDICAL CENTER | Age: 79
End: 2019-09-13
Attending: INTERNAL MEDICINE
Payer: MEDICARE

## 2019-09-13 DIAGNOSIS — Z12.31 SCREENING MAMMOGRAM, ENCOUNTER FOR: ICD-10-CM

## 2019-09-13 DIAGNOSIS — Z78.0 POST-MENOPAUSAL: ICD-10-CM

## 2019-09-13 PROCEDURE — 77080 DXA BONE DENSITY AXIAL: CPT

## 2019-09-13 PROCEDURE — 77063 BREAST TOMOSYNTHESIS BI: CPT

## 2019-09-19 ENCOUNTER — OFFICE VISIT (OUTPATIENT)
Dept: MEDICAL GROUP | Facility: MEDICAL CENTER | Age: 79
End: 2019-09-19
Payer: MEDICARE

## 2019-09-19 VITALS
RESPIRATION RATE: 12 BRPM | DIASTOLIC BLOOD PRESSURE: 68 MMHG | SYSTOLIC BLOOD PRESSURE: 128 MMHG | BODY MASS INDEX: 24.32 KG/M2 | HEIGHT: 65 IN | OXYGEN SATURATION: 96 % | HEART RATE: 56 BPM | WEIGHT: 146 LBS

## 2019-09-19 DIAGNOSIS — M81.0 OSTEOPOROSIS, SENILE: Chronic | ICD-10-CM

## 2019-09-19 PROCEDURE — 99213 OFFICE O/P EST LOW 20 MIN: CPT | Performed by: INTERNAL MEDICINE

## 2019-09-19 NOTE — PROGRESS NOTES
"      CC: Follow-up bone density test.                                                                                                                                      HPI:   Nafisa presents today with the following.    1. Osteoporosis, senile  Presents after having bone density showing a 4% risk of fracture in the hip.  She is maintained on instructions she is interested in preserving bones.  She did try Fosamax several years ago did not tolerate.  She is willing to explore alternative avenues.  She did not tolerate Fosamax because of stomach upset.      Patient Active Problem List    Diagnosis Date Noted   • Opiate dependence (HCC) 08/21/2019   • Dyslipidemia 11/15/2018   • Essential hypertension 02/23/2017   • Right shoulder pain 02/16/2017   • Vitamin d deficiency 09/17/2012   • Arthritis 05/24/2011   • Osteoporosis, senile 06/18/2010       Current Outpatient Medications   Medication Sig Dispense Refill   • denosumab (PROLIA) 60 MG/ML Solution Prefilled Syringe Inject 1 mL as instructed Once for 1 dose. Route sub cutaneous. 11am 1 mL 6   • lisinopril (PRINIVIL) 20 MG Tab Take 1 Tab by mouth every day. 100 Tab 3   • Diclofenac Sodium (VOLTAREN) 1 % Gel Apply 1 Inch to skin as directed 4 times a day. 5 Tube 6   • Calcium Carbonate-Vitamin D (CALCIUM + D PO) Take 1,200 mg by mouth every day.     • Multiple Vitamin (MULTIVITAMINS PO) Take  by mouth every day.     • Cyanocobalamin (VITAMIN B-12 PO) Take 500 mcg by mouth every day.       No current facility-administered medications for this visit.          Allergies as of 09/19/2019 - Reviewed 08/22/2019   Allergen Reaction Noted   • Nkda [no known drug allergy]  09/22/2009        ROS: Denies Chest pain, SOB, LE edema.    /68   Pulse (!) 56   Resp 12   Ht 1.651 m (5' 5\")   Wt 66.2 kg (146 lb)   SpO2 96%   BMI 24.30 kg/m²     Physical Exam:  Gen:         Alert and oriented, No apparent distress.  Neck:        No Lymphadenopathy or Bruits.  Lungs:     " Clear to auscultation bilaterally  CV:          Regular rate and rhythm. No murmurs, rubs or gallops.               Ext:          No clubbing, cyanosis, edema.      Assessment and Plan.   79 y.o. female with the following issues.    1. Osteoporosis, senile  Starting on Prolia sending prescription to infusion center.

## 2019-09-21 ENCOUNTER — HOSPITAL ENCOUNTER (OUTPATIENT)
Dept: LAB | Facility: MEDICAL CENTER | Age: 79
End: 2019-09-21
Attending: INTERNAL MEDICINE
Payer: MEDICARE

## 2019-09-21 DIAGNOSIS — E78.5 DYSLIPIDEMIA: ICD-10-CM

## 2019-09-21 LAB
ALBUMIN SERPL BCP-MCNC: 4.8 G/DL (ref 3.2–4.9)
ALBUMIN/GLOB SERPL: 1.7 G/DL
ALP SERPL-CCNC: 62 U/L (ref 30–99)
ALT SERPL-CCNC: 18 U/L (ref 2–50)
ANION GAP SERPL CALC-SCNC: 8 MMOL/L (ref 0–11.9)
AST SERPL-CCNC: 21 U/L (ref 12–45)
BILIRUB SERPL-MCNC: 0.4 MG/DL (ref 0.1–1.5)
BUN SERPL-MCNC: 19 MG/DL (ref 8–22)
CALCIUM SERPL-MCNC: 9.8 MG/DL (ref 8.5–10.5)
CHLORIDE SERPL-SCNC: 104 MMOL/L (ref 96–112)
CHOLEST SERPL-MCNC: 250 MG/DL (ref 100–199)
CO2 SERPL-SCNC: 26 MMOL/L (ref 20–33)
CREAT SERPL-MCNC: 0.88 MG/DL (ref 0.5–1.4)
FASTING STATUS PATIENT QL REPORTED: NORMAL
GLOBULIN SER CALC-MCNC: 2.9 G/DL (ref 1.9–3.5)
GLUCOSE SERPL-MCNC: 86 MG/DL (ref 65–99)
HDLC SERPL-MCNC: 84 MG/DL
LDLC SERPL CALC-MCNC: 133 MG/DL
POTASSIUM SERPL-SCNC: 4.3 MMOL/L (ref 3.6–5.5)
PROT SERPL-MCNC: 7.7 G/DL (ref 6–8.2)
SODIUM SERPL-SCNC: 138 MMOL/L (ref 135–145)
TRIGL SERPL-MCNC: 164 MG/DL (ref 0–149)

## 2019-09-21 PROCEDURE — 80053 COMPREHEN METABOLIC PANEL: CPT

## 2019-09-21 PROCEDURE — 80061 LIPID PANEL: CPT

## 2019-09-21 PROCEDURE — 36415 COLL VENOUS BLD VENIPUNCTURE: CPT

## 2019-09-23 ENCOUNTER — TELEPHONE (OUTPATIENT)
Dept: MEDICAL GROUP | Facility: MEDICAL CENTER | Age: 79
End: 2019-09-23

## 2019-09-25 ENCOUNTER — NON-PROVIDER VISIT (OUTPATIENT)
Dept: MEDICAL GROUP | Facility: MEDICAL CENTER | Age: 79
End: 2019-09-25
Payer: MEDICARE

## 2019-09-25 DIAGNOSIS — M81.0 OSTEOPOROSIS, SENILE: Chronic | ICD-10-CM

## 2019-09-25 PROCEDURE — 96372 THER/PROPH/DIAG INJ SC/IM: CPT | Performed by: INTERNAL MEDICINE

## 2019-09-25 NOTE — PROGRESS NOTES
Nafisa Hancock is a 79 y.o. female here for a non-provider visit for Prolia injection.    Reason for injection: Osteoporosis  Order in MAR?: Yes  Patient supplied?:Yes  Minimum interval has been met for this injection (per MAR order): Yes    Order and dose verified by: Dr. Reyes  Patient tolerated injection and no adverse effects were observed or reported: Yes    # of Administrations remaining in MAR: 0    Next Prolia Due: 3/25/2020

## 2020-02-19 DIAGNOSIS — Z01.810 PRE-OPERATIVE CARDIOVASCULAR EXAMINATION: ICD-10-CM

## 2020-02-19 DIAGNOSIS — Z01.812 PRE-OPERATIVE LABORATORY EXAMINATION: ICD-10-CM

## 2020-02-19 LAB
ANION GAP SERPL CALC-SCNC: 8 MMOL/L (ref 0–11.9)
BUN SERPL-MCNC: 18 MG/DL (ref 8–22)
CALCIUM SERPL-MCNC: 10.2 MG/DL (ref 8.5–10.5)
CHLORIDE SERPL-SCNC: 101 MMOL/L (ref 96–112)
CO2 SERPL-SCNC: 24 MMOL/L (ref 20–33)
CREAT SERPL-MCNC: 0.88 MG/DL (ref 0.5–1.4)
EKG IMPRESSION: NORMAL
GLUCOSE SERPL-MCNC: 105 MG/DL (ref 65–99)
POTASSIUM SERPL-SCNC: 4.3 MMOL/L (ref 3.6–5.5)
SODIUM SERPL-SCNC: 133 MMOL/L (ref 135–145)

## 2020-02-19 PROCEDURE — 93005 ELECTROCARDIOGRAM TRACING: CPT

## 2020-02-19 PROCEDURE — 93010 ELECTROCARDIOGRAM REPORT: CPT | Performed by: INTERNAL MEDICINE

## 2020-02-19 PROCEDURE — 80048 BASIC METABOLIC PNL TOTAL CA: CPT

## 2020-02-19 PROCEDURE — 36415 COLL VENOUS BLD VENIPUNCTURE: CPT

## 2020-02-25 ENCOUNTER — HOSPITAL ENCOUNTER (OUTPATIENT)
Facility: MEDICAL CENTER | Age: 80
End: 2020-02-25
Attending: OBSTETRICS & GYNECOLOGY | Admitting: OBSTETRICS & GYNECOLOGY
Payer: MEDICARE

## 2020-02-25 ENCOUNTER — PATIENT OUTREACH (OUTPATIENT)
Dept: HEALTH INFORMATION MANAGEMENT | Facility: OTHER | Age: 80
End: 2020-02-25

## 2020-02-25 VITALS
HEART RATE: 85 BPM | DIASTOLIC BLOOD PRESSURE: 98 MMHG | TEMPERATURE: 98.7 F | OXYGEN SATURATION: 94 % | RESPIRATION RATE: 16 BRPM | SYSTOLIC BLOOD PRESSURE: 243 MMHG | WEIGHT: 145.5 LBS | BODY MASS INDEX: 24.24 KG/M2 | HEIGHT: 65 IN

## 2020-02-25 PROCEDURE — 700111 HCHG RX REV CODE 636 W/ 250 OVERRIDE (IP): Performed by: ANESTHESIOLOGY

## 2020-02-25 PROCEDURE — 700105 HCHG RX REV CODE 258: Performed by: OBSTETRICS & GYNECOLOGY

## 2020-02-25 RX ORDER — HYDRALAZINE HYDROCHLORIDE 20 MG/ML
10 INJECTION INTRAMUSCULAR; INTRAVENOUS ONCE
Status: COMPLETED | OUTPATIENT
Start: 2020-02-25 | End: 2020-02-25

## 2020-02-25 RX ORDER — SODIUM CHLORIDE, SODIUM LACTATE, POTASSIUM CHLORIDE, CALCIUM CHLORIDE 600; 310; 30; 20 MG/100ML; MG/100ML; MG/100ML; MG/100ML
INJECTION, SOLUTION INTRAVENOUS CONTINUOUS
Status: DISCONTINUED | OUTPATIENT
Start: 2020-02-25 | End: 2020-02-25 | Stop reason: HOSPADM

## 2020-02-25 RX ORDER — HYDROCODONE BITARTRATE AND ACETAMINOPHEN 10; 325 MG/1; MG/1
0.5 TABLET ORAL 2 TIMES DAILY PRN
Status: ON HOLD | COMMUNITY
End: 2021-02-22

## 2020-02-25 RX ADMIN — SODIUM CHLORIDE, POTASSIUM CHLORIDE, SODIUM LACTATE AND CALCIUM CHLORIDE: 600; 310; 30; 20 INJECTION, SOLUTION INTRAVENOUS at 11:45

## 2020-02-25 RX ADMIN — HYDRALAZINE HYDROCHLORIDE 10 MG: 20 INJECTION INTRAMUSCULAR; INTRAVENOUS at 12:25

## 2020-02-25 NOTE — OR NURSING
[id number]  called and said that this case will be canceled due to her uncontrolled HTN.  Or staff will update Dr. Lucero.  Pt was encouraged to go to emergency room.   Patient refused to go to emergency room and stated that she will call her PCP Dr. James Reyes as soon as she gets home.      Dr. Pérez stated to keep patient in pre op until 13:30 then ok to dc home.    Rn will monitor Bp prior to patent going home.

## 2020-02-25 NOTE — OR NURSING
[id number] Dr. Pérez updated regarding patients BP.  He directed me to call Dr. Santana saying that she will be taking care of this patient.

## 2020-02-25 NOTE — PROGRESS NOTES
1. [facility] Verified: yes    2. Verify PCP: yes    3. Review and add  to Care Team: yes    Pt declined to verify more info and requested info by mail and hung up.

## 2020-02-25 NOTE — H&P
HISTORY OF PRESENT ILLNESS:  A 79-year-old woman with postmenopausal vaginal   bleeding.  She is on hormone therapy.  It is unclear if she is compliant with   her progesterone.  Apparently in 2019, she reported that she attempted to   go off of her hormone therapy and had a lot of vasomotor symptoms and resumed   the estrogen, but was unsure if she resumed progesterone.    PAST MEDICAL HISTORY:  1.  Hypertension.  2.  Osteoarthritis.  3.  Osteoporosis.    PAST SURGICAL HISTORY:  1.  In , left rotator cuff.  2.  In 2017, right rotator cuff.    OBSTETRIC HISTORY:  .  Pap test was normal in 2019.    SOCIAL HISTORY:  She is a nonsmoker.  Denies alcohol abuse.    REVIEW OF SYSTEMS:  Positive for postmenopausal bleeding and is otherwise   negative for 10 review of systems.    FAMILY HISTORY:  Noncontributory.    PHYSICAL EXAMINATION:  VITAL SIGNS:  Weight 142 pounds, height 65 inches, BMI is 24, blood pressure   is 140/78.  GENERAL:  Well nourished, well developed.  CARDIOVASCULAR:  RRR.  PULMONARY:  CTAB.  ABDOMEN:  Soft and nontender.  PELVIC:  External genitalia normal.  Vagina atrophic.  Cervix is normal.    Uterus, normal size and position.  Adnexa, no masses.    ASSESSMENT AND PLAN:  A 79-year-old woman with postmenopausal bleeding.    Apparently, she attempted to go off of her hormone therapy and had significant   vasomotor changes and may have just resumed her estrogen for an unknown   period of time and did not resume progesterone.  She was counseled extensively   on the need to use progesterone anytime she is taking estrogen.  She   understands this.  A transvaginal ultrasound was performed, which showed a   thickened endometrium of 13 mm and an EMB was performed, which was   inconclusive, but showed a small focus of clear cell change, though cannot   exclude clear cell adenocarcinoma.  The plan is for hysteroscopy, D and C for   improved endometrial sample and diagnosis.        ____________________________________     MD ERIN David / TETE    DD:  02/25/2020 08:42:20  DT:  02/25/2020 09:08:18    D#:  [id number]  Job#:  [id number]

## 2020-02-26 ENCOUNTER — OFFICE VISIT (OUTPATIENT)
Dept: MEDICAL GROUP | Facility: MEDICAL CENTER | Age: 80
End: 2020-02-26
Payer: MEDICARE

## 2020-02-26 VITALS
SYSTOLIC BLOOD PRESSURE: 176 MMHG | DIASTOLIC BLOOD PRESSURE: 82 MMHG | HEIGHT: 65 IN | BODY MASS INDEX: 24.32 KG/M2 | OXYGEN SATURATION: 95 % | WEIGHT: 146 LBS | TEMPERATURE: 98 F | HEART RATE: 103 BPM

## 2020-02-26 DIAGNOSIS — Z00.00 MEDICARE ANNUAL WELLNESS VISIT, SUBSEQUENT: ICD-10-CM

## 2020-02-26 DIAGNOSIS — I10 ESSENTIAL HYPERTENSION: ICD-10-CM

## 2020-02-26 DIAGNOSIS — F11.20 UNCOMPLICATED OPIOID DEPENDENCE (HCC): ICD-10-CM

## 2020-02-26 DIAGNOSIS — E78.5 DYSLIPIDEMIA: ICD-10-CM

## 2020-02-26 DIAGNOSIS — M81.0 OSTEOPOROSIS, SENILE: Chronic | ICD-10-CM

## 2020-02-26 DIAGNOSIS — M19.90 ARTHRITIS: ICD-10-CM

## 2020-02-26 PROCEDURE — 99213 OFFICE O/P EST LOW 20 MIN: CPT | Mod: 25 | Performed by: INTERNAL MEDICINE

## 2020-02-26 PROCEDURE — 8041 PR SCP AHA: Performed by: INTERNAL MEDICINE

## 2020-02-26 PROCEDURE — G0439 PPPS, SUBSEQ VISIT: HCPCS | Performed by: INTERNAL MEDICINE

## 2020-02-26 RX ORDER — LISINOPRIL 40 MG/1
40 TABLET ORAL DAILY
Qty: 100 TAB | Refills: 3 | Status: SHIPPED | OUTPATIENT
Start: 2020-02-26 | End: 2020-04-20

## 2020-02-26 ASSESSMENT — ENCOUNTER SYMPTOMS: GENERAL WELL-BEING: GOOD

## 2020-02-26 ASSESSMENT — PATIENT HEALTH QUESTIONNAIRE - PHQ9: CLINICAL INTERPRETATION OF PHQ2 SCORE: 0

## 2020-02-26 ASSESSMENT — ACTIVITIES OF DAILY LIVING (ADL): BATHING_REQUIRES_ASSISTANCE: 0

## 2020-02-26 NOTE — PROGRESS NOTES
Annual Health Assessment Questions:    1.  Are you currently engaging in any exercise or physical activity? Yes    2.  How would you describe your mood or emotional well-being today? good    3.  Have you had any falls in the last year? No    4.  Have you noticed any problems with your balance or had difficulty walking? No    5.  In the last six months have you experienced any leakage of urine? No    6. DPA/Advanced Directive: Patient has Advanced Directive, but it is not on file. Instructed to bring in a copy to scan into their chart.             CC: Follow-up hypertension due for wellness examination.                                                                                                                                      HPI:   Nafisa presents today with the following.    1. Medicare annual wellness visit, subsequent  Screenings performed below information given on advanced directives    2. Essential hypertension  Presents after having a surgery postponed yesterday because of elevated blood pressure.  She is compliant with lisinopril 40 mg reports readings better than she had today in office.  She denies any chest pain or shortness of breath no dizziness from medication.  Kidney function is in a normal range.  Denying taking any NSAIDs she reports she is eating more salt than she should.      Depression Screening    Little interest or pleasure in doing things?  0 - not at all  Feeling down, depressed , or hopeless? 0 - not at all  Patient Health Questionnaire Score: 0     If depressive symptoms identified deferred to follow up visit unless specifically addressed in assessment and plan.    Interpretation of PHQ-9 Total Score   Score Severity   1-4 No Depression   5-9 Mild Depression   10-14 Moderate Depression   15-19 Moderately Severe Depression   20-27 Severe Depression    Screening for Cognitive Impairment    Three Minute Recall (village, kitchen, baby) 2/3    Albert clock face with all 12 numbers and  set the hands to show 10 past 10.  Yes 5/5  Cognitive concerns identified deferred for follow up unless specifically addressed in assessment and plan.    Fall Risk Assessment    Has the patient had two or more falls in the last year or any fall with injury in the last year?  No    Safety Assessment    Throw rugs on floor.  Yes  Handrails on all stairs.  Yes  Good lighting in all hallways.  Yes  Difficulty hearing.  No  Patient counseled about all safety risks that were identified.    Functional Assessment ADLs    Are there any barriers preventing you from cooking for yourself or meeting nutritional needs?  No.    Are there any barriers preventing you from driving safely or obtaining transportation?  No.    Are there any barriers preventing you from using a telephone or calling for help?  No.    Are there any barriers preventing you from shopping?  No.    Are there any barriers preventing you from taking care of your own finances?  No.    Are there any barriers preventing you from managing your medications?  No.    Are there any barriers preventing you from showering, bathing or dressing yourself?  No.    Are you currently engaging in any exercise or physical activity?  Yes.     What is your perception of your health?  Good.      Health Maintenance Summary                Annual Wellness Visit Overdue 11/16/2019      Done 11/15/2018 Visit Dx: Medicare annual wellness visit, subsequent     Patient has more history with this topic...    IMM ZOSTER VACCINES Postponed 11/13/2035 Originally 4/19/2016. Insurance/Financial     Done 2/23/2016 Imm Admin: Zoster Vaccine Live (ZVL) (Zostavax)    MAMMOGRAM Next Due 9/13/2020      Done 9/13/2019 MA-SCREENING MAMMO BILAT W/TOMOSYNTHESIS W/CAD     Patient has more history with this topic...    IMM DTaP/Tdap/Td Vaccine Next Due 5/10/2022      Done 5/10/2012 Imm Admin: Tdap Vaccine    COLONOSCOPY Next Due 6/15/2022      Done 6/15/2012      Patient has more history with this topic...  "   BONE DENSITY Next Due 9/13/2024      Done 9/13/2019 DS-BONE DENSITY STUDY (DEXA)     Patient has more history with this topic...          Patient Care Team:  James Reyes M.D. as PCP - General  Artemio Galindo R.N. as Medical Home Care Manager ([facility])  Kaya Ingram as      Patient Active Problem List    Diagnosis Date Noted   • Opiate dependence (HCC) 08/21/2019   • Dyslipidemia 11/15/2018   • Essential hypertension 02/23/2017   • Right shoulder pain 02/16/2017   • Arthritis 05/24/2011   • Osteoporosis, senile 06/18/2010       Current Outpatient Medications   Medication Sig Dispense Refill   • lisinopril (PRINIVIL) 40 MG tablet Take 1 Tab by mouth every day. 100 Tab 3   • HYDROcodone/acetaminophen (NORCO)  MG Tab Take 1-2 Tabs by mouth every 6 hours as needed.     • Calcium Carbonate-Vitamin D (CALCIUM + D PO) Take 1,200 mg by mouth every day.     • Multiple Vitamin (MULTIVITAMINS PO) Take  by mouth every day.     • Cyanocobalamin (VITAMIN B-12 PO) Take 500 mcg by mouth every day.     • Diclofenac Sodium (VOLTAREN) 1 % Gel Apply 1 Inch to skin as directed 4 times a day. (Patient not taking: Reported on 2/19/2020) 5 Tube 6     No current facility-administered medications for this visit.          Allergies as of 02/26/2020 - Reviewed 02/26/2020   Allergen Reaction Noted   • Nkda [no known drug allergy]  09/22/2009        ROS: Denies Chest pain, SOB, LE edema.    BP (!) 176/82 (BP Location: Right arm, Patient Position: Sitting)   Pulse (!) 103   Temp 36.7 °C (98 °F)   Ht 1.651 m (5' 5\")   Wt 66.2 kg (146 lb)   LMP 01/13/2017 (Exact Date) Comment: Due to hormone therapy to prevent cancer  SpO2 95%   BMI 24.30 kg/m²     Physical Exam:  Gen:         Alert and oriented, No apparent distress.  Neck:        No Lymphadenopathy or Bruits.  Lungs:     Clear to auscultation bilaterally  CV:          Regular rate and rhythm. No murmurs, rubs or gallops.         "       Ext:          No clubbing, cyanosis, edema.      Assessment and Plan.   79 y.o. female with the following issues.    1. Medicare annual wellness visit, subsequent  Discussed healthy lifestyle habits as well as screening regimens.  Discussion about safe lifestyle practices, seatbelts, sunscreen, dietary recommendations.  - Subsequent Annual Wellness Visit - Includes PPPS ()    2. Essential hypertension  Have increase lisinopril to 40 mg will see back in 1 week with blood pressure log likely adding on low-dose amlodipine if still elevated.    3. Uncomplicated opioid dependence (HCC)  Followed by pain specialist.  - Subsequent Annual Wellness Visit - Includes PPPS ()    4. Arthritis  Reports under fair control again by pain specialist  - Subsequent Annual Wellness Visit - Includes PPPS ()    5. Dyslipidemia  Rechecking cholesterol in 6 months  - Comp Metabolic Panel; Future  - Lipid Profile; Future  - Subsequent Annual Wellness Visit - Includes PPPS ()    6. Osteoporosis, senile  Did not tolerate Prolia she reports a rash 2 months after the injection she does not want to take further.  - Subsequent Annual Wellness Visit - Includes PPPS ()

## 2020-03-03 ENCOUNTER — NON-PROVIDER VISIT (OUTPATIENT)
Dept: MEDICAL GROUP | Facility: MEDICAL CENTER | Age: 80
End: 2020-03-03
Payer: MEDICARE

## 2020-03-03 VITALS — DIASTOLIC BLOOD PRESSURE: 60 MMHG | SYSTOLIC BLOOD PRESSURE: 158 MMHG

## 2020-03-03 RX ORDER — AMLODIPINE BESYLATE 5 MG/1
5 TABLET ORAL DAILY
Qty: 90 TAB | Refills: 3 | Status: SHIPPED | OUTPATIENT
Start: 2020-03-03 | End: 2020-03-10

## 2020-03-03 NOTE — PROGRESS NOTES
Nafisa Hancock is a 79 y.o. female here for a non-provider visit for BP Check    Vitals:    03/03/20 1400   BP: 158/60   BP Location: Right arm   Patient Position: Sitting     If abnormal, was an in office provider notified today? Yes  Routed to PCP? Yes Amlodipine sent to pharmacy and appointment made with Dr. Reyes for 1 week follow up.

## 2020-03-10 ENCOUNTER — OFFICE VISIT (OUTPATIENT)
Dept: MEDICAL GROUP | Facility: MEDICAL CENTER | Age: 80
End: 2020-03-10

## 2020-03-10 ENCOUNTER — APPOINTMENT (OUTPATIENT)
Dept: MEDICAL GROUP | Facility: MEDICAL CENTER | Age: 80
End: 2020-03-10
Payer: MEDICARE

## 2020-03-10 VITALS
BODY MASS INDEX: 24.32 KG/M2 | RESPIRATION RATE: 12 BRPM | WEIGHT: 146 LBS | SYSTOLIC BLOOD PRESSURE: 138 MMHG | OXYGEN SATURATION: 95 % | DIASTOLIC BLOOD PRESSURE: 76 MMHG | HEART RATE: 93 BPM | HEIGHT: 65 IN

## 2020-03-10 DIAGNOSIS — I10 ESSENTIAL HYPERTENSION: ICD-10-CM

## 2020-03-10 PROCEDURE — 99213 OFFICE O/P EST LOW 20 MIN: CPT | Performed by: INTERNAL MEDICINE

## 2020-03-10 RX ORDER — AMLODIPINE BESYLATE 10 MG/1
10 TABLET ORAL DAILY
Qty: 90 TAB | Refills: 3 | Status: SHIPPED | OUTPATIENT
Start: 2020-03-10 | End: 2020-04-20

## 2020-03-10 NOTE — PROGRESS NOTES
"      CC: Follow-up hypertension                                                                                                                                      HPI:   Nafisa presents today with the following.    1. Essential hypertension  Presents after being started on amlodipine 5 mg tolerating well no leg swelling or dizziness.  Blood pressure logs averaging in the mid 130s.  Does not have any severe spikes at this point.  She is tolerating well.      Patient Active Problem List    Diagnosis Date Noted   • Opiate dependence (HCC) 08/21/2019   • Dyslipidemia 11/15/2018   • Essential hypertension 02/23/2017   • Right shoulder pain 02/16/2017   • Arthritis 05/24/2011   • Osteoporosis, senile 06/18/2010       Current Outpatient Medications   Medication Sig Dispense Refill   • amLODIPine (NORVASC) 10 MG Tab Take 1 Tab by mouth every day. 90 Tab 3   • lisinopril (PRINIVIL) 40 MG tablet Take 1 Tab by mouth every day. 100 Tab 3   • HYDROcodone/acetaminophen (NORCO)  MG Tab Take 1-2 Tabs by mouth every 6 hours as needed.     • Diclofenac Sodium (VOLTAREN) 1 % Gel Apply 1 Inch to skin as directed 4 times a day. (Patient not taking: Reported on 2/19/2020) 5 Tube 6   • Calcium Carbonate-Vitamin D (CALCIUM + D PO) Take 1,200 mg by mouth every day.     • Multiple Vitamin (MULTIVITAMINS PO) Take  by mouth every day.     • Cyanocobalamin (VITAMIN B-12 PO) Take 500 mcg by mouth every day.       No current facility-administered medications for this visit.          Allergies as of 03/10/2020 - Reviewed 03/10/2020   Allergen Reaction Noted   • Nkda [no known drug allergy]  09/22/2009        ROS: Denies Chest pain, SOB, LE edema.    /76   Pulse 93   Resp 12   Ht 1.651 m (5' 5\")   Wt 66.2 kg (146 lb)   LMP 01/13/2017 (Exact Date) Comment: Due to hormone therapy to prevent cancer  SpO2 95%   BMI 24.30 kg/m²     Physical Exam:  Gen:         Alert and oriented, No apparent distress.  Neck:        No " Lymphadenopathy or Bruits.  Lungs:     Clear to auscultation bilaterally  CV:          Regular rate and rhythm. No murmurs, rubs or gallops.               Ext:          No clubbing, cyanosis, edema.      Assessment and Plan.   79 y.o. female with the following issues.    1. Essential hypertension  Given persistent elevation trying to get below 130  increased amlodipine to 10 mg cautioned about dizziness  - amLODIPine (NORVASC) 10 MG Tab; Take 1 Tab by mouth every day.  Dispense: 90 Tab; Refill: 3

## 2020-03-31 ENCOUNTER — TELEPHONE (OUTPATIENT)
Dept: MEDICAL GROUP | Facility: MEDICAL CENTER | Age: 80
End: 2020-03-31

## 2020-03-31 NOTE — LETTER
March 31, 2020        Nafisa Hancock      Patient is cleared for procedure.                           James Reyes M.D.

## 2020-04-19 NOTE — PROGRESS NOTES
COVID-19 Pre-surgery screenin. Do you have an undiagnosed respiratory illness or symptoms such as coughing or sneezing? *NO* (Yes/No)  a. Onset of Sx  b. Acute vs. chronic respiratory illness    2. Do you have an unexplained fever greater than 100.4 degrees Fahrenheit or 38 degrees Celsius?     *NO* (Yes/No)    3. Have you had direct exposure to a patient who tested positive for Covid-19?    *NO* (Yes/No)    4. Have you traveled within the last 14 days to [place], [place], [place], [place], or [place]?    *NO* (Yes/No)    Informed of visitor policy

## 2020-04-20 ENCOUNTER — ANESTHESIA (OUTPATIENT)
Dept: SURGERY | Facility: MEDICAL CENTER | Age: 80
End: 2020-04-20
Payer: MEDICARE

## 2020-04-20 ENCOUNTER — ANESTHESIA EVENT (OUTPATIENT)
Dept: SURGERY | Facility: MEDICAL CENTER | Age: 80
End: 2020-04-20
Payer: MEDICARE

## 2020-04-20 ENCOUNTER — HOSPITAL ENCOUNTER (OUTPATIENT)
Facility: MEDICAL CENTER | Age: 80
End: 2020-04-20
Attending: OBSTETRICS & GYNECOLOGY | Admitting: OBSTETRICS & GYNECOLOGY
Payer: MEDICARE

## 2020-04-20 VITALS
TEMPERATURE: 97.6 F | HEIGHT: 65 IN | WEIGHT: 143.3 LBS | HEART RATE: 71 BPM | RESPIRATION RATE: 18 BRPM | OXYGEN SATURATION: 99 % | BODY MASS INDEX: 23.88 KG/M2 | DIASTOLIC BLOOD PRESSURE: 69 MMHG | SYSTOLIC BLOOD PRESSURE: 159 MMHG

## 2020-04-20 LAB
ANION GAP SERPL CALC-SCNC: 17 MMOL/L (ref 7–16)
BUN SERPL-MCNC: 14 MG/DL (ref 8–22)
CALCIUM SERPL-MCNC: 9.5 MG/DL (ref 8.5–10.5)
CHLORIDE SERPL-SCNC: 99 MMOL/L (ref 96–112)
CO2 SERPL-SCNC: 21 MMOL/L (ref 20–33)
CREAT SERPL-MCNC: 0.83 MG/DL (ref 0.5–1.4)
GLUCOSE SERPL-MCNC: 102 MG/DL (ref 65–99)
PATHOLOGY CONSULT NOTE: NORMAL
POTASSIUM SERPL-SCNC: 4.2 MMOL/L (ref 3.6–5.5)
SODIUM SERPL-SCNC: 137 MMOL/L (ref 135–145)

## 2020-04-20 PROCEDURE — 700101 HCHG RX REV CODE 250: Performed by: ANESTHESIOLOGY

## 2020-04-20 PROCEDURE — 700111 HCHG RX REV CODE 636 W/ 250 OVERRIDE (IP): Performed by: OBSTETRICS & GYNECOLOGY

## 2020-04-20 PROCEDURE — 160028 HCHG SURGERY MINUTES - 1ST 30 MINS LEVEL 3: Performed by: OBSTETRICS & GYNECOLOGY

## 2020-04-20 PROCEDURE — 700111 HCHG RX REV CODE 636 W/ 250 OVERRIDE (IP): Performed by: ANESTHESIOLOGY

## 2020-04-20 PROCEDURE — 80048 BASIC METABOLIC PNL TOTAL CA: CPT

## 2020-04-20 PROCEDURE — 502587 HCHG PACK, D&C: Performed by: OBSTETRICS & GYNECOLOGY

## 2020-04-20 PROCEDURE — 160039 HCHG SURGERY MINUTES - EA ADDL 1 MIN LEVEL 3: Performed by: OBSTETRICS & GYNECOLOGY

## 2020-04-20 PROCEDURE — 160025 RECOVERY II MINUTES (STATS): Performed by: OBSTETRICS & GYNECOLOGY

## 2020-04-20 PROCEDURE — 700105 HCHG RX REV CODE 258: Performed by: OBSTETRICS & GYNECOLOGY

## 2020-04-20 PROCEDURE — 88305 TISSUE EXAM BY PATHOLOGIST: CPT

## 2020-04-20 PROCEDURE — 160035 HCHG PACU - 1ST 60 MINS PHASE I: Performed by: OBSTETRICS & GYNECOLOGY

## 2020-04-20 PROCEDURE — 160009 HCHG ANES TIME/MIN: Performed by: OBSTETRICS & GYNECOLOGY

## 2020-04-20 PROCEDURE — 160046 HCHG PACU - 1ST 60 MINS PHASE II: Performed by: OBSTETRICS & GYNECOLOGY

## 2020-04-20 PROCEDURE — 160048 HCHG OR STATISTICAL LEVEL 1-5: Performed by: OBSTETRICS & GYNECOLOGY

## 2020-04-20 PROCEDURE — 160002 HCHG RECOVERY MINUTES (STAT): Performed by: OBSTETRICS & GYNECOLOGY

## 2020-04-20 RX ORDER — LABETALOL HYDROCHLORIDE 5 MG/ML
5 INJECTION, SOLUTION INTRAVENOUS
Status: DISCONTINUED | OUTPATIENT
Start: 2020-04-20 | End: 2020-04-20 | Stop reason: HOSPADM

## 2020-04-20 RX ORDER — OXYCODONE HCL 5 MG/5 ML
5 SOLUTION, ORAL ORAL
Status: DISCONTINUED | OUTPATIENT
Start: 2020-04-20 | End: 2020-04-20 | Stop reason: HOSPADM

## 2020-04-20 RX ORDER — OXYCODONE HCL 5 MG/5 ML
10 SOLUTION, ORAL ORAL
Status: DISCONTINUED | OUTPATIENT
Start: 2020-04-20 | End: 2020-04-20 | Stop reason: HOSPADM

## 2020-04-20 RX ORDER — LISINOPRIL 20 MG/1
20 TABLET ORAL DAILY
COMMUNITY
End: 2020-05-29 | Stop reason: SDUPTHER

## 2020-04-20 RX ORDER — ONDANSETRON 2 MG/ML
4 INJECTION INTRAMUSCULAR; INTRAVENOUS
Status: DISCONTINUED | OUTPATIENT
Start: 2020-04-20 | End: 2020-04-20 | Stop reason: HOSPADM

## 2020-04-20 RX ORDER — HYDROMORPHONE HYDROCHLORIDE 1 MG/ML
0.4 INJECTION, SOLUTION INTRAMUSCULAR; INTRAVENOUS; SUBCUTANEOUS
Status: DISCONTINUED | OUTPATIENT
Start: 2020-04-20 | End: 2020-04-20 | Stop reason: HOSPADM

## 2020-04-20 RX ORDER — KETOROLAC TROMETHAMINE 30 MG/ML
INJECTION, SOLUTION INTRAMUSCULAR; INTRAVENOUS PRN
Status: DISCONTINUED | OUTPATIENT
Start: 2020-04-20 | End: 2020-04-20 | Stop reason: SURG

## 2020-04-20 RX ORDER — HYDROMORPHONE HYDROCHLORIDE 1 MG/ML
0.2 INJECTION, SOLUTION INTRAMUSCULAR; INTRAVENOUS; SUBCUTANEOUS
Status: DISCONTINUED | OUTPATIENT
Start: 2020-04-20 | End: 2020-04-20 | Stop reason: HOSPADM

## 2020-04-20 RX ORDER — HYDRALAZINE HYDROCHLORIDE 20 MG/ML
5 INJECTION INTRAMUSCULAR; INTRAVENOUS
Status: DISCONTINUED | OUTPATIENT
Start: 2020-04-20 | End: 2020-04-20 | Stop reason: HOSPADM

## 2020-04-20 RX ORDER — HYDROMORPHONE HYDROCHLORIDE 1 MG/ML
0.1 INJECTION, SOLUTION INTRAMUSCULAR; INTRAVENOUS; SUBCUTANEOUS
Status: DISCONTINUED | OUTPATIENT
Start: 2020-04-20 | End: 2020-04-20 | Stop reason: HOSPADM

## 2020-04-20 RX ORDER — LABETALOL HYDROCHLORIDE 5 MG/ML
INJECTION, SOLUTION INTRAVENOUS PRN
Status: DISCONTINUED | OUTPATIENT
Start: 2020-04-20 | End: 2020-04-20 | Stop reason: SURG

## 2020-04-20 RX ORDER — LISINOPRIL 40 MG/1
40 TABLET ORAL
Status: ON HOLD | COMMUNITY
End: 2020-04-20

## 2020-04-20 RX ORDER — AMLODIPINE BESYLATE 10 MG/1
10 TABLET ORAL EVERY EVENING
COMMUNITY
End: 2020-09-30

## 2020-04-20 RX ORDER — MEPERIDINE HYDROCHLORIDE 25 MG/ML
12.5 INJECTION INTRAMUSCULAR; INTRAVENOUS; SUBCUTANEOUS
Status: DISCONTINUED | OUTPATIENT
Start: 2020-04-20 | End: 2020-04-20 | Stop reason: HOSPADM

## 2020-04-20 RX ORDER — MIDAZOLAM HYDROCHLORIDE 1 MG/ML
1 INJECTION INTRAMUSCULAR; INTRAVENOUS
Status: DISCONTINUED | OUTPATIENT
Start: 2020-04-20 | End: 2020-04-20 | Stop reason: HOSPADM

## 2020-04-20 RX ORDER — ONDANSETRON 2 MG/ML
INJECTION INTRAMUSCULAR; INTRAVENOUS PRN
Status: DISCONTINUED | OUTPATIENT
Start: 2020-04-20 | End: 2020-04-20 | Stop reason: SURG

## 2020-04-20 RX ORDER — ROCURONIUM BROMIDE 10 MG/ML
INJECTION, SOLUTION INTRAVENOUS PRN
Status: DISCONTINUED | OUTPATIENT
Start: 2020-04-20 | End: 2020-04-20 | Stop reason: SURG

## 2020-04-20 RX ORDER — HALOPERIDOL 5 MG/ML
1 INJECTION INTRAMUSCULAR
Status: DISCONTINUED | OUTPATIENT
Start: 2020-04-20 | End: 2020-04-20 | Stop reason: HOSPADM

## 2020-04-20 RX ORDER — METOPROLOL TARTRATE 1 MG/ML
1 INJECTION, SOLUTION INTRAVENOUS
Status: DISCONTINUED | OUTPATIENT
Start: 2020-04-20 | End: 2020-04-20 | Stop reason: HOSPADM

## 2020-04-20 RX ORDER — LIDOCAINE HYDROCHLORIDE 20 MG/ML
INJECTION, SOLUTION EPIDURAL; INFILTRATION; INTRACAUDAL; PERINEURAL PRN
Status: DISCONTINUED | OUTPATIENT
Start: 2020-04-20 | End: 2020-04-20 | Stop reason: SURG

## 2020-04-20 RX ORDER — DEXAMETHASONE SODIUM PHOSPHATE 4 MG/ML
INJECTION, SOLUTION INTRA-ARTICULAR; INTRALESIONAL; INTRAMUSCULAR; INTRAVENOUS; SOFT TISSUE PRN
Status: DISCONTINUED | OUTPATIENT
Start: 2020-04-20 | End: 2020-04-20 | Stop reason: SURG

## 2020-04-20 RX ORDER — DIPHENHYDRAMINE HYDROCHLORIDE 50 MG/ML
12.5 INJECTION INTRAMUSCULAR; INTRAVENOUS
Status: DISCONTINUED | OUTPATIENT
Start: 2020-04-20 | End: 2020-04-20 | Stop reason: HOSPADM

## 2020-04-20 RX ORDER — ACETAMINOPHEN 500 MG
1000 TABLET ORAL ONCE
Status: DISCONTINUED | OUTPATIENT
Start: 2020-04-20 | End: 2020-04-20 | Stop reason: HOSPADM

## 2020-04-20 RX ORDER — ACETAMINOPHEN 500 MG
500-1000 TABLET ORAL EVERY 6 HOURS PRN
COMMUNITY
End: 2021-02-17

## 2020-04-20 RX ORDER — SODIUM CHLORIDE, SODIUM LACTATE, POTASSIUM CHLORIDE, CALCIUM CHLORIDE 600; 310; 30; 20 MG/100ML; MG/100ML; MG/100ML; MG/100ML
INJECTION, SOLUTION INTRAVENOUS CONTINUOUS
Status: DISCONTINUED | OUTPATIENT
Start: 2020-04-20 | End: 2020-04-20 | Stop reason: HOSPADM

## 2020-04-20 RX ORDER — BUPIVACAINE HYDROCHLORIDE 2.5 MG/ML
INJECTION, SOLUTION EPIDURAL; INFILTRATION; INTRACAUDAL
Status: DISCONTINUED | OUTPATIENT
Start: 2020-04-20 | End: 2020-04-20 | Stop reason: HOSPADM

## 2020-04-20 RX ADMIN — SUGAMMADEX 200 MG: 100 INJECTION, SOLUTION INTRAVENOUS at 15:05

## 2020-04-20 RX ADMIN — ONDANSETRON 4 MG: 2 INJECTION INTRAMUSCULAR; INTRAVENOUS at 15:05

## 2020-04-20 RX ADMIN — FENTANYL CITRATE 100 MCG: 50 INJECTION, SOLUTION INTRAMUSCULAR; INTRAVENOUS at 14:42

## 2020-04-20 RX ADMIN — ROCURONIUM BROMIDE 80 MG: 10 INJECTION, SOLUTION INTRAVENOUS at 14:47

## 2020-04-20 RX ADMIN — LABETALOL HYDROCHLORIDE 10 MG: 5 INJECTION, SOLUTION INTRAVENOUS at 15:10

## 2020-04-20 RX ADMIN — PROPOFOL 150 MG: 10 INJECTION, EMULSION INTRAVENOUS at 14:47

## 2020-04-20 RX ADMIN — KETOROLAC TROMETHAMINE 30 MG: 30 INJECTION, SOLUTION INTRAMUSCULAR at 15:05

## 2020-04-20 RX ADMIN — SODIUM CHLORIDE, POTASSIUM CHLORIDE, SODIUM LACTATE AND CALCIUM CHLORIDE: 600; 310; 30; 20 INJECTION, SOLUTION INTRAVENOUS at 12:28

## 2020-04-20 RX ADMIN — LIDOCAINE HYDROCHLORIDE 100 MG: 20 INJECTION, SOLUTION EPIDURAL; INFILTRATION; INTRACAUDAL at 14:47

## 2020-04-20 RX ADMIN — DEXAMETHASONE SODIUM PHOSPHATE 4 MG: 4 INJECTION, SOLUTION INTRA-ARTICULAR; INTRALESIONAL; INTRAMUSCULAR; INTRAVENOUS; SOFT TISSUE at 14:47

## 2020-04-20 RX ADMIN — PROPOFOL 30 MG: 10 INJECTION, EMULSION INTRAVENOUS at 15:07

## 2020-04-20 NOTE — ANESTHESIA PROCEDURE NOTES
Airway  Date/Time: 4/20/2020 2:48 PM  Performed by: Ramses Saravia M.D.  Authorized by: Ramses Saravia M.D.     Location:  OR  Urgency:  Elective  Indications for Airway Management:  Anesthesia      Spontaneous Ventilation: absent    Sedation Level:  Deep  Preoxygenated: Yes    Patient Position:  Sniffing  Mask Difficulty Assessment:  0 - not attempted  Final Airway Type:  Endotracheal airway  Final Endotracheal Airway:  ETT  Cuffed: Yes    Technique Used for Successful ETT Placement:  Video laryngoscopy  Insertion Site:  Oral  Blade Type:  Glide  Laryngoscope Blade/Videolaryngoscope Blade Size:  3  ETT Size (mm):  7.0  Measured from:  Teeth  ETT to Teeth (cm):  22  Placement Verified by: auscultation and capnometry    Cormack-Lehane Classification:  Grade I - full view of glottis  Number of Attempts at Approach:  1

## 2020-04-20 NOTE — DISCHARGE INSTRUCTIONS
ACTIVITY: Rest and take it easy for the first 24 hours.  A responsible adult is recommended to remain with you during that time.  It is normal to feel sleepy.  We encourage you to not do anything that requires balance, judgment or coordination.    MILD FLU-LIKE SYMPTOMS ARE NORMAL. YOU MAY EXPERIENCE GENERALIZED MUSCLE ACHES, THROAT IRRITATION, HEADACHE AND/OR SOME NAUSEA.    FOR 24 HOURS DO NOT:  Drive, operate machinery or run household appliances.  Drink beer or alcoholic beverages.   Make important decisions or sign legal documents.    SPECIAL INSTRUCTIONS:   Patient Discharge Instructions:  No heavy lifting for 1 week  Pelvic rest (no tampons, no douche, no baths, no intercourse) for 2 weeks.  Call Dr. Lucero office at [phone] for heavy vaginal bleeding or fever > 100.5 Degrees F      DIET: To avoid nausea, slowly advance diet as tolerated, avoiding spicy or greasy foods for the first day.  Add more substantial food to your diet according to your physician's instructions.  Babies can be fed formula or breast milk as soon as they are hungry.  INCREASE FLUIDS AND FIBER TO AVOID CONSTIPATION.        FOLLOW-UP APPOINTMENT:  A follow-up appointment should be arranged with your doctor in 1-2 weeks; call to schedule.    You should CALL YOUR PHYSICIAN if you develop:  Fever greater than 101 degrees F.  Pain not relieved by medication, or persistent nausea or vomiting.  Excessive bleeding (blood soaking through dressing) or unexpected drainage from the wound.  Extreme redness or swelling around the incision site, drainage of pus or foul smelling drainage.  Inability to urinate or empty your bladder within 8 hours.  Problems with breathing or chest pain.    You should call 911 if you develop problems with breathing or chest pain.  If you are unable to contact your doctor or surgical center, you should go to the nearest emergency room or urgent care center.    If any questions arise, call your doctor.  If your  doctor is not available, please feel free to call the Surgical Center at [phone].  The Center is open Monday through Friday from 7AM to 7PM.  You can also call the HEALTH HOTLINE open 24 hours/day, 7 days/week and speak to a nurse at [phone], or toll free at [phone].    A registered nurse may call you a few days after your surgery to see how you are doing after your procedure.    MEDICATIONS: Resume taking daily medication.  Take prescribed pain medication with food.  If no medication is prescribed, you may take non-aspirin pain medication if needed.  PAIN MEDICATION CAN BE VERY CONSTIPATING.  Take a stool softener or laxative such as senokot, pericolace, or milk of magnesia if needed.    Prescription given for none given.  Last pain medication given at none given.    If your physician has prescribed pain medication that includes Acetaminophen (Tylenol), do not take additional Acetaminophen (Tylenol) while taking the prescribed medication.    Depression / Suicide Risk    As you are discharged from this [facility] facility, it is important to learn how to keep safe from harming yourself.    Recognize the warning signs:  · Abrupt changes in personality, positive or negative- including increase in energy   · Giving away possessions  · Change in eating patterns- significant weight changes-  positive or negative  · Change in sleeping patterns- unable to sleep or sleeping all the time   · Unwillingness or inability to communicate  · Depression  · Unusual sadness, discouragement and loneliness  · Talk of wanting to die  · Neglect of personal appearance   · Rebelliousness- reckless behavior  · Withdrawal from people/activities they love  · Confusion- inability to concentrate     If you or a loved one observes any of these behaviors or has concerns about self-harm, here's what you can do:  · Talk about it- your feelings and reasons for harming yourself  · Remove any means that you might use to hurt  yourself (examples: pills, rope, extension cords, firearm)  · Get professional help from the community (Mental Health, Substance Abuse, psychological counseling)  · Do not be alone:Call your Safe Contact- someone whom you trust who will be there for you.  · Call your local CRISIS HOTLINE 023-0918 or 345-904-9106  · Call your local Children's Mobile Crisis Response Team [place] (099) 571-5125 or www.[employer]  · Call the toll free National Suicide Prevention Hotlines   · National Suicide Prevention Lifeline 478-909-XCJL (9515)  · National Hope Line Network 800-SUICIDE (222-3241)

## 2020-04-20 NOTE — ANESTHESIA PREPROCEDURE EVALUATION
Relevant Problems   CARDIAC   (+) Essential hypertension      Other   (+) Arthritis   (+) Opiate dependence (HCC)       Physical Exam    Airway   Mallampati: II  TM distance: >3 FB  Neck ROM: full       Cardiovascular - normal exam  Rhythm: regular  Rate: normal  (-) murmur     Dental - normal exam         Pulmonary - normal exam  Breath sounds clear to auscultation     Abdominal    Neurological - normal exam                 Anesthesia Plan    ASA 2       Plan - general       Airway plan will be ETT        Induction: intravenous    Postoperative Plan: Postoperative administration of opioids is intended.    Pertinent diagnostic labs and testing reviewed    Informed Consent:    Anesthetic plan and risks discussed with patient.    Use of blood products discussed with: patient whom consented to blood products.

## 2020-04-20 NOTE — H&P
HISTORY OF PRESENT ILLNESS:  A 79-year-old woman with postmenopausal vaginal   bleeding.  She was previously on hormone therapy.  It was at the time of her   presentation with bleeding, unclear if she was compliant with her   progesterone.  Apparently in 12/2019, she reported that she attempted to go   off hormone therapy and had significant vasomotor symptoms and resumed her   estrogen, but was unsure if she resumed progesterone.  An endometrial biopsy   was performed and showed a focus of possible clear cell carcinoma.  The plan   is for D and C.    PAST MEDICAL HISTORY:  1.  Hypertension.  2.  Osteoarthritis.  3.  Osteoporosis.    PAST SURGICAL HISTORY:  1.  In 2009, left rotator cuff.  2.  In 2017, right rotator cuff.    OBSTETRIC HISTORY:  G2, P2.    GYNECOLOGIC HISTORY:  Pap test normal in 2019.    SOCIAL HISTORY:  She denies alcohol abuse.  She is a nonsmoker.    REVIEW OF SYSTEMS:  Positive for postmenopausal bleeding and is otherwise   negative for 10 review of systems.    FAMILY HISTORY:  Noncontributory.    PHYSICAL EXAMINATION:  VITAL SIGNS:  Weight 142 pounds, height 65 inches, BMI is 24, blood pressure   140/78.  GENERAL:  Well nourished, well developed.  CARDIOVASCULAR:  RRR.  PULMONARY:  CTAB.  ABDOMEN:  Soft and nontender.  PELVIC:  Normal external genitalia, atrophic vagina.  Cervix is normal.    Uterus is normal size and position.  Endometrial biopsy performed in 01/2020   shows fragments of disordered weakly proliferative endometrium with small   focus of clear cell change, cannot exclude clear cell adenocarcinoma.    Additional fragments of benign endometrial polyp.    ASSESSMENT AND PLAN:  A 79-year-old woman with postmenopausal bleeding, unsure   if has been compliant with the progesterone component of her hormone therapy.    Since endometrial biopsy has been off of all hormone therapy, both estrogen   and progesterone, and states bleeding has resolved.  A transvaginal ultrasound   was  performed, which shows a thickened endometrium of 13 mm.  An endometrial   biopsy was performed, which was inconclusive, but showed a small focus of   possible clear cell change and cannot exclude clear cell adenocarcinoma, also   likely has a benign polyp.  She had surgery scheduled on 03/04/2020, but was   canceled due to hypertension.  The plan is for hysteroscopy, D and C, and for   improved endometrial sample and diagnosis.       ____________________________________     MD ERIN David / NTS    DD:  04/20/2020 10:50:07  DT:  04/20/2020 11:21:26    D#:  [id number]  Job#:  [id number]

## 2020-04-20 NOTE — OR NURSING
Patient AAOx4, calm, no c/o pain or discomfort. VSS, afebrile, room air 100%. No nausea, tolerating sips of water. Small amount of vaginal bleeding noted, peripad changed. Spouse updated on status and plan of care.

## 2020-04-20 NOTE — OR NURSING
1606: Patient arrived to phase 2. Patient A&OX4. Patient denies any pain or discomfort at this time. No vaginal bleeding noted.

## 2020-04-20 NOTE — ANESTHESIA TIME REPORT
Anesthesia Start and Stop Event Times     Date Time Event    4/20/2020 1416 Ready for Procedure     1441 Anesthesia Start     1521 Anesthesia Stop        Responsible Staff  04/20/20    Name Role Begin End    Ramses Saravia M.D. Anesth 1441 1521        Preop Diagnosis (Free Text):  Pre-op Diagnosis     POST MENOPAUSAL BLEEDING        Preop Diagnosis (Codes):    Post op Diagnosis  Post-menopausal bleeding      Premium Reason  A. 3PM - 7AM    Comments:

## 2020-04-22 ASSESSMENT — PAIN SCALES - GENERAL: PAIN_LEVEL: 0

## 2020-04-22 NOTE — OP REPORT
DATE OF SERVICE:  04/20/2020    PREOPERATIVE DIAGNOSIS:  Postmenopausal bleeding.    POSTOPERATIVE DIAGNOSIS:  Postmenopausal bleeding.    PROCEDURES:  1.  Hysteroscopy.  2.  D and C.    SURGEON:  Thomas Lucero MD    ANESTHESIA TYPE:  General with ET tube.    ANESTHESIOLOGIST:  Ramses Saravia MD    ESTIMATED BLOOD LOSS:  10 mL.    COMPLICATIONS:  None.    INDICATIONS:  The patient has had several episodes of postmenopausal bleeding.    A transvaginal ultrasound showed a thickened endometrium at 13 mm.  An   endometrial biopsy was performed, which showed fragments of a benign polyp,   but also a focus of clear cell, cannot rule out clear cell adenocarcinoma.    The decision was made to proceed with D and C for diagnoses.    PROCEDURE IN DETAIL:  The patient was taken to the operating room where her   general anesthesia was found to be adequate.  She was prepped and draped in   the normal sterile fashion and placed in dorsal lithotomy position in Blaise   stirrups.  A sterile speculum was placed in the vagina.  The anterior lip of   the cervix was grasped with a tenaculum.  A 10 mL of 0.25% Marcaine were   injected at 4 and 8 o'clock for a cervical block.  The cervix was dilated to   accommodate the diagnostic hysteroscope.  This was done without difficulty.    The hysteroscope was passed.  There was no dominant polyp noted in the   endometrial cavity.  The hysteroscope was removed.  The sharp curette was used   systematically to perform a thorough curettage starting at 12 o'clock and   moving in the clockwise direction until the entire cavity was sampled and a   gritty texture was noted.  The sample was sent to pathology.  The tenaculum   was removed and the cervix was noted to be hemostatic.  All instruments were   removed.  The patient woke from anesthesia without difficulty and was taken to   the PACU in stable condition.       ____________________________________     Thomas Lucero MD    TFF /  TETE    DD:  04/22/2020 12:53:27  DT:  04/22/2020 13:00:52    D#:  [id number]  Job#:  [id number]

## 2020-04-23 NOTE — ANESTHESIA POSTPROCEDURE EVALUATION
Patient: Nafisa Hancock    Procedure Summary     Date:  04/20/20 Room / Location:  [place] [address] / SURGERY [facility]    Anesthesia Start:  1441 Anesthesia Stop:  1521    Procedure:  DILATION AND CURETTAGE (N/A Uterus) Diagnosis:  (POST MENOPAUSAL BLEEDING)    Surgeon:  Thomas Lucero M.D. Responsible Provider:  Ramses Saravia M.D.    Anesthesia Type:  general ASA Status:  2          Final Anesthesia Type: general  Last vitals  /69       Temp 36.4 C       Pulse 65      Resp 16       SpO2 99         Anesthesia Post Evaluation    Patient location during evaluation: PACU  Patient participation: complete - patient participated  Level of consciousness: awake and alert  Pain score: 0    Airway patency: patent  Anesthetic complications: no  Cardiovascular status: hemodynamically stable  Respiratory status: acceptable  Hydration status: euvolemic    PONV: none           Nurse Pain Score: 0 (NPRS)

## 2020-05-29 RX ORDER — LISINOPRIL 20 MG/1
20 TABLET ORAL DAILY
Qty: 90 TAB | Refills: 3 | Status: SHIPPED | OUTPATIENT
Start: 2020-05-29 | End: 2020-09-30 | Stop reason: SDUPTHER

## 2020-06-02 RX ORDER — LISINOPRIL 40 MG/1
40 TABLET ORAL DAILY
Qty: 100 TAB | Refills: 3 | Status: SHIPPED | OUTPATIENT
Start: 2020-06-02 | End: 2020-09-30

## 2020-09-17 ENCOUNTER — HOSPITAL ENCOUNTER (OUTPATIENT)
Dept: RADIOLOGY | Facility: MEDICAL CENTER | Age: 80
End: 2020-09-17
Attending: OBSTETRICS & GYNECOLOGY
Payer: MEDICARE

## 2020-09-17 DIAGNOSIS — Z12.31 VISIT FOR SCREENING MAMMOGRAM: ICD-10-CM

## 2020-09-17 PROCEDURE — 77067 SCR MAMMO BI INCL CAD: CPT

## 2020-09-30 ENCOUNTER — OFFICE VISIT (OUTPATIENT)
Dept: MEDICAL GROUP | Facility: MEDICAL CENTER | Age: 80
End: 2020-09-30
Payer: MEDICARE

## 2020-09-30 VITALS
OXYGEN SATURATION: 98 % | BODY MASS INDEX: 23.99 KG/M2 | DIASTOLIC BLOOD PRESSURE: 80 MMHG | TEMPERATURE: 98 F | WEIGHT: 144 LBS | SYSTOLIC BLOOD PRESSURE: 132 MMHG | HEIGHT: 65 IN | HEART RATE: 74 BPM

## 2020-09-30 DIAGNOSIS — Z23 NEED FOR VACCINATION: ICD-10-CM

## 2020-09-30 DIAGNOSIS — I10 ESSENTIAL HYPERTENSION: ICD-10-CM

## 2020-09-30 PROCEDURE — 99213 OFFICE O/P EST LOW 20 MIN: CPT | Mod: 25 | Performed by: INTERNAL MEDICINE

## 2020-09-30 PROCEDURE — 90662 IIV NO PRSV INCREASED AG IM: CPT | Performed by: INTERNAL MEDICINE

## 2020-09-30 PROCEDURE — G0008 ADMIN INFLUENZA VIRUS VAC: HCPCS | Performed by: INTERNAL MEDICINE

## 2020-09-30 RX ORDER — LISINOPRIL 20 MG/1
20 TABLET ORAL DAILY
Qty: 90 TAB | Refills: 3 | Status: SHIPPED | OUTPATIENT
Start: 2020-09-30 | End: 2020-11-02 | Stop reason: SDUPTHER

## 2020-09-30 NOTE — PROGRESS NOTES
"      CC: Follow-up hypertension.                                                                                                                                      HPI:   Nafisa presents today with the following.    1. Essential hypertension  Etc. recently increasing her dose of lisinopril to 40 mg preoperative.  Her pain is down somewhat and her blood pressures have come down as well.  She started getting dizzy so cut the pill in half to 20 mg.  Tolerating well without side effect and requesting refills.  No chest pain or shortness of breath    2. Need for vaccination        Patient Active Problem List    Diagnosis Date Noted   • Opiate dependence (HCC) 08/21/2019   • Dyslipidemia 11/15/2018   • Essential hypertension 02/23/2017   • Right shoulder pain 02/16/2017   • Arthritis 05/24/2011   • Osteoporosis, senile 06/18/2010       Current Outpatient Medications   Medication Sig Dispense Refill   • lisinopril (PRINIVIL) 20 MG Tab Take 1 Tab by mouth every day. 90 Tab 3   • acetaminophen (TYLENOL) 500 MG Tab Take 500-1,000 mg by mouth every 6 hours as needed for Moderate Pain.     • Omega-3 Fatty Acids (OMEGA-3 FISH OIL PO) Take 1 Cap by mouth every day.     • HYDROcodone/acetaminophen (NORCO)  MG Tab Take 0.5 Tabs by mouth 2 times a day as needed for Severe Pain.     • Calcium Carbonate-Vitamin D (CALCIUM + D PO) Take 1,200 mg by mouth every day.     • Multiple Vitamin (MULTIVITAMINS PO) Take 1 Tab by mouth every day. Centrum silver     • Cyanocobalamin (VITAMIN B-12 PO) Take 500 mcg by mouth every day.       No current facility-administered medications for this visit.          Allergies as of 09/30/2020 - Reviewed 09/30/2020   Allergen Reaction Noted   • Nkda [no known drug allergy]  09/22/2009        ROS: Denies Chest pain, SOB, LE edema.    /80 (BP Location: Right arm, Patient Position: Sitting)   Pulse 74   Temp 36.7 °C (98 °F)   Ht 1.651 m (5' 5\")   Wt 65.3 kg (144 lb)   LMP 01/13/2017 (Exact " Date) Comment: Due to hormone therapy to prevent cancer  SpO2 98%   BMI 23.96 kg/m²     Physical Exam:  Gen:         Alert and oriented, No apparent distress.  Neck:        No Lymphadenopathy or Bruits.  Lungs:     Clear to auscultation bilaterally  CV:          Regular rate and rhythm. No murmurs, rubs or gallops.               Ext:          No clubbing, cyanosis, edema.      Assessment and Plan.   80 y.o. female with the following issues.    1. Essential hypertension  Currently well controlled, Discuss diet, exercise and salt restriction.  No change to medication therapy.  - lisinopril (PRINIVIL) 20 MG Tab; Take 1 Tab by mouth every day.  Dispense: 90 Tab; Refill: 3    2. Need for vaccination    - INFLUENZA VACCINE, HIGH DOSE (65+ ONLY)

## 2020-11-02 DIAGNOSIS — I10 ESSENTIAL HYPERTENSION: ICD-10-CM

## 2020-11-02 RX ORDER — LISINOPRIL 20 MG/1
20 TABLET ORAL DAILY
Qty: 100 TAB | Refills: 0 | Status: ON HOLD | OUTPATIENT
Start: 2020-11-02 | End: 2021-02-22 | Stop reason: SDUPTHER

## 2021-01-11 DIAGNOSIS — Z23 NEED FOR VACCINATION: ICD-10-CM

## 2021-02-05 ENCOUNTER — OFFICE VISIT (OUTPATIENT)
Dept: MEDICAL GROUP | Facility: PHYSICIAN GROUP | Age: 81
End: 2021-02-05
Payer: MEDICARE

## 2021-02-05 VITALS
WEIGHT: 137.4 LBS | OXYGEN SATURATION: 96 % | DIASTOLIC BLOOD PRESSURE: 88 MMHG | BODY MASS INDEX: 22.89 KG/M2 | HEART RATE: 90 BPM | TEMPERATURE: 98.3 F | RESPIRATION RATE: 16 BRPM | SYSTOLIC BLOOD PRESSURE: 138 MMHG | HEIGHT: 65 IN

## 2021-02-05 DIAGNOSIS — I10 ESSENTIAL HYPERTENSION: ICD-10-CM

## 2021-02-05 DIAGNOSIS — Z78.0 POSTMENOPAUSAL: ICD-10-CM

## 2021-02-05 DIAGNOSIS — F11.20 UNCOMPLICATED OPIOID DEPENDENCE (HCC): ICD-10-CM

## 2021-02-05 PROCEDURE — 99214 OFFICE O/P EST MOD 30 MIN: CPT | Performed by: FAMILY MEDICINE

## 2021-02-05 RX ORDER — ESTRADIOL 0.5 MG/1
TABLET ORAL DAILY
Status: ON HOLD | COMMUNITY
Start: 2020-12-07 | End: 2021-02-22 | Stop reason: SDUPTHER

## 2021-02-05 RX ORDER — MEDROXYPROGESTERONE ACETATE 2.5 MG/1
TABLET ORAL
Status: ON HOLD | COMMUNITY
Start: 2020-12-02 | End: 2021-02-22 | Stop reason: SDUPTHER

## 2021-02-05 ASSESSMENT — PATIENT HEALTH QUESTIONNAIRE - PHQ9: CLINICAL INTERPRETATION OF PHQ2 SCORE: 0

## 2021-02-05 NOTE — PROGRESS NOTES
Subjective:     Chief Complaint   Patient presents with   • Establish Care       HPI:   Nafisa presents today to discuss the following.  Prior PCP: Dr. Reyes    Essential hypertension  This is a chronic issue  Lifestyle controlled  No longer taking lisinopril  Measures BP at home and usually 120-130s systolic at home. Diastolic usually ranges in the 80s.     Opiate dependence (HCC)  This is a chronic issue  Following up with pain management  Suffers from b/l shoulder pain.   Has a known torn right rotator cuff tears b/l. In spite of the surgery has chronic pain.   On norco 10/325mg once every 6 hours.    Postmenopausal  This is a chronic issue  Following up with GYN for this  On estrace, and provera for this  This regimen works for her  Risks and benefits have gone over and she understands      Past Medical History:   Diagnosis Date   • Arthritis 04/16/2020    hands, feet   • CATARACT 2012    bilateral phaco with IOL   • Hypertension 04/16/2020   • MEDICAL HOME 11/07/12   • OSTEOPOROSIS 6/18/2010   • Pain     right shoulder       Social History     Tobacco Use   • Smoking status: Never Smoker   • Smokeless tobacco: Never Used   Substance Use Topics   • Alcohol use: Not Currently     Alcohol/week: 0.0 oz   • Drug use: No       Current Outpatient Medications Ordered in Epic   Medication Sig Dispense Refill   • estradiol (ESTRACE) 0.5 MG tablet Take  by mouth every day. Take 1 tablet by mouth every day     • medroxyPROGESTERone (PROVERA) 2.5 MG Tab TK 1 T PO D AS LONG AS USING ESTROGEN     • lisinopril (PRINIVIL) 20 MG Tab Take 1 Tab by mouth every day. (Patient taking differently: Take 10 mg by mouth every day.) 100 Tab 0   • acetaminophen (TYLENOL) 500 MG Tab Take 500-1,000 mg by mouth every 6 hours as needed for Moderate Pain.     • Omega-3 Fatty Acids (OMEGA-3 FISH OIL PO) Take 1 Cap by mouth every day.     • HYDROcodone/acetaminophen (NORCO)  MG Tab Take 0.5 Tabs by mouth 2 times a day as needed for Severe  "Pain.     • Calcium Carbonate-Vitamin D (CALCIUM + D PO) Take 1,200 mg by mouth every day.     • Multiple Vitamin (MULTIVITAMINS PO) Take 1 Tab by mouth every day. Centrum silver     • Cyanocobalamin (VITAMIN B-12 PO) Take 500 mcg by mouth every day.       No current [facility]-ordered facility-administered medications on file.        Allergies:  Nkda [no known drug allergy]    Health Maintenance: Completed    ROS:  Gen: no fevers/chills, no changes in weight  Eyes: no changes in vision  ENT: no sore throat, no hearing loss, no bloody nose  Pulm: no sob, no cough  CV: no chest pain, no palpitations  GI: no nausea/vomiting, no diarrhea      Objective:     Exam:  /88 (BP Location: Left arm, Patient Position: Sitting, BP Cuff Size: Adult)   Pulse 90   Temp 36.8 °C (98.3 °F) (Temporal)   Resp 16   Ht 1.651 m (5' 5\")   Wt 62.3 kg (137 lb 6.4 oz)   LMP 01/13/2017 (Exact Date) Comment: Due to hormone therapy to prevent cancer  SpO2 96%   BMI 22.86 kg/m²  Body mass index is 22.86 kg/m².    Constitutional: Alert, no distress, well-groomed.  Skin: Warm, dry, good turgor, no rashes in visible areas.  Eye: Equal, round and reactive, conjunctiva clear, lids normal.  ENMT: Lips without lesions, good dentition, moist mucous membranes.  Neck: Trachea midline, no masses, no thyromegaly.  Respiratory: Unlabored respiratory effort, no cough.  MSK: Normal gait, moves all extremities.  Neuro: Grossly non-focal.   Psych: Alert and oriented x3, normal affect and mood.        Assessment & Plan:     80 y.o. female with the following -     1. Essential hypertension  This is a chronic, stable condition.  The patient has a longstanding history of hypertension currently diet-controlled.  She was previously on lisinopril 10 mg daily.  However, she has made the decision to stay away from this medicine.  She continues to log her blood pressure at home and it has been in the 130s-140s systolic over 80s without the medicine.  Goal blood " pressure less than 150/90 per JNC 8 guidelines.  Repeat blood work will be put in today.  - CBC WITHOUT DIFFERENTIAL; Future  - Basic Metabolic Panel; Future  - Lipid Profile; Future    2. Uncomplicated opioid dependence (HCC)  This is a chronic, stable condition.  The patient has a history of uncomplicated opioid dependence due to chronic pain to both her shoulders.  She is following up with pain management for this.  She is on Norco 5/3/2025's every 6 hours as needed.  -Continue to follow-up with pain management    3. Postmenopausal  This is a chronic, stable condition.  Follows up with gynecology for this.  She is on hormone replacement therapy.  Risks and benefits of the regimen have already been discussed with the patient.  She would like to continue with HRT.  - CBC WITHOUT DIFFERENTIAL; Future  - Basic Metabolic Panel; Future  - Lipid Profile; Future      Return in about 6 months (around 8/5/2021).    Please note that this dictation was created using voice recognition software. I have made every reasonable attempt to correct obvious errors, but I expect that there are errors of grammar and possibly content that I did not discover before finalizing the note.

## 2021-02-05 NOTE — ASSESSMENT & PLAN NOTE
This is a chronic issue  Lifestyle controlled  No longer taking lisinopril  Measures BP at home and usually 120-130s systolic at home. Diastolic usually ranges in the 80s.

## 2021-02-05 NOTE — ASSESSMENT & PLAN NOTE
This is a chronic issue  Following up with GYN for this  On estrace, and provera for this  This regimen works for her  Risks and benefits have gone over and she understands

## 2021-02-05 NOTE — ASSESSMENT & PLAN NOTE
This is a chronic issue  Following up with pain management  Suffers from b/l shoulder pain.   Has a known torn right rotator cuff tears b/l. In spite of the surgery has chronic pain.   On norco 10/325mg once every 6 hours.

## 2021-02-17 ENCOUNTER — APPOINTMENT (OUTPATIENT)
Dept: RADIOLOGY | Facility: MEDICAL CENTER | Age: 81
DRG: 536 | End: 2021-02-17
Attending: EMERGENCY MEDICINE
Payer: MEDICARE

## 2021-02-17 ENCOUNTER — HOSPITAL ENCOUNTER (INPATIENT)
Facility: MEDICAL CENTER | Age: 81
LOS: 5 days | DRG: 536 | End: 2021-02-22
Attending: EMERGENCY MEDICINE | Admitting: STUDENT IN AN ORGANIZED HEALTH CARE EDUCATION/TRAINING PROGRAM
Payer: MEDICARE

## 2021-02-17 DIAGNOSIS — I10 ESSENTIAL HYPERTENSION: ICD-10-CM

## 2021-02-17 DIAGNOSIS — S32.82XA MULTIPLE CLOSED FRACTURES OF PELVIS WITHOUT DISRUPTION OF PELVIC RING, INITIAL ENCOUNTER (HCC): ICD-10-CM

## 2021-02-17 DIAGNOSIS — N94.89 PELVIC HEMATOMA IN FEMALE: ICD-10-CM

## 2021-02-17 LAB
ALBUMIN SERPL BCP-MCNC: 4.2 G/DL (ref 3.2–4.9)
ALBUMIN/GLOB SERPL: 1.4 G/DL
ALP SERPL-CCNC: 65 U/L (ref 30–99)
ALT SERPL-CCNC: 21 U/L (ref 2–50)
ANION GAP SERPL CALC-SCNC: 12 MMOL/L (ref 7–16)
APTT PPP: 29.2 SEC (ref 24.7–36)
AST SERPL-CCNC: 26 U/L (ref 12–45)
BASOPHILS # BLD AUTO: 0.3 % (ref 0–1.8)
BASOPHILS # BLD: 0.04 K/UL (ref 0–0.12)
BILIRUB SERPL-MCNC: 0.3 MG/DL (ref 0.1–1.5)
BUN SERPL-MCNC: 11 MG/DL (ref 8–22)
CALCIUM SERPL-MCNC: 8.8 MG/DL (ref 8.5–10.5)
CHLORIDE SERPL-SCNC: 100 MMOL/L (ref 96–112)
CO2 SERPL-SCNC: 21 MMOL/L (ref 20–33)
CREAT SERPL-MCNC: 0.67 MG/DL (ref 0.5–1.4)
EOSINOPHIL # BLD AUTO: 0.06 K/UL (ref 0–0.51)
EOSINOPHIL NFR BLD: 0.4 % (ref 0–6.9)
ERYTHROCYTE [DISTWIDTH] IN BLOOD BY AUTOMATED COUNT: 45.6 FL (ref 35.9–50)
GLOBULIN SER CALC-MCNC: 2.9 G/DL (ref 1.9–3.5)
GLUCOSE SERPL-MCNC: 105 MG/DL (ref 65–99)
HCT VFR BLD AUTO: 38.7 % (ref 37–47)
HGB BLD-MCNC: 12.9 G/DL (ref 12–16)
IMM GRANULOCYTES # BLD AUTO: 0.11 K/UL (ref 0–0.11)
IMM GRANULOCYTES NFR BLD AUTO: 0.7 % (ref 0–0.9)
INR PPP: 1.04 (ref 0.87–1.13)
LYMPHOCYTES # BLD AUTO: 1.23 K/UL (ref 1–4.8)
LYMPHOCYTES NFR BLD: 8.2 % (ref 22–41)
MCH RBC QN AUTO: 30.9 PG (ref 27–33)
MCHC RBC AUTO-ENTMCNC: 33.3 G/DL (ref 33.6–35)
MCV RBC AUTO: 92.6 FL (ref 81.4–97.8)
MONOCYTES # BLD AUTO: 0.62 K/UL (ref 0–0.85)
MONOCYTES NFR BLD AUTO: 4.1 % (ref 0–13.4)
NEUTROPHILS # BLD AUTO: 12.91 K/UL (ref 2–7.15)
NEUTROPHILS NFR BLD: 86.3 % (ref 44–72)
NRBC # BLD AUTO: 0 K/UL
NRBC BLD-RTO: 0 /100 WBC
PLATELET # BLD AUTO: 252 K/UL (ref 164–446)
PMV BLD AUTO: 9.7 FL (ref 9–12.9)
POTASSIUM SERPL-SCNC: 3.6 MMOL/L (ref 3.6–5.5)
PROT SERPL-MCNC: 7.1 G/DL (ref 6–8.2)
PROTHROMBIN TIME: 13.9 SEC (ref 12–14.6)
RBC # BLD AUTO: 4.18 M/UL (ref 4.2–5.4)
SODIUM SERPL-SCNC: 133 MMOL/L (ref 135–145)
WBC # BLD AUTO: 15 K/UL (ref 4.8–10.8)

## 2021-02-17 PROCEDURE — 73080 X-RAY EXAM OF ELBOW: CPT | Mod: RT

## 2021-02-17 PROCEDURE — 72192 CT PELVIS W/O DYE: CPT | Mod: MG

## 2021-02-17 PROCEDURE — 90471 IMMUNIZATION ADMIN: CPT

## 2021-02-17 PROCEDURE — 303105 HCHG CATHETER EXTRA

## 2021-02-17 PROCEDURE — U0005 INFEC AGEN DETEC AMPLI PROBE: HCPCS

## 2021-02-17 PROCEDURE — 85610 PROTHROMBIN TIME: CPT

## 2021-02-17 PROCEDURE — 85730 THROMBOPLASTIN TIME PARTIAL: CPT

## 2021-02-17 PROCEDURE — 72194 CT PELVIS W/O & W/DYE: CPT

## 2021-02-17 PROCEDURE — 700111 HCHG RX REV CODE 636 W/ 250 OVERRIDE (IP)

## 2021-02-17 PROCEDURE — 90715 TDAP VACCINE 7 YRS/> IM: CPT | Performed by: EMERGENCY MEDICINE

## 2021-02-17 PROCEDURE — 80053 COMPREHEN METABOLIC PANEL: CPT

## 2021-02-17 PROCEDURE — 99285 EMERGENCY DEPT VISIT HI MDM: CPT

## 2021-02-17 PROCEDURE — 51702 INSERT TEMP BLADDER CATH: CPT

## 2021-02-17 PROCEDURE — 700117 HCHG RX CONTRAST REV CODE 255: Performed by: EMERGENCY MEDICINE

## 2021-02-17 PROCEDURE — 700111 HCHG RX REV CODE 636 W/ 250 OVERRIDE (IP): Performed by: EMERGENCY MEDICINE

## 2021-02-17 PROCEDURE — 770006 HCHG ROOM/CARE - MED/SURG/GYN SEMI*

## 2021-02-17 PROCEDURE — 85025 COMPLETE CBC W/AUTO DIFF WBC: CPT

## 2021-02-17 PROCEDURE — 96375 TX/PRO/DX INJ NEW DRUG ADDON: CPT

## 2021-02-17 PROCEDURE — 700101 HCHG RX REV CODE 250: Performed by: EMERGENCY MEDICINE

## 2021-02-17 PROCEDURE — U0003 INFECTIOUS AGENT DETECTION BY NUCLEIC ACID (DNA OR RNA); SEVERE ACUTE RESPIRATORY SYNDROME CORONAVIRUS 2 (SARS-COV-2) (CORONAVIRUS DISEASE [COVID-19]), AMPLIFIED PROBE TECHNIQUE, MAKING USE OF HIGH THROUGHPUT TECHNOLOGIES AS DESCRIBED BY CMS-2020-01-R: HCPCS

## 2021-02-17 PROCEDURE — 3E0234Z INTRODUCTION OF SERUM, TOXOID AND VACCINE INTO MUSCLE, PERCUTANEOUS APPROACH: ICD-10-PCS | Performed by: EMERGENCY MEDICINE

## 2021-02-17 PROCEDURE — 96374 THER/PROPH/DIAG INJ IV PUSH: CPT

## 2021-02-17 PROCEDURE — 0HQDXZZ REPAIR RIGHT LOWER ARM SKIN, EXTERNAL APPROACH: ICD-10-PCS | Performed by: EMERGENCY MEDICINE

## 2021-02-17 RX ORDER — HYDROCODONE BITARTRATE AND ACETAMINOPHEN 10; 325 MG/1; MG/1
0.5 TABLET ORAL 2 TIMES DAILY PRN
Status: DISCONTINUED | OUTPATIENT
Start: 2021-02-17 | End: 2021-02-17

## 2021-02-17 RX ORDER — ONDANSETRON 2 MG/ML
4 INJECTION INTRAMUSCULAR; INTRAVENOUS EVERY 4 HOURS PRN
Status: DISCONTINUED | OUTPATIENT
Start: 2021-02-17 | End: 2021-02-22 | Stop reason: HOSPADM

## 2021-02-17 RX ORDER — HYDROMORPHONE HYDROCHLORIDE 1 MG/ML
0.5 INJECTION, SOLUTION INTRAMUSCULAR; INTRAVENOUS; SUBCUTANEOUS
Status: DISCONTINUED | OUTPATIENT
Start: 2021-02-17 | End: 2021-02-18

## 2021-02-17 RX ORDER — BISACODYL 10 MG
10 SUPPOSITORY, RECTAL RECTAL
Status: DISCONTINUED | OUTPATIENT
Start: 2021-02-17 | End: 2021-02-22 | Stop reason: HOSPADM

## 2021-02-17 RX ORDER — ACETAMINOPHEN 325 MG/1
650 TABLET ORAL EVERY 6 HOURS PRN
Status: DISCONTINUED | OUTPATIENT
Start: 2021-02-17 | End: 2021-02-22 | Stop reason: HOSPADM

## 2021-02-17 RX ORDER — POLYETHYLENE GLYCOL 3350 17 G/17G
1 POWDER, FOR SOLUTION ORAL
Status: DISCONTINUED | OUTPATIENT
Start: 2021-02-17 | End: 2021-02-22 | Stop reason: HOSPADM

## 2021-02-17 RX ORDER — ONDANSETRON 4 MG/1
4 TABLET, ORALLY DISINTEGRATING ORAL EVERY 4 HOURS PRN
Status: DISCONTINUED | OUTPATIENT
Start: 2021-02-17 | End: 2021-02-22 | Stop reason: HOSPADM

## 2021-02-17 RX ORDER — ESTRADIOL 1 MG/1
0.5 TABLET ORAL DAILY
Status: DISCONTINUED | OUTPATIENT
Start: 2021-02-18 | End: 2021-02-22 | Stop reason: HOSPADM

## 2021-02-17 RX ORDER — MEDROXYPROGESTERONE ACETATE 2.5 MG/1
2.5 TABLET ORAL DAILY
Status: DISCONTINUED | OUTPATIENT
Start: 2021-02-18 | End: 2021-02-22 | Stop reason: HOSPADM

## 2021-02-17 RX ORDER — AMOXICILLIN 250 MG
2 CAPSULE ORAL 2 TIMES DAILY
Status: DISCONTINUED | OUTPATIENT
Start: 2021-02-18 | End: 2021-02-22 | Stop reason: HOSPADM

## 2021-02-17 RX ORDER — LISINOPRIL 10 MG/1
10 TABLET ORAL DAILY
Status: DISCONTINUED | OUTPATIENT
Start: 2021-02-18 | End: 2021-02-22 | Stop reason: HOSPADM

## 2021-02-17 RX ORDER — LABETALOL HYDROCHLORIDE 5 MG/ML
10 INJECTION, SOLUTION INTRAVENOUS EVERY 4 HOURS PRN
Status: DISCONTINUED | OUTPATIENT
Start: 2021-02-17 | End: 2021-02-22 | Stop reason: HOSPADM

## 2021-02-17 RX ORDER — LIDOCAINE HYDROCHLORIDE AND EPINEPHRINE BITARTRATE 20; .01 MG/ML; MG/ML
10 INJECTION, SOLUTION SUBCUTANEOUS ONCE
Status: COMPLETED | OUTPATIENT
Start: 2021-02-17 | End: 2021-02-17

## 2021-02-17 RX ADMIN — FENTANYL CITRATE 50 MCG: 50 INJECTION, SOLUTION INTRAMUSCULAR; INTRAVENOUS at 23:20

## 2021-02-17 RX ADMIN — LIDOCAINE HYDROCHLORIDE AND EPINEPHRINE 10 ML: 20; 10 INJECTION, SOLUTION INFILTRATION; PERINEURAL at 20:15

## 2021-02-17 RX ADMIN — IOHEXOL 25 ML: 300 INJECTION, SOLUTION INTRAVENOUS at 22:45

## 2021-02-17 RX ADMIN — CLOSTRIDIUM TETANI TOXOID ANTIGEN (FORMALDEHYDE INACTIVATED), CORYNEBACTERIUM DIPHTHERIAE TOXOID ANTIGEN (FORMALDEHYDE INACTIVATED), BORDETELLA PERTUSSIS TOXOID ANTIGEN (GLUTARALDEHYDE INACTIVATED), BORDETELLA PERTUSSIS FILAMENTOUS HEMAGGLUTININ ANTIGEN (FORMALDEHYDE INACTIVATED), BORDETELLA PERTUSSIS PERTACTIN ANTIGEN, AND BORDETELLA PERTUSSIS FIMBRIAE 2/3 ANTIGEN 0.5 ML: 5; 2; 2.5; 5; 3; 5 INJECTION, SUSPENSION INTRAMUSCULAR at 20:36

## 2021-02-18 PROBLEM — N94.89 PELVIC HEMATOMA IN FEMALE: Status: ACTIVE | Noted: 2021-02-18

## 2021-02-18 PROBLEM — D72.829 LEUKOCYTOSIS: Status: ACTIVE | Noted: 2021-02-18

## 2021-02-18 PROBLEM — S32.9XXA PELVIC FRACTURE (HCC): Status: ACTIVE | Noted: 2021-02-18

## 2021-02-18 LAB
ANION GAP SERPL CALC-SCNC: 13 MMOL/L (ref 7–16)
APPEARANCE UR: CLEAR
BASOPHILS # BLD AUTO: 0.4 % (ref 0–1.8)
BASOPHILS # BLD: 0.04 K/UL (ref 0–0.12)
BILIRUB UR QL STRIP.AUTO: NEGATIVE
BUN SERPL-MCNC: 9 MG/DL (ref 8–22)
CALCIUM SERPL-MCNC: 8.6 MG/DL (ref 8.5–10.5)
CHLORIDE SERPL-SCNC: 101 MMOL/L (ref 96–112)
CO2 SERPL-SCNC: 21 MMOL/L (ref 20–33)
COLOR UR: YELLOW
CREAT SERPL-MCNC: 0.54 MG/DL (ref 0.5–1.4)
EOSINOPHIL # BLD AUTO: 0.02 K/UL (ref 0–0.51)
EOSINOPHIL NFR BLD: 0.2 % (ref 0–6.9)
ERYTHROCYTE [DISTWIDTH] IN BLOOD BY AUTOMATED COUNT: 44.8 FL (ref 35.9–50)
GLUCOSE SERPL-MCNC: 148 MG/DL (ref 65–99)
GLUCOSE UR STRIP.AUTO-MCNC: NEGATIVE MG/DL
HCT VFR BLD AUTO: 36.8 % (ref 37–47)
HGB BLD-MCNC: 12.3 G/DL (ref 12–16)
IMM GRANULOCYTES # BLD AUTO: 0.05 K/UL (ref 0–0.11)
IMM GRANULOCYTES NFR BLD AUTO: 0.5 % (ref 0–0.9)
KETONES UR STRIP.AUTO-MCNC: NEGATIVE MG/DL
LEUKOCYTE ESTERASE UR QL STRIP.AUTO: NEGATIVE
LYMPHOCYTES # BLD AUTO: 1.24 K/UL (ref 1–4.8)
LYMPHOCYTES NFR BLD: 11.2 % (ref 22–41)
MAGNESIUM SERPL-MCNC: 1.7 MG/DL (ref 1.5–2.5)
MCH RBC QN AUTO: 30.4 PG (ref 27–33)
MCHC RBC AUTO-ENTMCNC: 33.4 G/DL (ref 33.6–35)
MCV RBC AUTO: 91.1 FL (ref 81.4–97.8)
MICRO URNS: NORMAL
MONOCYTES # BLD AUTO: 0.59 K/UL (ref 0–0.85)
MONOCYTES NFR BLD AUTO: 5.3 % (ref 0–13.4)
NEUTROPHILS # BLD AUTO: 9.16 K/UL (ref 2–7.15)
NEUTROPHILS NFR BLD: 82.4 % (ref 44–72)
NITRITE UR QL STRIP.AUTO: NEGATIVE
NRBC # BLD AUTO: 0 K/UL
NRBC BLD-RTO: 0 /100 WBC
PH UR STRIP.AUTO: 5.5 [PH] (ref 5–8)
PLATELET # BLD AUTO: 241 K/UL (ref 164–446)
PMV BLD AUTO: 9.8 FL (ref 9–12.9)
POTASSIUM SERPL-SCNC: 3.5 MMOL/L (ref 3.6–5.5)
PROT UR QL STRIP: NEGATIVE MG/DL
RBC # BLD AUTO: 4.04 M/UL (ref 4.2–5.4)
RBC UR QL AUTO: NEGATIVE
SARS-COV-2 RNA RESP QL NAA+PROBE: NOTDETECTED
SODIUM SERPL-SCNC: 135 MMOL/L (ref 135–145)
SP GR UR STRIP.AUTO: 1.01
SPECIMEN SOURCE: NORMAL
UROBILINOGEN UR STRIP.AUTO-MCNC: 0.2 MG/DL
WBC # BLD AUTO: 11.1 K/UL (ref 4.8–10.8)

## 2021-02-18 PROCEDURE — 99223 1ST HOSP IP/OBS HIGH 75: CPT | Performed by: STUDENT IN AN ORGANIZED HEALTH CARE EDUCATION/TRAINING PROGRAM

## 2021-02-18 PROCEDURE — 700102 HCHG RX REV CODE 250 W/ 637 OVERRIDE(OP): Performed by: STUDENT IN AN ORGANIZED HEALTH CARE EDUCATION/TRAINING PROGRAM

## 2021-02-18 PROCEDURE — 80048 BASIC METABOLIC PNL TOTAL CA: CPT

## 2021-02-18 PROCEDURE — A9270 NON-COVERED ITEM OR SERVICE: HCPCS | Performed by: STUDENT IN AN ORGANIZED HEALTH CARE EDUCATION/TRAINING PROGRAM

## 2021-02-18 PROCEDURE — 700101 HCHG RX REV CODE 250: Performed by: STUDENT IN AN ORGANIZED HEALTH CARE EDUCATION/TRAINING PROGRAM

## 2021-02-18 PROCEDURE — 85025 COMPLETE CBC W/AUTO DIFF WBC: CPT

## 2021-02-18 PROCEDURE — 81003 URINALYSIS AUTO W/O SCOPE: CPT

## 2021-02-18 PROCEDURE — 770006 HCHG ROOM/CARE - MED/SURG/GYN SEMI*

## 2021-02-18 PROCEDURE — 700111 HCHG RX REV CODE 636 W/ 250 OVERRIDE (IP): Performed by: STUDENT IN AN ORGANIZED HEALTH CARE EDUCATION/TRAINING PROGRAM

## 2021-02-18 PROCEDURE — 83735 ASSAY OF MAGNESIUM: CPT

## 2021-02-18 RX ORDER — LISINOPRIL 10 MG/1
TABLET ORAL
Status: ACTIVE
Start: 2021-02-18 | End: 2021-02-18

## 2021-02-18 RX ORDER — ESTRADIOL 1 MG/1
TABLET ORAL
Status: ACTIVE
Start: 2021-02-18 | End: 2021-02-18

## 2021-02-18 RX ORDER — OXYCODONE HYDROCHLORIDE AND ACETAMINOPHEN 5; 325 MG/1; MG/1
2 TABLET ORAL EVERY 4 HOURS PRN
Status: DISCONTINUED | OUTPATIENT
Start: 2021-02-18 | End: 2021-02-22 | Stop reason: HOSPADM

## 2021-02-18 RX ORDER — POTASSIUM CHLORIDE 20 MEQ/1
40 TABLET, EXTENDED RELEASE ORAL ONCE
Status: COMPLETED | OUTPATIENT
Start: 2021-02-18 | End: 2021-02-18

## 2021-02-18 RX ORDER — OXYCODONE HYDROCHLORIDE AND ACETAMINOPHEN 5; 325 MG/1; MG/1
2 TABLET ORAL EVERY 6 HOURS PRN
Status: DISCONTINUED | OUTPATIENT
Start: 2021-02-18 | End: 2021-02-18

## 2021-02-18 RX ORDER — OXYCODONE HYDROCHLORIDE AND ACETAMINOPHEN 5; 325 MG/1; MG/1
1 TABLET ORAL EVERY 6 HOURS PRN
Status: DISCONTINUED | OUTPATIENT
Start: 2021-02-18 | End: 2021-02-18

## 2021-02-18 RX ORDER — OXYCODONE HYDROCHLORIDE AND ACETAMINOPHEN 5; 325 MG/1; MG/1
1 TABLET ORAL EVERY 4 HOURS PRN
Status: DISCONTINUED | OUTPATIENT
Start: 2021-02-18 | End: 2021-02-18

## 2021-02-18 RX ORDER — HYDROMORPHONE HYDROCHLORIDE 1 MG/ML
0.5 INJECTION, SOLUTION INTRAMUSCULAR; INTRAVENOUS; SUBCUTANEOUS EVERY 4 HOURS PRN
Status: DISCONTINUED | OUTPATIENT
Start: 2021-02-18 | End: 2021-02-22 | Stop reason: HOSPADM

## 2021-02-18 RX ORDER — OXYCODONE HYDROCHLORIDE AND ACETAMINOPHEN 5; 325 MG/1; MG/1
1 TABLET ORAL EVERY 4 HOURS PRN
Status: DISCONTINUED | OUTPATIENT
Start: 2021-02-18 | End: 2021-02-22 | Stop reason: HOSPADM

## 2021-02-18 RX ADMIN — HYDROMORPHONE HYDROCHLORIDE 0.5 MG: 1 INJECTION, SOLUTION INTRAMUSCULAR; INTRAVENOUS; SUBCUTANEOUS at 07:29

## 2021-02-18 RX ADMIN — HYDROMORPHONE HYDROCHLORIDE 0.5 MG: 1 INJECTION, SOLUTION INTRAMUSCULAR; INTRAVENOUS; SUBCUTANEOUS at 04:18

## 2021-02-18 RX ADMIN — HYDROMORPHONE HYDROCHLORIDE 0.5 MG: 1 INJECTION, SOLUTION INTRAMUSCULAR; INTRAVENOUS; SUBCUTANEOUS at 02:02

## 2021-02-18 RX ADMIN — ONDANSETRON 4 MG: 2 INJECTION INTRAMUSCULAR; INTRAVENOUS at 02:00

## 2021-02-18 RX ADMIN — LISINOPRIL 10 MG: 10 TABLET ORAL at 04:19

## 2021-02-18 RX ADMIN — OXYCODONE HYDROCHLORIDE AND ACETAMINOPHEN 1 TABLET: 5; 325 TABLET ORAL at 08:59

## 2021-02-18 RX ADMIN — OXYCODONE HYDROCHLORIDE AND ACETAMINOPHEN 1 TABLET: 5; 325 TABLET ORAL at 12:58

## 2021-02-18 RX ADMIN — OXYCODONE AND ACETAMINOPHEN 2 TABLET: 5; 325 TABLET ORAL at 17:42

## 2021-02-18 RX ADMIN — LABETALOL HYDROCHLORIDE 10 MG: 5 INJECTION, SOLUTION INTRAVENOUS at 00:44

## 2021-02-18 RX ADMIN — ESTRADIOL 0.5 MG: 1 TABLET ORAL at 04:19

## 2021-02-18 RX ADMIN — POTASSIUM CHLORIDE 40 MEQ: 1500 TABLET, EXTENDED RELEASE ORAL at 09:00

## 2021-02-18 RX ADMIN — MEDROXYPROGESTERONE ACETATE 2.5 MG: 2.5 TABLET ORAL at 04:57

## 2021-02-18 ASSESSMENT — ENCOUNTER SYMPTOMS
MYALGIAS: 0
SORE THROAT: 0
VOMITING: 0
NAUSEA: 0
DIARRHEA: 0
BLOOD IN STOOL: 0
HEMOPTYSIS: 0
WHEEZING: 0
PALPITATIONS: 0
FALLS: 1
BLURRED VISION: 0
WEAKNESS: 0
CONSTIPATION: 0
CHILLS: 0
DIZZINESS: 0
COUGH: 0
ORTHOPNEA: 0
SHORTNESS OF BREATH: 0
FEVER: 0
LOSS OF CONSCIOUSNESS: 0
ABDOMINAL PAIN: 0
EYE PAIN: 0
WEIGHT LOSS: 0

## 2021-02-18 ASSESSMENT — PATIENT HEALTH QUESTIONNAIRE - PHQ9
SUM OF ALL RESPONSES TO PHQ9 QUESTIONS 1 AND 2: 0
2. FEELING DOWN, DEPRESSED, IRRITABLE, OR HOPELESS: NOT AT ALL
1. LITTLE INTEREST OR PLEASURE IN DOING THINGS: NOT AT ALL

## 2021-02-18 ASSESSMENT — LIFESTYLE VARIABLES
TOTAL SCORE: 0
ALCOHOL_USE: NO
TOTAL SCORE: 0
EVER HAD A DRINK FIRST THING IN THE MORNING TO STEADY YOUR NERVES TO GET RID OF A HANGOVER: NO
CONSUMPTION TOTAL: NEGATIVE
TOTAL SCORE: 0
HAVE YOU EVER FELT YOU SHOULD CUT DOWN ON YOUR DRINKING: NO
EVER FELT BAD OR GUILTY ABOUT YOUR DRINKING: NO
ON A TYPICAL DAY WHEN YOU DRINK ALCOHOL HOW MANY DRINKS DO YOU HAVE: 0
AVERAGE NUMBER OF DAYS PER WEEK YOU HAVE A DRINK CONTAINING ALCOHOL: 0
HAVE PEOPLE ANNOYED YOU BY CRITICIZING YOUR DRINKING: NO
DOES PATIENT WANT TO STOP DRINKING: NO
HOW MANY TIMES IN THE PAST YEAR HAVE YOU HAD 5 OR MORE DRINKS IN A DAY: 0

## 2021-02-18 ASSESSMENT — COGNITIVE AND FUNCTIONAL STATUS - GENERAL
MOBILITY SCORE: 22
STANDING UP FROM CHAIR USING ARMS: A LITTLE
SUGGESTED CMS G CODE MODIFIER MOBILITY: CJ
DRESSING REGULAR LOWER BODY CLOTHING: A LITTLE
DAILY ACTIVITIY SCORE: 23
SUGGESTED CMS G CODE MODIFIER DAILY ACTIVITY: CI
CLIMB 3 TO 5 STEPS WITH RAILING: A LITTLE

## 2021-02-18 ASSESSMENT — PAIN DESCRIPTION - PAIN TYPE
TYPE: ACUTE PAIN
TYPE: ACUTE PAIN

## 2021-02-18 NOTE — H&P
Hospital Medicine History & Physical Note    Date of Service  2/18/2021    Primary Care Physician  Rene Chang M.D.    Consultants  Ortho    Code Status  Full Code    Chief Complaint  Chief Complaint   Patient presents with   • GLF     pt was walkng from kitchen to living room and slipped on the tile, -loc, -thinners, denies hitting head. skin tear to elbow and complaint of right hip pain   • Hypertension     per ems pt was 200's sbp in route, on arrival pt 216/100. pt states she takes lisinopril.        History of Presenting Illness  80 y.o. female who presented 2/17/2021 status post fall at home.  Patient reports she was walking from her den to her kitchen when her boots caught on the carpet and she fell and landed on her right hip as well as her right elbow.  Patient reports that she sustained a laceration on her right elbow, as well as experienced severe right hip pain that is worse on movement and palpation.  He denies any head trauma, LOC, seizure-like activity, bowel/bladder incontinence.  She denies any prior history of falls in the past.  Patient does report that the pain hurts too much to ambulate currently.  Otherwise, no further complaints at this time.    ED: SBP elevated into the 200s to 190s.  WBC 15, sodium 133.  CT pelvis with right inferior/superior pubic rami fracture, right symphyseal fracture, and possible right sacral alar fracture anteriorly.  Patient also noted with hematoma adjacent to right bladder.  CT pelvis with and without contrast without bladder injury.  Right elbow XR unremarkable.  Patient was given fentanyl 50 mcg as well as a tetanus shot.    Review of Systems  Review of Systems   Constitutional: Negative for chills, fever and weight loss.   HENT: Negative for hearing loss and sore throat.    Eyes: Negative for blurred vision and pain.   Respiratory: Negative for cough, hemoptysis, shortness of breath and wheezing.    Cardiovascular: Negative for chest pain, palpitations,  orthopnea and leg swelling.   Gastrointestinal: Negative for abdominal pain, blood in stool, constipation, diarrhea, nausea and vomiting.   Genitourinary: Negative for dysuria and hematuria.   Musculoskeletal: Positive for falls and joint pain (Pelvic). Negative for myalgias.   Skin: Negative for rash.   Neurological: Negative for dizziness, loss of consciousness and weakness.       Past Medical History   has a past medical history of Arthritis (04/16/2020), CATARACT (2012), Hypertension (04/16/2020), MEDICAL HOME (11/07/12), OSTEOPOROSIS (6/18/2010), and Pain.    Surgical History   has a past surgical history that includes breast biopsy (Left, 2003); shoulder decompression arthroscopic (10/1/2009); shoulder arthroscopy w/ rotator cuff repair (10/1/2009); cataract phaco with iol (7/11/2012); cataract phaco with iol (7/25/2012); colonoscopy (2012); shoulder arthroscopy w/ rotator cuff repair (Right, 3/16/2017); shoulder decompression arthroscopic (3/16/2017); and dilation and curettage (N/A, 4/20/2020).     Family History  family history includes Arthritis in her mother.     Social History   reports that she has never smoked. She has never used smokeless tobacco. She reports previous alcohol use. She reports that she does not use drugs.    Allergies  Allergies   Allergen Reactions   • Nkda [No Known Drug Allergy]        Medications  Prior to Admission Medications   Prescriptions Last Dose Informant Patient Reported? Taking?   Cyanocobalamin (VITAMIN B-12 PO) 2/17/2021 at AM Patient Yes No   Sig: Take 500 mcg by mouth every day.   HYDROcodone/acetaminophen (NORCO)  MG Tab 2/17/2021 at 1630 Patient Yes No   Sig: Take 0.5 Tabs by mouth 2 times a day as needed for Severe Pain.   Multiple Minerals-Vitamins (ANNA-MAG-ZINC-D PO) 2/17/2021 at AM Patient Yes Yes   Sig: Take 1 tablet by mouth every day.   VITAMIN D PO 2/17/2021 at AM Patient Yes Yes   Sig: Take 1 capsule by mouth every day.   estradiol (ESTRACE) 0.5 MG  tablet 2/17/2021 at AM Patient Yes No   Sig: Take  by mouth every day. Take 1 tablet by mouth every day   lisinopril (PRINIVIL) 20 MG Tab 2/17/2021 at AM Patient No No   Sig: Take 1 Tab by mouth every day.   Patient taking differently: Take 10 mg by mouth every day.   medroxyPROGESTERone (PROVERA) 2.5 MG Tab 2/17/2021 at AM Patient Yes No   Sig: TK 1 T PO D AS LONG AS USING ESTROGEN      Facility-Administered Medications: None       Physical Exam  Temp:  [36.4 °C (97.6 °F)] 36.4 °C (97.6 °F)  Pulse:  [76-99] 87  Resp:  [20] 20  BP: (189-230)/() 210/94  SpO2:  [91 %-98 %] 91 %    Physical Exam  Vitals and nursing note reviewed.   Constitutional:       General: She is not in acute distress.     Appearance: Normal appearance. She is not ill-appearing.   HENT:      Mouth/Throat:      Mouth: Mucous membranes are moist.   Eyes:      Extraocular Movements: Extraocular movements intact.   Cardiovascular:      Rate and Rhythm: Normal rate and regular rhythm.      Pulses: Normal pulses.      Heart sounds: Normal heart sounds. No murmur. No gallop.    Pulmonary:      Effort: Pulmonary effort is normal. No respiratory distress.      Breath sounds: No wheezing, rhonchi or rales.   Abdominal:      General: Abdomen is flat. Bowel sounds are normal.      Palpations: Abdomen is soft.      Tenderness: There is no abdominal tenderness.   Musculoskeletal:         General: Tenderness (Pelvic region, worse on movement) present. No deformity. Normal range of motion.      Right lower leg: No edema.      Left lower leg: No edema.   Skin:     General: Skin is warm and dry.      Coloration: Skin is not jaundiced.      Findings: No rash.   Neurological:      General: No focal deficit present.      Mental Status: She is alert and oriented to person, place, and time.      Motor: No weakness.   Psychiatric:         Mood and Affect: Mood normal.         Laboratory:  Recent Labs     02/17/21 1953   WBC 15.0*   RBC 4.18*   HEMOGLOBIN 12.9    HEMATOCRIT 38.7   MCV 92.6   MCH 30.9   MCHC 33.3*   RDW 45.6   PLATELETCT 252   MPV 9.7     Recent Labs     02/17/21 1953   SODIUM 133*   POTASSIUM 3.6   CHLORIDE 100   CO2 21   GLUCOSE 105*   BUN 11   CREATININE 0.67   CALCIUM 8.8     Recent Labs     02/17/21 1953   ALTSGPT 21   ASTSGOT 26   ALKPHOSPHAT 65   TBILIRUBIN 0.3   GLUCOSE 105*     Recent Labs     02/17/21 1953   APTT 29.2   INR 1.04     No results for input(s): NTPROBNP in the last 72 hours.      No results for input(s): TROPONINT in the last 72 hours.    Imaging:  CT-PELVIS WITH & W/O   Final Result         1.  Right pelvic fractures as previously described.   2.  No contrast leak from the bladder to suggest bladder injury.      DX-ELBOW-COMPLETE 3+ RIGHT   Final Result         1.  No acute traumatic bony injury.         CT-PELVIS W/O PLUS RECONS   Final Result         1.  Right inferior and superior pubic rami fractures.   2.  Right symphyseal fracture   3.  Possible subtle right sacral alar fracture anteriorly   4.  Hematoma adjacent to the right bladder, recommend follow-up CT cystogram to exclude bladder injury.      These findings were discussed with the patient's clinician, Jack Summers, on 2/17/2021 8:25 PM.            Assessment/Plan:  I anticipate this patient will require at least two midnights for appropriate medical management, necessitating inpatient admission.    * Pelvic fracture (HCC)- (present on admission)  Assessment & Plan  Pelvic pain status post ground-level mechanical fall at home  No signs of seizure or syncope  No obvious head trauma  Right elbow laceration, sutured in the ED  CT imaging as above  -C/W analgesics for symptom management  -Bed rest until evaluated by orthopedics  -Holding chemical VTE due to hematoma  -Ortho (Dr. Telles) consulted by ED, will see in the morning    Pelvic hematoma in female- (present on admission)  Assessment & Plan  Pelvic hematoma adjacent to the right bladder noted on CT  imaging  Follow-up CT pelvis w/ and w/o Contrast without signs of bladder injury  -Hold chemical VTE  -Monitor CBC    HTN (hypertension)- (present on admission)  Assessment & Plan  SBP elevated 190s to 200s  Patient takes lisinopril 10 mg at home, states she has taken today's dose  Patient does report anxiety as well as pelvic pain  Likely combination of anxieties and pelvic pain  -C/W analgesics  -C/w home lisinopril 10mg qd  -Labetolol 10mg IV q4h PRN    Leukocytosis- (present on admission)  Assessment & Plan  Patient denies any obvious signs or symptoms of infection at this time  Possibly reactive due to severe pain  -F/U UA  -Monitor CBC    VTE: SCD, holding chemical VTE

## 2021-02-18 NOTE — ASSESSMENT & PLAN NOTE
Pelvic hematoma adjacent to the right bladder noted on CT imaging  Follow-up CT pelvis w/ and w/o Contrast without signs of bladder injury  -Hold chemical VTE  -Monitor CBC

## 2021-02-18 NOTE — CONSULTS
Date of Service: 02/18/21    CC: Right-sided pelvic fractures    HPI: Nafisa Hancock is a 80 y.o. female who presents with complaints of pain to right groin.  This started yesterday after a ground-level fall.  This was mechanical in nature.  She tripped going down a stair landing on her right side.  She also had a skin tear across the right elbow that was repaired in the emergency department.  The pain is 5/10 and is described as sharp.  The pain is made worse by range of motion to the area and made better by rest and immobilization.    PMH:   Past Medical History:   Diagnosis Date   • Arthritis 04/16/2020    hands, feet   • CATARACT 2012    bilateral phaco with IOL   • Hypertension 04/16/2020   • MEDICAL HOME 11/07/12   • OSTEOPOROSIS 6/18/2010   • Pain     right shoulder       PSH:   Past Surgical History:   Procedure Laterality Date   • DILATION AND CURETTAGE N/A 4/20/2020    Procedure: DILATION AND CURETTAGE;  Surgeon: Thomas Lucero M.D.;  Location: [facility];  Service: Gynecology   • SHOULDER ARTHROSCOPY W/ ROTATOR CUFF REPAIR Right 3/16/2017    Procedure: SHOULDER ARTHROSCOPY W/ ROTATOR CUFF REPAIR;  Surgeon: Darleen Meza M.D.;  Location: [facility];  Service:    • SHOULDER DECOMPRESSION ARTHROSCOPIC  3/16/2017    Procedure: SHOULDER DECOMPRESSION ARTHROSCOPIC - SUBACROMIAL, CUEVAS;  Surgeon: Darleen Meza M.D.;  Location: [facility];  Service:    • CATARACT PHACO WITH IOL  7/25/2012    Performed by TEJ DOUGLAS at SURGERY SAME DAY [facility]   • CATARACT PHACO WITH IOL  7/11/2012    Performed by TEJ DOUGLAS at SURGERY SAME DAY [facility]S   • COLONOSCOPY  2012   • SHOULDER DECOMPRESSION ARTHROSCOPIC  10/1/2009    Performed by DARLEEN MEZA at [facility]   • SHOULDER ARTHROSCOPY W/ ROTATOR CUFF REPAIR  10/1/2009    Performed by DARLEEN MEZA at [facility]   • BREAST BIOPSY Left 2003    negative       FH:  "  Family History   Problem Relation Age of Onset   • Arthritis Mother    • Non-contributory Neg Hx        SH:   Social History     Socioeconomic History   • Marital status:      Spouse name: Not on file   • Number of children: Not on file   • Years of education: Not on file   • Highest education level: Not on file   Occupational History   • Not on file   Tobacco Use   • Smoking status: Never Smoker   • Smokeless tobacco: Never Used   Substance and Sexual Activity   • Alcohol use: Not Currently     Alcohol/week: 0.0 oz   • Drug use: No   • Sexual activity: Not on file   Other Topics Concern   • Not on file   Social History Narrative   • Not on file     Social Determinants of Health     Financial Resource Strain:    • Difficulty of Paying Living Expenses:    Food Insecurity:    • Worried About Running Out of Food in the Last Year:    • Ran Out of Food in the Last Year:    Transportation Needs:    • Lack of Transportation (Medical):    • Lack of Transportation (Non-Medical):    Physical Activity:    • Days of Exercise per Week:    • Minutes of Exercise per Session:    Stress:    • Feeling of Stress :    Social Connections:    • Frequency of Communication with Friends and Family:    • Frequency of Social Gatherings with Friends and Family:    • Attends [employer] Services:    • Active Member of Clubs or Organizations:    • Attends Club or Organization Meetings:    • Marital Status:    Intimate Partner Violence:    • Fear of Current or Ex-Partner:    • Emotionally Abused:    • Physically Abused:    • Sexually Abused:        ROS: A 10 system review of systems was negative outside what was listed in the HPI    /80   Pulse 80   Temp 36.4 °C (97.5 °F) (Temporal)   Resp 16   Ht 1.651 m (5' 5\")   Wt 59 kg (130 lb)   SpO2 95%     Physical Exam:  General: AAOx3, NAD  HEENT: normocephalic, atraumatic  Psych: Normal mood and affect  Neck: supple, no pain to motion  Chest/Pulmonary: breathing unlabored, no audible " wheezing  Cardio: regular heart rate and rhythm  Neuro: sensation grossly intact to BUE and BLE, moving all four extremities  Skin: intact with no full thickness abrasions or lacerations  MSK: No tenderness to logrolling of the right hip palpation of the right knee leg or ankle.  Left lower extremity is pain-free to palpation as is the bilateral upper extremities.  This is mild to moderate right groin pain with any compression of the trochanters      Imaging and labs: X-rays and CT scan of the pelvis show fractures of the rami on the right side with some mild displacement.  There is likely a small anterior sacral fracture is nondisplaced    Assessment:   1. Multiple closed fractures of pelvis without disruption of pelvic ring, initial encounter ([facility])         We discussed the diagnosis and findings with the patient at length.  We reviewed possible non operative and operative interventions and the risks and benefits of these.  Recommended nonoperative treatment of his pelvic fractures.  She had a chance to ask questions and all of these were answered to her satisfaction.        Plan:  Weightbearing as tolerated and range of motion as tolerated  Mobilize  PT/OT  Follow-up in clinic in 4 weeks

## 2021-02-18 NOTE — ASSESSMENT & PLAN NOTE
SBP elevated 190s to 200s on admission. Improved.  Patient takes lisinopril 10 mg at home, states she has taken today's dose  Patient does report anxiety as well as pelvic pain  Likely combination of anxieties and pelvic pain  -C/W analgesics  -C/w home lisinopril 10mg qd  -Labetolol 10mg IV q4h PRN

## 2021-02-18 NOTE — ED NOTES
Pt requesting PRN pain meds d/t being uncomfortable on stretcher. Pt medicated with Dilaudid and Zofran per MAR and tolerated well. Will continue to monitor.

## 2021-02-18 NOTE — ED PROVIDER NOTES
ED Provider Note    CHIEF COMPLAINT  Chief Complaint   Patient presents with   • GLF     pt was walkng from kitchen to living room and slipped on the tile, -loc, -thinners, denies hitting head. skin tear to elbow and complaint of right hip pain   • Hypertension     per ems pt was 200's sbp in route, on arrival pt 216/100. pt states she takes lisinopril.        HPI  Nafisa Hancock is a 80 y.o. female who presents with groin and pelvic pain.  The patient was walking to the living room from the kitchen when she slipped on some tile.  She states she fell in the right pelvic region.  The patient presents with pelvic pain.  She also struck her right elbow.  She does have an approximate 7 cm laceration to the right elbow and some discomfort in this region.  She states she did not strike her head nor did she have any neck nor back pain.  She does not have any chest pain or difficulty with breathing.  She is unaware of the last time she had a tetanus shot.  She cannot ambulate due to the pain in the right hip region.    REVIEW OF SYSTEMS  See HPI for further details. All other systems are negative.     PAST MEDICAL HISTORY  Past Medical History:   Diagnosis Date   • Arthritis 04/16/2020    hands, feet   • Hypertension 04/16/2020   • MEDICAL HOME 11/07/12   • CATARACT 2012    bilateral phaco with IOL   • OSTEOPOROSIS 6/18/2010   • Pain     right shoulder       FAMILY HISTORY  [unfilled]    SOCIAL HISTORY  Social History     Socioeconomic History   • Marital status:      Spouse name: Not on file   • Number of children: Not on file   • Years of education: Not on file   • Highest education level: Not on file   Occupational History   • Not on file   Tobacco Use   • Smoking status: Never Smoker   • Smokeless tobacco: Never Used   Substance and Sexual Activity   • Alcohol use: Not Currently     Alcohol/week: 0.0 oz   • Drug use: No   • Sexual activity: Not on file   Other Topics Concern   • Not on file   Social History  Narrative   • Not on file     Social Determinants of Health     Financial Resource Strain:    • Difficulty of Paying Living Expenses:    Food Insecurity:    • Worried About Running Out of Food in the Last Year:    • Ran Out of Food in the Last Year:    Transportation Needs:    • Lack of Transportation (Medical):    • Lack of Transportation (Non-Medical):    Physical Activity:    • Days of Exercise per Week:    • Minutes of Exercise per Session:    Stress:    • Feeling of Stress :    Social Connections:    • Frequency of Communication with Friends and Family:    • Frequency of Social Gatherings with Friends and Family:    • Attends [employer] Services:    • Active Member of Clubs or Organizations:    • Attends Club or Organization Meetings:    • Marital Status:    Intimate Partner Violence:    • Fear of Current or Ex-Partner:    • Emotionally Abused:    • Physically Abused:    • Sexually Abused:        SURGICAL HISTORY  Past Surgical History:   Procedure Laterality Date   • DILATION AND CURETTAGE N/A 4/20/2020    Procedure: DILATION AND CURETTAGE;  Surgeon: Thomas Lucero M.D.;  Location: [facility];  Service: Gynecology   • SHOULDER ARTHROSCOPY W/ ROTATOR CUFF REPAIR Right 3/16/2017    Procedure: SHOULDER ARTHROSCOPY W/ ROTATOR CUFF REPAIR;  Surgeon: Darleen Meza M.D.;  Location: [facility];  Service:    • SHOULDER DECOMPRESSION ARTHROSCOPIC  3/16/2017    Procedure: SHOULDER DECOMPRESSION ARTHROSCOPIC - SUBACROMIAL, CUEVAS;  Surgeon: Darleen Meza M.D.;  Location: [facility];  Service:    • CATARACT PHACO WITH IOL  7/25/2012    Performed by TEJ DOUGLAS at SURGERY SAME DAY [facility]S   • CATARACT PHACO WITH IOL  7/11/2012    Performed by TEJ DOUGLAS at SURGERY SAME DAY [facility]   • COLONOSCOPY  2012   • SHOULDER DECOMPRESSION ARTHROSCOPIC  10/1/2009    Performed by DARLEEN MEZA at SURGERY [facility]   • SHOULDER ARTHROSCOPY W/ ROTATOR CUFF  "REPAIR  10/1/2009    Performed by DARLEEN EAGLE at SURGERY [facility]   • BREAST BIOPSY Left 2003    negative       CURRENT MEDICATIONS  Home Medications     Reviewed by Melisa Donnelly R.N. (Registered Nurse) on 02/17/21 at 1942  Med List Status: Partial   Medication Last Dose Status   acetaminophen (TYLENOL) 500 MG Tab  Active   Calcium Carbonate-Vitamin D (CALCIUM + D PO)  Active   Cyanocobalamin (VITAMIN B-12 PO)  Active   estradiol (ESTRACE) 0.5 MG tablet  Active   HYDROcodone/acetaminophen (NORCO)  MG Tab  Active   lisinopril (PRINIVIL) 20 MG Tab  Active   medroxyPROGESTERone (PROVERA) 2.5 MG Tab  Active   Multiple Vitamin (MULTIVITAMINS PO)  Active   Omega-3 Fatty Acids (OMEGA-3 FISH OIL PO)  Active                ALLERGIES  Allergies   Allergen Reactions   • Nkda [No Known Drug Allergy]        PHYSICAL EXAM  VITAL SIGNS: Ht 1.651 m (5' 5\")   Wt 59 kg (130 lb)   LMP 01/13/2017 (Exact Date) Comment: Due to hormone therapy to prevent cancer  BMI 21.63 kg/m²       Constitutional: In acute distress, Non-toxic appearance.   HENT: Normocephalic, Atraumatic, Bilateral external ears normal, Oropharynx moist, No oral exudates, Nose normal.   Eyes: PERRLA, EOMI, Conjunctiva normal, No discharge.   Neck: Normal range of motion, No tenderness, Supple, No stridor.   Lymphatic: No lymphadenopathy noted.   Cardiovascular: Normal heart rate, Normal rhythm, No murmurs, No rubs, No gallops.   Thorax & Lungs: Normal breath sounds, No respiratory distress, No wheezing, No chest tenderness.   Abdomen: Bowel sounds normal, Soft, No tenderness, No masses, No pulsatile masses.   Skin: 7 cm laceration to the lateral aspect of the right elbow.   Back: No tenderness, No CVA tenderness.   Extremities: Pain to the right side of her pelvis with no pelvic instability.  The patient does not have any shortening of the right lower extremity nor pain with internal nor external rotation.  The laceration described above, " tenderness throughout the right elbow without obvious deformities.  Extremities otherwise intact distal pulses, No edema, No tenderness, No cyanosis, No clubbing.   Neurologic: GCS of 15.   Psychiatric: Affect normal, Judgment normal, Mood normal.       RADIOLOGY/  CT-PELVIS WITH & W/O   Final Result         1.  Right pelvic fractures as previously described.   2.  No contrast leak from the bladder to suggest bladder injury.      DX-ELBOW-COMPLETE 3+ RIGHT   Final Result         1.  No acute traumatic bony injury.         CT-PELVIS W/O PLUS RECONS   Final Result         1.  Right inferior and superior pubic rami fractures.   2.  Right symphyseal fracture   3.  Possible subtle right sacral alar fracture anteriorly   4.  Hematoma adjacent to the right bladder, recommend follow-up CT cystogram to exclude bladder injury.      These findings were discussed with the patient's clinician, Jack Summers, on 2/17/2021 8:25 PM.        PROCEDURES laceration repair  Lidocaine with epinephrine was utilized for a local anesthetic.  I then irrigated the wound with saline profusely.  I then placed multiple staples for primary closure.    COURSE & MEDICAL DECISION MAKING  Pertinent Labs & Imaging studies reviewed. (See chart for details)  This an 80-year-old female who presents after a fall.  She does have significant pain in the right pelvic and right elbow region.  X-ray of the elbow was negative.  She did have primary closure of the large laceration.  She also received tetanus prophylaxis.  As for the pelvic injury CT scan does show multiple pelvic fractures including right inferior and superior pubic rami fractures, right symphyseal fracture, and possible right sacral alar fracture.  There was a hematoma noted and a CT cystogram was performed to rule out a bladder injury which was negative.  I suspect the hematoma is from the pelvic fractures.  She has been hypertensive throughout her stay.  She has received fentanyl for pain  control.  We will meet the patient to the trauma services with orthopedics in consultation.    FINAL IMPRESSION  1.  Multiple pelvic fractures  2.  7 cm right elbow laceration  3.  Hypertension    Disposition  The patient will be admitted in stable condition         Electronically signed by: Jack Summers M.D., 2/17/2021 7:56 PM    Of note I spoke with the trauma surgeon Dr. Del Real and he states he does not need to be involved with the patient.  The patient be admitted to the hospitalist with orthopedics in consultation

## 2021-02-18 NOTE — FACE TO FACE
Face to Face Supporting Documentation - Home Health    The encounter with this patient was in whole or in part the primary reason for home health admission.    Date of encounter:   Patient:                    MRN:                       YOB: 2021  Nafisa Hancock  [id number]  1940     Home health to see patient for:  Skilled Nursing care for assessment, interventions & education, Physical Therapy evaluation and treatment and Occupational therapy evaluation and treatment    Skilled need for:  New Onset Medical Diagnosis Pelvic fracture    Skilled nursing interventions to include:  Comment: Assessment, intervention and education    Homebound status evidenced by:  Needs the assistance of another person in order to leave the home. Leaving home requires a considerable and taxing effort. There is a normal inability to leave the home.    Community Physician to provide follow up care: Rene Chang M.D.     Optional Interventions? No      I certify the face to face encounter for this home health care referral meets the CMS requirements and the encounter/clinical assessment with the patient was, in whole, or in part, for the medical condition(s) listed above, which is the primary reason for home health care. Based on my clinical findings: the service(s) are medically necessary, support the need for home health care, and the homebound criteria are met.  I certify that this patient has had a face to face encounter by myself.  James Ramires D.O. - NPI: [id number]

## 2021-02-18 NOTE — ED NOTES
Medicated pt per MAR. Covid swab collected and sent to lab. Pt denies further needs. Call light within reach.

## 2021-02-18 NOTE — ED NOTES
Called Ortho Charge RN to let him know that pt has 3 SBP readings below 200. He states they are able to take the pt now and will be assigning a bed shortly. Will continue to monitor pt while awaiting assignment and transport upstairs.

## 2021-02-18 NOTE — ED TRIAGE NOTES
.  Chief Complaint   Patient presents with   • GLF     pt was walkng from kitchen to living room and slipped on the tile, -loc, -thinners, denies hitting head. skin tear to elbow and complaint of right hip pain   • Hypertension     per ems pt was 200's sbp in route, on arrival pt 216/100. pt states she takes lisinopril.        Pt BIBA for above complaint, dressing to right elbow, no noted shortening of right leg. Pt received 1mg versed and 150mg fentanyl in route. Pt placed in gown and on monitor. ERP to see.

## 2021-02-18 NOTE — ASSESSMENT & PLAN NOTE
Resolved.  Patient denies any obvious signs or symptoms of infection at this time  Possibly reactive due to severe pain  -F/U UA  -Monitor CBC

## 2021-02-18 NOTE — PROGRESS NOTES
xrays show stable pelvic rami fractures, will see patient later this AM    PLAN  Non op  WBAT  Mobilize  4 week f/u in clinic if needed

## 2021-02-18 NOTE — ASSESSMENT & PLAN NOTE
Pelvic pain status post ground-level mechanical fall at home  No signs of seizure or syncope  No obvious head trauma  Right elbow laceration, sutured in the ED  CT imaging as above  -C/W analgesics for symptom management  -Holding chemical VTE due to hematoma  -Ortho (Dr. Telles) consulted by ED, commended nonoperative management  -Needs ongoing PT/OT

## 2021-02-18 NOTE — ED NOTES
Report called to CECILIO Dwakins. Pt will be taken upstairs by Transport Tech via gurney. Pt is awake and alert, talking to staff, in no apparent distress at time of transfer. Pt's paperwork and belongings sent upstairs with pt and Tech.

## 2021-02-18 NOTE — PROGRESS NOTES
Report received from ER nurse. Assumed care of patient. Pt resting without any sign of distress. A&Ox4, on RA. POC reviewed and white board updated, Call light within reach, Bed locked in lowest position.   Pt /112. Parameter goal <160. Too early to give labetalol but will recheck BP shortly since pt had recently been transferred. Will give scheduled 0600 lisinopril early and recheck bp later pt has history of bottoming out from repeated PRN BP meds.   Pt states pain is tolerable after PRN medication but instructed to call if pain increases. Pain is mostly in back and R hip  Pt is still in stretcher, awaiting bed to transfer pt over to and will do 2 RN skin check.   Pt has fractures seen on CT scan and on bed rest. Sandoval in place. Pt has lac on R elbow and some scattered tears and bruises. Ortho to see in AM   Pt instructed how to call and call light in hands. Pt aware not to try to get OOB

## 2021-02-18 NOTE — ED NOTES
Med rec complete via interview with pt at bedside and reconcile outside meds. Allergies reviewed. Pt denies antibiotic use in past 14 days.

## 2021-02-18 NOTE — PROGRESS NOTES
2 RN skin check performed.   Pt coccyx intact- mepilex applied   R elbow lac w/ dsg in place   Scattered bruising and fragile skin   Heels reddened  but blanchable. rest of the patients skin is intact with no pressure sores or other open wounds.

## 2021-02-18 NOTE — PROGRESS NOTES
Pt aaox4. Pt c/o R hip pain, Percocet given PRN with +results. +BS. Voiding w/o difficulty. WBAT to BLE, 2+ pulses, denies N/T, skin warm, cap refill < 3 sec. Reviewed poc with pt-verbalized understanding. Pt has not been OOB d/t pain with movement. PT/OT evals pending. Call light in reach.

## 2021-02-18 NOTE — ED NOTES
Pt medicated with PRN Labetolol per MAR. Will continue to monitor pt and will notify floor when SBP is <200 consistently.    Pt declines need for PRN pain meds at this time.

## 2021-02-18 NOTE — PROGRESS NOTES
Patient seen and examined at bedside.  She reports that her pain is well controlled we are switching her to p.o. pain medications.  Patient is still not very clear how she fell she reports that she misstepped while walking on a rug but denies any prodromal symptoms.  She reports that she felt like she had raise her foot high enough when she was walking    ROS: Denies chest pain, shortness of breath, nausea, vomiting, abdominal pain, difficulty urinating, extremity weakness.  She does continue to report right hip pain.  Which is improved    Physical examination:   General: No acute distress lying comfortably in bed.  HEENT: Head is atraumatic normocephalic sclera clear  Lungs: Clear to auscultation, no wheezes or rales  Heart: Regular rate and rhythm no murmurs  Abdomen: Soft nontender bowel sounds present all 4 quadrants no guarding  Musculoskeletal: Right hip tenderness, moves all extremities no swelling.    Assessment and plan: Continue current plan will transition the patient from IV pain meds to p.o. pain meds with IV Dilaudid for breakthrough pain.  Physical therapy and Occupational Therapy have been ordered.  Home health ordered.

## 2021-02-18 NOTE — ED NOTES
Received report from Eduard HERANNDES and Maria E HERNANDES. Pt is currently resting in bed with even and unlabored breaths, in no apparent distress at this time. Pt updated regarding status waiting for bed assignment upstairs and demonstrated understanding. Pt also given portable phone to call SO. Will continue to monitor pt while awaiting bed assignment and/or further orders.

## 2021-02-19 ENCOUNTER — PATIENT OUTREACH (OUTPATIENT)
Dept: HEALTH INFORMATION MANAGEMENT | Facility: OTHER | Age: 81
End: 2021-02-19

## 2021-02-19 LAB
ANION GAP SERPL CALC-SCNC: 13 MMOL/L (ref 7–16)
BASOPHILS # BLD AUTO: 0.4 % (ref 0–1.8)
BASOPHILS # BLD: 0.04 K/UL (ref 0–0.12)
BUN SERPL-MCNC: 10 MG/DL (ref 8–22)
CALCIUM SERPL-MCNC: 9.3 MG/DL (ref 8.5–10.5)
CHLORIDE SERPL-SCNC: 100 MMOL/L (ref 96–112)
CO2 SERPL-SCNC: 22 MMOL/L (ref 20–33)
CREAT SERPL-MCNC: 0.59 MG/DL (ref 0.5–1.4)
EOSINOPHIL # BLD AUTO: 0.09 K/UL (ref 0–0.51)
EOSINOPHIL NFR BLD: 0.9 % (ref 0–6.9)
ERYTHROCYTE [DISTWIDTH] IN BLOOD BY AUTOMATED COUNT: 46 FL (ref 35.9–50)
GLUCOSE SERPL-MCNC: 113 MG/DL (ref 65–99)
HCT VFR BLD AUTO: 38.8 % (ref 37–47)
HGB BLD-MCNC: 12.7 G/DL (ref 12–16)
IMM GRANULOCYTES # BLD AUTO: 0.05 K/UL (ref 0–0.11)
IMM GRANULOCYTES NFR BLD AUTO: 0.5 % (ref 0–0.9)
LYMPHOCYTES # BLD AUTO: 1.48 K/UL (ref 1–4.8)
LYMPHOCYTES NFR BLD: 14.5 % (ref 22–41)
MCH RBC QN AUTO: 30.2 PG (ref 27–33)
MCHC RBC AUTO-ENTMCNC: 32.7 G/DL (ref 33.6–35)
MCV RBC AUTO: 92.2 FL (ref 81.4–97.8)
MONOCYTES # BLD AUTO: 0.73 K/UL (ref 0–0.85)
MONOCYTES NFR BLD AUTO: 7.1 % (ref 0–13.4)
NEUTROPHILS # BLD AUTO: 7.83 K/UL (ref 2–7.15)
NEUTROPHILS NFR BLD: 76.6 % (ref 44–72)
NRBC # BLD AUTO: 0 K/UL
NRBC BLD-RTO: 0 /100 WBC
PLATELET # BLD AUTO: 239 K/UL (ref 164–446)
PMV BLD AUTO: 10.1 FL (ref 9–12.9)
POTASSIUM SERPL-SCNC: 4.1 MMOL/L (ref 3.6–5.5)
RBC # BLD AUTO: 4.21 M/UL (ref 4.2–5.4)
SODIUM SERPL-SCNC: 135 MMOL/L (ref 135–145)
WBC # BLD AUTO: 10.2 K/UL (ref 4.8–10.8)

## 2021-02-19 PROCEDURE — A9270 NON-COVERED ITEM OR SERVICE: HCPCS | Performed by: STUDENT IN AN ORGANIZED HEALTH CARE EDUCATION/TRAINING PROGRAM

## 2021-02-19 PROCEDURE — 97161 PT EVAL LOW COMPLEX 20 MIN: CPT

## 2021-02-19 PROCEDURE — 700101 HCHG RX REV CODE 250: Performed by: STUDENT IN AN ORGANIZED HEALTH CARE EDUCATION/TRAINING PROGRAM

## 2021-02-19 PROCEDURE — 99233 SBSQ HOSP IP/OBS HIGH 50: CPT | Performed by: INTERNAL MEDICINE

## 2021-02-19 PROCEDURE — 97166 OT EVAL MOD COMPLEX 45 MIN: CPT

## 2021-02-19 PROCEDURE — 85025 COMPLETE CBC W/AUTO DIFF WBC: CPT

## 2021-02-19 PROCEDURE — 700102 HCHG RX REV CODE 250 W/ 637 OVERRIDE(OP): Performed by: STUDENT IN AN ORGANIZED HEALTH CARE EDUCATION/TRAINING PROGRAM

## 2021-02-19 PROCEDURE — 80048 BASIC METABOLIC PNL TOTAL CA: CPT

## 2021-02-19 PROCEDURE — 770006 HCHG ROOM/CARE - MED/SURG/GYN SEMI*

## 2021-02-19 PROCEDURE — 36415 COLL VENOUS BLD VENIPUNCTURE: CPT

## 2021-02-19 RX ADMIN — LABETALOL HYDROCHLORIDE 10 MG: 5 INJECTION, SOLUTION INTRAVENOUS at 19:58

## 2021-02-19 RX ADMIN — ESTRADIOL 0.5 MG: 1 TABLET ORAL at 04:59

## 2021-02-19 RX ADMIN — MEDROXYPROGESTERONE ACETATE 2.5 MG: 2.5 TABLET ORAL at 05:01

## 2021-02-19 RX ADMIN — OXYCODONE AND ACETAMINOPHEN 2 TABLET: 5; 325 TABLET ORAL at 09:56

## 2021-02-19 RX ADMIN — DOCUSATE SODIUM 50 MG AND SENNOSIDES 8.6 MG 2 TABLET: 8.6; 5 TABLET, FILM COATED ORAL at 05:01

## 2021-02-19 RX ADMIN — OXYCODONE AND ACETAMINOPHEN 2 TABLET: 5; 325 TABLET ORAL at 19:48

## 2021-02-19 RX ADMIN — OXYCODONE AND ACETAMINOPHEN 2 TABLET: 5; 325 TABLET ORAL at 01:03

## 2021-02-19 RX ADMIN — LISINOPRIL 10 MG: 10 TABLET ORAL at 05:00

## 2021-02-19 RX ADMIN — OXYCODONE AND ACETAMINOPHEN 2 TABLET: 5; 325 TABLET ORAL at 05:00

## 2021-02-19 SDOH — ECONOMIC STABILITY: FOOD INSECURITY: WITHIN THE PAST 12 MONTHS, YOU WORRIED THAT YOUR FOOD WOULD RUN OUT BEFORE YOU GOT MONEY TO BUY MORE.: NEVER TRUE

## 2021-02-19 SDOH — ECONOMIC STABILITY: INCOME INSECURITY: HOW HARD IS IT FOR YOU TO PAY FOR THE VERY BASICS LIKE FOOD, HOUSING, MEDICAL CARE, AND HEATING?: NOT HARD AT ALL

## 2021-02-19 SDOH — ECONOMIC STABILITY: FOOD INSECURITY: WITHIN THE PAST 12 MONTHS, THE FOOD YOU BOUGHT JUST DIDN'T LAST AND YOU DIDN'T HAVE MONEY TO GET MORE.: NEVER TRUE

## 2021-02-19 SDOH — ECONOMIC STABILITY: TRANSPORTATION INSECURITY
IN THE PAST 12 MONTHS, HAS LACK OF TRANSPORTATION KEPT YOU FROM MEETINGS, WORK, OR FROM GETTING THINGS NEEDED FOR DAILY LIVING?: NO

## 2021-02-19 SDOH — ECONOMIC STABILITY: TRANSPORTATION INSECURITY
IN THE PAST 12 MONTHS, HAS THE LACK OF TRANSPORTATION KEPT YOU FROM MEDICAL APPOINTMENTS OR FROM GETTING MEDICATIONS?: NO

## 2021-02-19 ASSESSMENT — COGNITIVE AND FUNCTIONAL STATUS - GENERAL
TURNING FROM BACK TO SIDE WHILE IN FLAT BAD: UNABLE
DRESSING REGULAR LOWER BODY CLOTHING: A LOT
SUGGESTED CMS G CODE MODIFIER DAILY ACTIVITY: CJ
CLIMB 3 TO 5 STEPS WITH RAILING: A LOT
MOVING FROM LYING ON BACK TO SITTING ON SIDE OF FLAT BED: UNABLE
DAILY ACTIVITIY SCORE: 21
MOBILITY SCORE: 11
STANDING UP FROM CHAIR USING ARMS: A LITTLE
MOVING TO AND FROM BED TO CHAIR: UNABLE
WALKING IN HOSPITAL ROOM: A LITTLE
SUGGESTED CMS G CODE MODIFIER MOBILITY: CL
HELP NEEDED FOR BATHING: A LITTLE

## 2021-02-19 ASSESSMENT — ACTIVITIES OF DAILY LIVING (ADL): TOILETING: INDEPENDENT

## 2021-02-19 ASSESSMENT — GAIT ASSESSMENTS
GAIT LEVEL OF ASSIST: MODERATE ASSIST
ASSISTIVE DEVICE: FRONT WHEEL WALKER
DISTANCE (FEET): 6

## 2021-02-19 ASSESSMENT — PAIN DESCRIPTION - PAIN TYPE
TYPE: ACUTE PAIN

## 2021-02-19 NOTE — CARE PLAN
Problem: Communication  Goal: The ability to communicate needs accurately and effectively will improve  Outcome: PROGRESSING AS EXPECTED  Note: Patient able to communicate needs. All questions answered at this time.      Problem: Safety  Goal: Will remain free from falls  Outcome: PROGRESSING AS EXPECTED  Note: Educated on fall risk and ensured fall precautions in place. Bed in lowest position, treaded socks in place, call light in reach, room free of clutter.

## 2021-02-19 NOTE — DISCHARGE INSTRUCTIONS
Discharge Instructions    Discharged to home by car with relative. Discharged via wheelchair, hospital escort: Yes.  Special equipment needed: Walker    Be sure to schedule a follow-up appointment with your primary care doctor or any specialists as instructed.   Aug 06, 2021 2:00 PM   Established Patient with Rene Chang M.D.   [facility] [address]   [address]   [place] [id number]   [phone]      Discharge Plan:   Influenza Vaccine Indication: Indicated: 65 years and older    I understand that a diet low in cholesterol, fat, and sodium is recommended for good health. Unless I have been given specific instructions below for another diet, I accept this instruction as my diet prescription.   Other diet: regular    Special Instructions: Discharge instructions for the Orthopedic Patient    Follow up with Primary Care Physician within 2 weeks of discharge to home, regarding:  Review of medications and diagnostic testing.  Surveillance for medical complications.  Workup and treatment of osteoporosis, if appropriate.     -Is this a Hip/Knee/Shoulder Joint Replacement patient? No    -Is this patient being discharged with medication to prevent blood clots?  No    · Is patient discharged on Warfarin / Coumadin?   No     Depression / Suicide Risk    As you are discharged from this [facility], it is important to learn how to keep safe from harming yourself.    Recognize the warning signs:  · Abrupt changes in personality, positive or negative- including increase in energy   · Giving away possessions  · Change in eating patterns- significant weight changes-  positive or negative  · Change in sleeping patterns- unable to sleep or sleeping all the time   · Unwillingness or inability to communicate  · Depression  · Unusual sadness, discouragement and loneliness  · Talk of wanting to die  · Neglect of personal appearance   · Rebelliousness- reckless behavior  · Withdrawal from  people/activities they love  · Confusion- inability to concentrate     If you or a loved one observes any of these behaviors or has concerns about self-harm, here's what you can do:  · Talk about it- your feelings and reasons for harming yourself  · Remove any means that you might use to hurt yourself (examples: pills, rope, extension cords, firearm)  · Get professional help from the community (Mental Health, Substance Abuse, psychological counseling)  · Do not be alone:Call your Safe Contact- someone whom you trust who will be there for you.  · Call your local CRISIS HOTLINE 676-8764 or 571-008-5380  · Call your local Children's Mobile Crisis Response Team [place] (946) 386-0630 or www.Nivela  · Call the toll free National Suicide Prevention Hotlines   · National Suicide Prevention Lifeline 412-086-WHZB (8168)  · National Hope Line Network 800-SUICIDE (353-7474)

## 2021-02-19 NOTE — PROGRESS NOTES
Community Health Worker Intake  • Social determinates of health intake complete.   • Identified no barriers.  • Contact information provided to Nafisa Hancock.  • Has PCP appointment scheduled for 8/6 at 2pm.  • Accepted Meds-To-Beds.  • Inpatient assessment completed.    CHFLORINDA Irvin met with pt bedside to introduce Geriatrics Specialty Care services. Pt declined [facility]SC at this time. Notified care coordinator Shavonne Garcia via email. PCP appt is in August. Pt declined for CHW to schedule h/f. Educated pt on the importance of follow ups. Pt reports will contact PCP when she needs to. Pt reports no transportation issues getting to appts. Pt is an active . Pt reports no financial barriers to housing, food, and medical care. Pt is confident in managing her health after d/c. Pt reports no other needs at this time.     Plan: Follow up call post discharge.

## 2021-02-19 NOTE — DISCHARGE PLANNING
Received Choice form at [phone]  Agency/Facility Name: [facility]  Referral sent per Choice form @ [phone]

## 2021-02-19 NOTE — DISCHARGE PLANNING
Will there be a physiatry consult, per the notes therapy recommended it and are concerned for her going home. Thank you

## 2021-02-19 NOTE — PROGRESS NOTES
Pt unable to get OB to chair d/t pain- notified MD order received to increase Percocet from Q6 hours to Q 4 hours. PT/OT to see pt tomorrow morning.

## 2021-02-19 NOTE — THERAPY
Physical Therapy   Initial Evaluation     Patient Name: Nafisa Hancock  Age:  80 y.o., Sex:  female  Medical Record #: [id number]  Today's Date: 2/19/2021     Precautions: Fall Risk, Weight Bearing As Tolerated Right Lower Extremity    Assessment  Patient is 80 y.o. female admitted following GLF with subsequent multiple closed R pelvic fxs; Non Op, WBAT R LE. Pt is very eager to DC home, reports good support of SO and neighbors. Today, pt required Mod A to reach EOB with HOB fully elevated. Demonstrated posterior lean upon standing from EOB requiring Min A to support and self correct. Pt tolerated ambulation x6 ft from EOB to sink to bedside chair with extra time and heavy cues to sequence gait. Reported very minimal to no pain while static standing at sink. Currently, pt is not functioning at a level capable of DC home. Recommending physiatry consult to assist with DC planning.     PT to follow while in house as pt is eager to return home and may be able to do so with continued mobility in acute setting, encouraged pt to be up to chair for all meals and ambulate with nursing to bathroom as tolerated.     Plan    Recommend Physical Therapy 4 times per week until therapy goals are met for the following treatments:  Bed Mobility, Equipment, Gait Training, Neuro Re-Education / Balance, Self Care/Home Evaluation, Stair Training, Therapeutic Activities and Therapeutic Exercises    DC Equipment Recommendations: Front-Wheel Walker, Unable to determine at this time  Discharge Recommendations: Currently, recommend post-acute placement for additional physical therapy services prior to discharge home  (PHYSIATRY CONSULT)  Pt would prefer to DC home with home health follow up, so PT will continue to assess ability to return home safely with SO support.        02/19/21 1120   Precautions   Precautions Fall Risk;Weight Bearing As Tolerated Right Lower Extremity   Comments multiple closed R pelvic fxs, Non Op   Vitals   O2  Delivery Device None - Room Air   Pain 0 - 10 Group   Therapist Pain Assessment During Activity;Nurse Notified  (R hip pain not rated, reluctant to WB and inc pain)   Prior Living Situation   Prior Services Home-Independent   Housing / Facility 1 Story House   Steps Into Home 1   Steps In Home 1   Equipment Owned None   Lives with - Patient's Self Care Capacity Significant Other  (Ryley)   Comments Pt reports SO is able to assist upon DC home and has good support of neighbors   Prior Level of Functional Mobility   Bed Mobility Independent   Transfer Status Independent   Ambulation Independent   Distance Ambulation (Feet)   (community)   Assistive Devices Used None   Stairs Independent   Comments reports she is fairly active at baseline but does have B RTC tears   Cognition    Cognition / Consciousness X   Level of Consciousness Alert   Comments Somewhat delayed responses, focused on getting home   Active ROM Lower Body    Active ROM Lower Body  X   Comments R LE limited by pain, pt guarding   Strength Lower Body   Lower Body Strength  X   Comments as above   Balance Assessment   Sitting Balance (Static) Fair +   Sitting Balance (Dynamic) Fair +   Standing Balance (Static) Fair -   Standing Balance (Dynamic) Poor +   Weight Shift Sitting Fair   Weight Shift Standing Fair   Comments w/ FWW, cues to use UE's to offload R LE in stance   Gait Analysis   Gait Level Of Assist Moderate Assist   Assistive Device Front Wheel Walker   Distance (Feet) 6   # of Times Distance was Traveled 1   Deviation Step To;Bradykinetic;Decreased Toe Off;Decreased Heel Strike;Shuffled Gait   # of Stairs Climbed 0   Weight Bearing Status WBAT R LE   Comments required significant time to complete very minimal ambulation from EOB to sink to bedside chair. heavy verbal cues to sequence use of FWW   Bed Mobility    Supine to Sit Moderate Assist  (with HOB fully elevated)   Sit to Supine   (seated in chair at end of session)   Scooting Minimal  Assist   Comments introduced use of L LE to assist R LE OOB. Still requires considerable assist   Functional Mobility   Sit to Stand Minimal Assist   Bed, Chair, Wheelchair Transfer Minimal Assist   Transfer Method Stand Step   Comments pt demonstrated posterior lean against bed upon standing, required facilitation to anteriorly weight shift to improve balance    Patient / Family Goals    Patient / Family Goal #1 to return home   Short Term Goals    Short Term Goal # 1 pt will perform supine <> sit with HOB flat, no railing and SPV in 6 vistis   Short Term Goal # 2 pt will perform sit <> stand and functional transfers with LRAD and SPV to improve mobility independence in 6 visits   Short Term Goal # 3 pt will ambulate > 75 ft with LRAD and SPV to access home environment in 6 visits   Short Term Goal # 4 pt will negotiate 1 step with LRAD and Min A to access home in 6 visits   Problem List    Problems Pain;Impaired Bed Mobility;Impaired Transfers;Impaired Ambulation;Functional Strength Deficit;Impaired Balance;Decreased Activity Tolerance   Anticipated Discharge Equipment and Recommendations   DC Equipment Recommendations Front-Wheel Walker;Unable to determine at this time   Discharge Recommendations Recommend post-acute placement for additional physical therapy services prior to discharge home  (PHYSIATRY CONSULT)

## 2021-02-19 NOTE — CARE PLAN
Problem: Safety  Goal: Will remain free from falls  Outcome: PROGRESSING AS EXPECTED  Intervention: Implement fall precautions  Flowsheets   Environmental Precautions:   Personal Belongings, Wastebasket, Call Bell etc. in Easy Reach   Report Given to Other Health Care Providers Regarding Fall Risk   Bed in Low Position   Mobility Assessed & Appropriate Sign Placed   Communication Sign for Patients & Families   Transferred to Stronger Side   Treaded Slipper Socks on Patient     Problem: Pain Management  Goal: Pain level will decrease to patient's comfort goal  Outcome: PROGRESSING AS EXPECTED  Intervention: Educate and implement non-pharmacologic comfort measures. Examples: relaxation, distration, play therapy, activity therapy, massage, etc.  Flowsheets    Medication (see MAR)   Rest   Repositioned

## 2021-02-19 NOTE — DISCHARGE PLANNING
Anticipated Discharge Disposition: Home with [facility]    Action: LSW met with pt at bedside to obtain C choice. Pt would like the following referrals sent:    1) [facility]  2) [facility]    LSW faxed choice form to [facility].    Barriers to Discharge: [facility] acceptance.    Plan: Care coordination will follow up with [facility] referrals.

## 2021-02-19 NOTE — THERAPY
"Occupational Therapy   Initial Evaluation     Patient Name: Nafisa Hancock  Age:  80 y.o., Sex:  female  Medical Record #: [id number]  Today's Date: 2/19/2021     Precautions  Precautions: (P) Fall Risk, Weight Bearing As Tolerated Right Lower Extremity  Comments: (P) multiple closed R pelvic fx's, non-op    Assessment  Patient is 80 y.o. female admitted following GLF sustained multiple closed R pelvic fx's, non-op, WBAT RLE. Pt lives in a SLH with SO, normally independent with ADLs/IADLs and ambulates without an AD at baseline states she tripped on carpet in her home. Pt required maxA for LE ADLs, Mathieu for STS transfers, and displayed limited activity tolerance and overall strength. Pt is very eager to return home with SO but would benefit from continued skilled therapy while admitted as well as post-acute placement.    Plan    Recommend Occupational Therapy 3 times per week until therapy goals are met for the following treatments:  Adaptive Equipment, Self Care/Activities of Daily Living and Therapeutic Activities.    DC Equipment Recommendations: (P) Unable to determine at this time  Discharge Recommendations: (P) Recommend post-acute placement for additional occupational therapy services prior to discharge home     Subjective    \"I want to go home, not some nursing home\"     Objective       02/19/21 1237   Prior Living Situation   Prior Services Home-Independent   Housing / Facility 1 Story House   Steps Into Home 1   Steps In Home 1   Bathroom Set up Bathtub / Shower Combination   Equipment Owned None   Lives with - Patient's Self Care Capacity Significant Other   Comments Pt reports SO is able to assist as needed   Prior Level of ADL Function   Self Feeding Independent   Grooming / Hygiene Independent   Bathing Independent   Dressing Independent   Toileting Independent   Prior Level of IADL Function   Medication Management Independent   Laundry Independent   Kitchen Mobility Independent   Finances " Independent   Home Management Independent   Shopping Independent   Prior Level Of Mobility Independent Without Device in Community   Driving / Transportation Relatives / Others Provide Transportation   Occupation (Pre-Hospital Vocational) Retired Due To Age   Leisure Interests Gardening   History of Falls   History of Falls Yes   Date of Last Fall   (reason for admit)   Precautions   Precautions Fall Risk;Weight Bearing As Tolerated Right Lower Extremity   Comments multiple closed R pelvic fx's, non-op   Pain 0 - 10 Group   Therapist Pain Assessment During Activity;Nurse Notified;7   Cognition    Cognition / Consciousness X   Level of Consciousness Alert   Attention Impaired   Comments Easily distracted, eager to go home   Active ROM Upper Body   Active ROM Upper Body  WDL   Dominant Hand Right   Comments pre-existing shoulder limitations from past RC tears   Strength Upper Body   Upper Body Strength  WDL   Sensation Upper Body   Upper Extremity Sensation  WDL   Upper Body Muscle Tone   Upper Body Muscle Tone  WDL   Neurological Concerns   Neurological Concerns No   Coordination Upper Body   Coordination WDL   Balance Assessment   Sitting Balance (Static) Fair +   Sitting Balance (Dynamic) Fair +   Standing Balance (Static) Fair -   Standing Balance (Dynamic) Poor +   Weight Shift Sitting Fair   Weight Shift Standing Fair   Comments w/ FWW   Bed Mobility    Comments pt up in chair at beginning of session, returned to chair at end of session   ADL Assessment   Upper Body Dressing Supervision   Lower Body Dressing Maximal Assist   Toileting   (NT-refused)   How much help from another person does the patient currently need...   Putting on and taking off regular lower body clothing? 2   Bathing (including washing, rinsing, and drying)? 3   Toileting, which includes using a toilet, bedpan, or urinal? 4   Putting on and taking off regular upper body clothing? 4   Taking care of personal grooming such as brushing teeth? 4    Eating meals? 4   6 Clicks Daily Activity Score 21   Functional Mobility   Sit to Stand Minimal Assist   Bed, Chair, Wheelchair Transfer Minimal Assist   Toilet Transfers Refused   Transfer Method Stand Step   Mobility STS from chair, ambulate in room, back to chair   Comments w/ FWW   Visual Perception   Visual Perception  WDL   Activity Tolerance   Sitting in Chair 10   Standing 10   Patient / Family Goals   Patient / Family Goal #1 To go home   Short Term Goals   Short Term Goal # 1 Pt will complete LB dressing with supervision AE PRN   Short Term Goal # 2 Pt will complete ADL transfers with supervision   Short Term Goal # 3 Pt will complete toileting with supervision   Education Group   Education Provided Role of Occupational Therapist   Role of Occupational Therapist Patient Response Patient;Acceptance;Explanation   Problem List   Problem List Decreased Active Daily Living Skills;Decreased Homemaking Skills;Decreased Activity Tolerance;Decreased Functional Mobility;Safety Awareness Deficits / Cognition   Interdisciplinary Plan of Care Collaboration   IDT Collaboration with  Nursing   Patient Position at End of Therapy Seated;Call Light within Reach;Phone within Reach;Tray Table within Reach   Collaboration Comments RN updated

## 2021-02-19 NOTE — DISCHARGE PLANNING
Received Choice form at 0914  Agency/Facility Name: 1)[facility], 2)[facility]  Referral sent per Choice form @ 0906     @[phone]  Agency/Facility Name: [facility]  Spoke To: Gely  Outcome: Pt is declined due to non-contracted insurance    @1230  Agency/Facility Name: [facility]   Outcome: Left vmail requesting update with referral    @[phone]  Agency/Facility Name: [facility]   Spoke To: Anali   Outcome: Anali stated that pt has to [facility] because they have [facility] insurance.  LSW Annamarie North notified

## 2021-02-20 ENCOUNTER — HOME HEALTH ADMISSION (OUTPATIENT)
Dept: HOME HEALTH SERVICES | Facility: HOME HEALTHCARE | Age: 81
End: 2021-02-20
Payer: MEDICARE

## 2021-02-20 PROCEDURE — 97116 GAIT TRAINING THERAPY: CPT | Mod: CQ

## 2021-02-20 PROCEDURE — A9270 NON-COVERED ITEM OR SERVICE: HCPCS | Performed by: STUDENT IN AN ORGANIZED HEALTH CARE EDUCATION/TRAINING PROGRAM

## 2021-02-20 PROCEDURE — 97535 SELF CARE MNGMENT TRAINING: CPT | Mod: CQ

## 2021-02-20 PROCEDURE — 700102 HCHG RX REV CODE 250 W/ 637 OVERRIDE(OP): Performed by: STUDENT IN AN ORGANIZED HEALTH CARE EDUCATION/TRAINING PROGRAM

## 2021-02-20 PROCEDURE — 97530 THERAPEUTIC ACTIVITIES: CPT | Mod: CQ

## 2021-02-20 PROCEDURE — 770006 HCHG ROOM/CARE - MED/SURG/GYN SEMI*

## 2021-02-20 PROCEDURE — 99233 SBSQ HOSP IP/OBS HIGH 50: CPT | Performed by: INTERNAL MEDICINE

## 2021-02-20 PROCEDURE — 700101 HCHG RX REV CODE 250: Performed by: STUDENT IN AN ORGANIZED HEALTH CARE EDUCATION/TRAINING PROGRAM

## 2021-02-20 RX ORDER — CALCIUM CARBONATE 500 MG/1
500 TABLET, CHEWABLE ORAL DAILY
Status: DISCONTINUED | OUTPATIENT
Start: 2021-02-20 | End: 2021-02-20

## 2021-02-20 RX ORDER — CALCIUM CARBONATE 500 MG/1
500-1000 TABLET, CHEWABLE ORAL EVERY 6 HOURS PRN
Status: DISCONTINUED | OUTPATIENT
Start: 2021-02-20 | End: 2021-02-22 | Stop reason: HOSPADM

## 2021-02-20 RX ADMIN — OXYCODONE AND ACETAMINOPHEN 2 TABLET: 5; 325 TABLET ORAL at 16:35

## 2021-02-20 RX ADMIN — ESTRADIOL 0.5 MG: 1 TABLET ORAL at 04:21

## 2021-02-20 RX ADMIN — POLYETHYLENE GLYCOL 3350 1 PACKET: 17 POWDER, FOR SOLUTION ORAL at 16:38

## 2021-02-20 RX ADMIN — OXYCODONE AND ACETAMINOPHEN 2 TABLET: 5; 325 TABLET ORAL at 03:52

## 2021-02-20 RX ADMIN — LABETALOL HYDROCHLORIDE 10 MG: 5 INJECTION, SOLUTION INTRAVENOUS at 16:42

## 2021-02-20 RX ADMIN — MAGNESIUM HYDROXIDE 30 ML: 400 SUSPENSION ORAL at 20:38

## 2021-02-20 RX ADMIN — LISINOPRIL 10 MG: 10 TABLET ORAL at 04:22

## 2021-02-20 RX ADMIN — CALCIUM CARBONATE 1000 MG: 500 TABLET, CHEWABLE ORAL at 04:21

## 2021-02-20 RX ADMIN — DOCUSATE SODIUM 50 MG AND SENNOSIDES 8.6 MG 2 TABLET: 8.6; 5 TABLET, FILM COATED ORAL at 16:38

## 2021-02-20 RX ADMIN — LABETALOL HYDROCHLORIDE 10 MG: 5 INJECTION, SOLUTION INTRAVENOUS at 07:49

## 2021-02-20 RX ADMIN — MEDROXYPROGESTERONE ACETATE 2.5 MG: 2.5 TABLET ORAL at 10:34

## 2021-02-20 ASSESSMENT — PATIENT HEALTH QUESTIONNAIRE - PHQ9
1. LITTLE INTEREST OR PLEASURE IN DOING THINGS: NOT AT ALL
2. FEELING DOWN, DEPRESSED, IRRITABLE, OR HOPELESS: NOT AT ALL
SUM OF ALL RESPONSES TO PHQ9 QUESTIONS 1 AND 2: 0

## 2021-02-20 ASSESSMENT — GAIT ASSESSMENTS
ASSISTIVE DEVICE: FRONT WHEEL WALKER
DISTANCE (FEET): 10
DEVIATION: STEP TO;SHUFFLED GAIT;DECREASED HEEL STRIKE;DECREASED TOE OFF
GAIT LEVEL OF ASSIST: MODERATE ASSIST

## 2021-02-20 ASSESSMENT — ENCOUNTER SYMPTOMS
ABDOMINAL PAIN: 0
SHORTNESS OF BREATH: 0

## 2021-02-20 ASSESSMENT — COGNITIVE AND FUNCTIONAL STATUS - GENERAL
CLIMB 3 TO 5 STEPS WITH RAILING: A LOT
TURNING FROM BACK TO SIDE WHILE IN FLAT BAD: UNABLE
MOBILITY SCORE: 11
STANDING UP FROM CHAIR USING ARMS: A LITTLE
MOVING TO AND FROM BED TO CHAIR: UNABLE
WALKING IN HOSPITAL ROOM: A LITTLE
MOVING FROM LYING ON BACK TO SITTING ON SIDE OF FLAT BED: UNABLE
SUGGESTED CMS G CODE MODIFIER MOBILITY: CL

## 2021-02-20 ASSESSMENT — PAIN DESCRIPTION - PAIN TYPE
TYPE: ACUTE PAIN
TYPE: ACUTE PAIN

## 2021-02-20 NOTE — CARE PLAN
Problem: Safety  Goal: Will remain free from injury  Outcome: PROGRESSING AS EXPECTED  Note: Non-slip socks are on, bed is locked and in lowest position, personal belongings are within reach, room is free of clutter and spills, call light is in place. Patient educated on how to use the call light and how to call for assistance. Patient verbalized understanding.       Problem: Communication  Goal: The ability to communicate needs accurately and effectively will improve  Outcome: PROGRESSING AS EXPECTED  Note: Patient able to communicate needs. All questions answered at this time.

## 2021-02-20 NOTE — THERAPY
Physical Therapy   Daily Treatment     Patient Name: Nafisa Hancock  Age:  80 y.o., Sex:  female  Medical Record #: [id number]  Today's Date: 2/20/2021     Precautions: Fall Risk, Weight Bearing As Tolerated Right Lower Extremity    Assessment    Pt pleasant and incredibly motivated with goal of going home. She demonstrated potentially increased confusion today talking about children singing in middle of night for [facility] here. As well poor insight into deficits and mobility. Her  was present for tx session and answered questions regarding home versus rehab. Including expectations of therapy in acute setting. She required modA for trunk and BLE to reach EOB and extra time for sequencing with no bed features. Initially with STS transfers she demonstrated strong posterior lean requiring assist and cues for keeping fww on ground and anterior weight shifting. She completed 10ft of gait training with step to gait pattern, decreased heel to toe and extended time 20 minutes to completed with modA at first progressing to Mathieu for balance. After discussion with  he is concerned she needs to be stronger at this time. Therefore encouraged increased mobility and recommend post acute placement at this time.     Plan    Continue current treatment plan.    DC Equipment Recommendations: Front-Wheel Walker, Unable to determine at this time  Discharge Recommendations: Recommend post-acute placement for additional physical therapy services prior to discharge home       02/20/21 0858   Other Treatments   Other Treatments Provided Extensive discussion regarding expectations of therapy in acute setting rehba versus home.  present and concerned about going home at this time. Educated on seated exercises and increasing mobility.    Balance   Sitting Balance (Static) Fair +   Sitting Balance (Dynamic) Fair +   Standing Balance (Static) Poor +   Standing Balance (Dynamic) Poor +   Weight Shift Sitting Fair    Weight Shift Standing Poor   Skilled Intervention Verbal Cuing   Comments standing posterior lean initially when standing improved with mobility.    Gait Analysis   Gait Level Of Assist Moderate Assist  (progressed to Mathieu )   Assistive Device Front Wheel Walker   Distance (Feet) 10   # of Times Distance was Traveled 1   Deviation Step To;Shuffled Gait;Decreased Heel Strike;Decreased Toe Off   Weight Bearing Status WBAT RLE    Comments Significant time 20 minutes to complete 10ft of gait training Mathieu for safety and balance. Easily distracted. Minimal recall of proper technique of using fww at this time.    Bed Mobility    Supine to Sit Moderate Assist   Sit to Supine   (left up in chair )   Scooting Minimal Assist   Comments pt up in chair at end. ModA for trunk and BLE to reach EOB wih no bed features utilized    Functional Mobility   Sit to Stand Minimal Assist   Bed, Chair, Wheelchair Transfer Minimal Assist   Skilled Intervention Verbal Cuing   Comments posterior lean when standing cues for anterior weight shifting and cues to keep fww on ground for safety    Short Term Goals    Short Term Goal # 1 pt will perform supine <> sit with HOB flat, no railing and SPV in 6 vistis   Goal Outcome # 1 goal not met   Short Term Goal # 2 pt will perform sit <> stand and functional transfers with LRAD and SPV to improve mobility independence in 6 visits   Goal Outcome # 2 Goal not met   Short Term Goal # 3 pt will ambulate > 75 ft with LRAD and SPV to access home environment in 6 visits   Goal Outcome # 3 Goal not met   Short Term Goal # 4 pt will negotiate 1 step with LRAD and Min A to access home in 6 visits   Goal Outcome # 4 Goal not met

## 2021-02-20 NOTE — PROGRESS NOTES
Received report and assumed pt care.  A&O x4.  Bed alarm and treaded socks on.  Call light and personal belongings within reach.  Bed locked and at lowest position.  JEROME BURDICK.

## 2021-02-20 NOTE — PROGRESS NOTES
Spoke with MD Servando Tian about patient requesting TUMS for acid reflux.    Was given a telephone order with read back for 1-2 tablets Q6 hours PRN.

## 2021-02-20 NOTE — DISCHARGE PLANNING
ATTN: Case Management  RE: Referral for Home Health    Reason for referral denial: needs a higher level of care              Unfortunately, we are not able to accept this referral for the reason listed above. If further clarity is needed, our Transitional Care Specialists are available to discuss any barriers to service at [phone].      We look forward to collaborating with you in the future,  [facility] Team

## 2021-02-20 NOTE — HOSPITAL COURSE
Nafisa Hancock is an 80 y.o. female who presented 2/17/2021 after ground level fall at home.  Patient reports she was walking from her den to her kitchen when her boots caught on the carpet and she fell and landed on her right hip as well as her right elbow.  Patient reports that she sustained a laceration on her right elbow, as well as experienced severe right hip pain that is worse on movement and palpation.  She was admitted for orthopedic surgery evaluation and pain control.  Recommendations were for nonoperative management.  Pain was well controlled on oral medications.  Sandoval catheter was removed and she had successful voiding trials.  She was evaluated by physical and Occupational Therapy and determined to be a good candidate for inpatient rehab.

## 2021-02-20 NOTE — PROGRESS NOTES
[facility] Medicine Daily Progress Note    Date of Service  2/19/2021    Chief Complaint  80 y.o. female admitted 2/17/2021 with pelvic fracture    Hospital Course  80 y.o. female who presented 2/17/2021 status post fall at home.  Patient reports she was walking from her den to her kitchen when her boots caught on the carpet and she fell and landed on her right hip as well as her right elbow.  Patient reports that she sustained a laceration on her right elbow, as well as experienced severe right hip pain that is worse on movement and palpation.  He denies any head trauma, LOC, seizure-like activity, bowel/bladder incontinence.  She denies any prior history of falls in the past.  Patient does report that the pain hurts too much to ambulate currently.  Otherwise, no further complaints at this time.      Interval Problem Update  Pelvic fracture is nonoperative per orthopedic surgery.  No acute complaints.  She has a Sandoval catheter in place, will attempt voiding trials.  Needs therapy evaluations    Consultants/Specialty  Orthopedic surgery    Code Status  Full Code    Disposition  Pending therapy evaluations    Review of Systems  Review of Systems   Respiratory: Negative for shortness of breath.    Cardiovascular: Negative for chest pain.   Gastrointestinal: Negative for abdominal pain.   All other systems reviewed and are negative.       Physical Exam  Temp:  [36.1 °C (97 °F)-36.4 °C (97.5 °F)] 36.4 °C (97.5 °F)  Pulse:  [80-98] 80  Resp:  [16-18] 16  BP: (159-199)/() 199/82  SpO2:  [90 %-99 %] 99 %    Physical Exam  Vitals and nursing note reviewed.   Constitutional:       General: She is not in acute distress.  HENT:      Head: Normocephalic and atraumatic.      Right Ear: External ear normal.      Left Ear: External ear normal.      Nose: Nose normal.      Mouth/Throat:      Mouth: Mucous membranes are moist.      Pharynx: Oropharynx is clear.   Eyes:      General: No scleral icterus.     Conjunctiva/sclera:  Conjunctivae normal.   Cardiovascular:      Rate and Rhythm: Normal rate and regular rhythm.   Pulmonary:      Effort: Pulmonary effort is normal.      Breath sounds: Normal breath sounds.   Abdominal:      Palpations: Abdomen is soft.      Tenderness: There is no abdominal tenderness. There is no guarding.   Musculoskeletal:         General: No swelling or deformity.      Cervical back: Normal range of motion and neck supple.   Skin:     General: Skin is warm and dry.   Neurological:      General: No focal deficit present.      Mental Status: She is alert and oriented to person, place, and time.   Psychiatric:         Mood and Affect: Mood normal.         Behavior: Behavior normal.         Fluids    Intake/Output Summary (Last 24 hours) at 2/20/2021 1022  Last data filed at 2/20/2021 0500  Gross per 24 hour   Intake --   Output 800 ml   Net -800 ml       Laboratory  Recent Labs     02/17/21 1953 02/18/21 0255 02/19/21  0702   WBC 15.0* 11.1* 10.2   RBC 4.18* 4.04* 4.21   HEMOGLOBIN 12.9 12.3 12.7   HEMATOCRIT 38.7 36.8* 38.8   MCV 92.6 91.1 92.2   MCH 30.9 30.4 30.2   MCHC 33.3* 33.4* 32.7*   RDW 45.6 44.8 46.0   PLATELETCT 252 241 239   MPV 9.7 9.8 10.1     Recent Labs     02/17/21 1953 02/18/21  0255 02/19/21  0702   SODIUM 133* 135 135   POTASSIUM 3.6 3.5* 4.1   CHLORIDE 100 101 100   CO2 21 21 22   GLUCOSE 105* 148* 113*   BUN 11 9 10   CREATININE 0.67 0.54 0.59   CALCIUM 8.8 8.6 9.3     Recent Labs     02/17/21 1953   APTT 29.2   INR 1.04               Imaging  CT-PELVIS WITH & W/O   Final Result         1.  Right pelvic fractures as previously described.   2.  No contrast leak from the bladder to suggest bladder injury.      DX-ELBOW-COMPLETE 3+ RIGHT   Final Result         1.  No acute traumatic bony injury.         CT-PELVIS W/O PLUS RECONS   Final Result         1.  Right inferior and superior pubic rami fractures.   2.  Right symphyseal fracture   3.  Possible subtle right sacral alar fracture  anteriorly   4.  Hematoma adjacent to the right bladder, recommend follow-up CT cystogram to exclude bladder injury.      These findings were discussed with the patient's clinician, Jack Summers, on 2/17/2021 8:25 PM.           Assessment/Plan  * Pelvic fracture (HCC)- (present on admission)  Assessment & Plan  Pelvic pain status post ground-level mechanical fall at home  No signs of seizure or syncope  No obvious head trauma  Right elbow laceration, sutured in the ED  CT imaging as above  -C/W analgesics for symptom management  -Holding chemical VTE due to hematoma  -Ortho (Dr. Telles) consulted by ED, commended nonoperative management  -Needs PT/OT    Pelvic hematoma in female- (present on admission)  Assessment & Plan  Pelvic hematoma adjacent to the right bladder noted on CT imaging  Follow-up CT pelvis w/ and w/o Contrast without signs of bladder injury  -Hold chemical VTE  -Monitor CBC    HTN (hypertension)- (present on admission)  Assessment & Plan  SBP elevated 190s to 200s  Patient takes lisinopril 10 mg at home, states she has taken today's dose  Patient does report anxiety as well as pelvic pain  Likely combination of anxieties and pelvic pain  -C/W analgesics  -C/w home lisinopril 10mg qd  -Labetolol 10mg IV q4h PRN    Leukocytosis- (present on admission)  Assessment & Plan  Patient denies any obvious signs or symptoms of infection at this time  Possibly reactive due to severe pain  -F/U UA  -Monitor CBC    VTE: SCD, holding chemical VTE    Resolved no signs of infection       VTE prophylaxis: SCDs

## 2021-02-20 NOTE — DISCHARGE PLANNING
Agency/Facility Name: [facility]  Spoke To: James  Outcome: Inquiring about a psych consult.   RN DAYNA Giang notified.     @1121 (Late note entry)  Agency/Facility Name: [facility]   Spoke To: James  Outcome: referral has been escalated to nurse supervisor.

## 2021-02-21 PROCEDURE — A9270 NON-COVERED ITEM OR SERVICE: HCPCS | Performed by: STUDENT IN AN ORGANIZED HEALTH CARE EDUCATION/TRAINING PROGRAM

## 2021-02-21 PROCEDURE — 99231 SBSQ HOSP IP/OBS SF/LOW 25: CPT | Performed by: INTERNAL MEDICINE

## 2021-02-21 PROCEDURE — 700102 HCHG RX REV CODE 250 W/ 637 OVERRIDE(OP): Performed by: STUDENT IN AN ORGANIZED HEALTH CARE EDUCATION/TRAINING PROGRAM

## 2021-02-21 PROCEDURE — 770006 HCHG ROOM/CARE - MED/SURG/GYN SEMI*

## 2021-02-21 RX ADMIN — OXYCODONE AND ACETAMINOPHEN 2 TABLET: 5; 325 TABLET ORAL at 04:56

## 2021-02-21 RX ADMIN — OXYCODONE AND ACETAMINOPHEN 2 TABLET: 5; 325 TABLET ORAL at 14:55

## 2021-02-21 RX ADMIN — OXYCODONE AND ACETAMINOPHEN 2 TABLET: 5; 325 TABLET ORAL at 18:45

## 2021-02-21 RX ADMIN — OXYCODONE AND ACETAMINOPHEN 2 TABLET: 5; 325 TABLET ORAL at 09:05

## 2021-02-21 RX ADMIN — ESTRADIOL 0.5 MG: 1 TABLET ORAL at 04:38

## 2021-02-21 RX ADMIN — LABETALOL HYDROCHLORIDE 10 MG: 5 INJECTION, SOLUTION INTRAVENOUS at 06:31

## 2021-02-21 RX ADMIN — MEDROXYPROGESTERONE ACETATE 2.5 MG: 2.5 TABLET ORAL at 04:38

## 2021-02-21 RX ADMIN — LISINOPRIL 10 MG: 10 TABLET ORAL at 04:39

## 2021-02-21 RX ADMIN — DOCUSATE SODIUM 50 MG AND SENNOSIDES 8.6 MG 2 TABLET: 8.6; 5 TABLET, FILM COATED ORAL at 04:37

## 2021-02-21 RX ADMIN — OXYCODONE AND ACETAMINOPHEN 2 TABLET: 5; 325 TABLET ORAL at 00:46

## 2021-02-21 ASSESSMENT — PAIN DESCRIPTION - PAIN TYPE
TYPE: ACUTE PAIN

## 2021-02-21 ASSESSMENT — ENCOUNTER SYMPTOMS
SHORTNESS OF BREATH: 0
ABDOMINAL PAIN: 0

## 2021-02-21 NOTE — PROGRESS NOTES
[facility] Medicine Daily Progress Note    Date of Service  2/20/2021    Chief Complaint  80 y.o. female admitted 2/17/2021 with pelvic fracture    Hospital Course  80 y.o. female who presented 2/17/2021 status post fall at home.  Patient reports she was walking from her den to her kitchen when her boots caught on the carpet and she fell and landed on her right hip as well as her right elbow.  Patient reports that she sustained a laceration on her right elbow, as well as experienced severe right hip pain that is worse on movement and palpation.  He denies any head trauma, LOC, seizure-like activity, bowel/bladder incontinence.  She denies any prior history of falls in the past.  Patient does report that the pain hurts too much to ambulate currently.  Otherwise, no further complaints at this time.      Interval Problem Update  Pelvic fracture is nonoperative per orthopedic surgery.  Voiding trials in process.  She now understands that postacute rehab is necessary for her recovery.  Referrals placed.    Consultants/Specialty  Orthopedic surgery    Code Status  Full Code    Disposition  Pending therapy evaluations    Review of Systems  Review of Systems   Respiratory: Negative for shortness of breath.    Cardiovascular: Negative for chest pain.   Gastrointestinal: Negative for abdominal pain.   All other systems reviewed and are negative.       Physical Exam  Temp:  [36.1 °C (97 °F)-36.7 °C (98 °F)] 36.5 °C (97.7 °F)  Pulse:  [80-88] 85  Resp:  [16-18] 16  BP: (157-199)/() 157/74  SpO2:  [91 %-99 %] 91 %    Physical Exam  Vitals and nursing note reviewed.   Constitutional:       General: She is not in acute distress.  HENT:      Head: Normocephalic and atraumatic.      Right Ear: External ear normal.      Left Ear: External ear normal.      Nose: Nose normal.      Mouth/Throat:      Mouth: Mucous membranes are moist.      Pharynx: Oropharynx is clear.   Eyes:      General: No scleral icterus.      Conjunctiva/sclera: Conjunctivae normal.   Cardiovascular:      Rate and Rhythm: Normal rate and regular rhythm.   Pulmonary:      Effort: Pulmonary effort is normal.      Breath sounds: Normal breath sounds.   Abdominal:      Palpations: Abdomen is soft.      Tenderness: There is no abdominal tenderness. There is no guarding.   Musculoskeletal:         General: No swelling or deformity.      Cervical back: Normal range of motion and neck supple.      Comments: Right groin tenderness.   Skin:     General: Skin is warm and dry.   Neurological:      General: No focal deficit present.      Mental Status: She is alert and oriented to person, place, and time.   Psychiatric:         Mood and Affect: Mood normal.         Behavior: Behavior normal.         Fluids    Intake/Output Summary (Last 24 hours) at 2/20/2021 2153  Last data filed at 2/20/2021 1600  Gross per 24 hour   Intake --   Output 1000 ml   Net -1000 ml       Laboratory  Recent Labs     02/18/21  0255 02/19/21  0702   WBC 11.1* 10.2   RBC 4.04* 4.21   HEMOGLOBIN 12.3 12.7   HEMATOCRIT 36.8* 38.8   MCV 91.1 92.2   MCH 30.4 30.2   MCHC 33.4* 32.7*   RDW 44.8 46.0   PLATELETCT 241 239   MPV 9.8 10.1     Recent Labs     02/18/21  0255 02/19/21  0702   SODIUM 135 135   POTASSIUM 3.5* 4.1   CHLORIDE 101 100   CO2 21 22   GLUCOSE 148* 113*   BUN 9 10   CREATININE 0.54 0.59   CALCIUM 8.6 9.3                   Imaging  CT-PELVIS WITH & W/O   Final Result         1.  Right pelvic fractures as previously described.   2.  No contrast leak from the bladder to suggest bladder injury.      DX-ELBOW-COMPLETE 3+ RIGHT   Final Result         1.  No acute traumatic bony injury.         CT-PELVIS W/O PLUS RECONS   Final Result         1.  Right inferior and superior pubic rami fractures.   2.  Right symphyseal fracture   3.  Possible subtle right sacral alar fracture anteriorly   4.  Hematoma adjacent to the right bladder, recommend follow-up CT cystogram to exclude bladder  injury.      These findings were discussed with the patient's clinician, Jack Summers, on 2/17/2021 8:25 PM.           Assessment/Plan  * Pelvic fracture (HCC)- (present on admission)  Assessment & Plan  Pelvic pain status post ground-level mechanical fall at home  No signs of seizure or syncope  No obvious head trauma  Right elbow laceration, sutured in the ED  CT imaging as above  -C/W analgesics for symptom management  -Holding chemical VTE due to hematoma  -Ortho (Dr. Telles) consulted by ED, commended nonoperative management  -Needs ongoing PT/OT    Pelvic hematoma in female- (present on admission)  Assessment & Plan  Pelvic hematoma adjacent to the right bladder noted on CT imaging  Follow-up CT pelvis w/ and w/o Contrast without signs of bladder injury  -Hold chemical VTE  -Monitor CBC    HTN (hypertension)- (present on admission)  Assessment & Plan  SBP elevated 190s to 200s  Patient takes lisinopril 10 mg at home, states she has taken today's dose  Patient does report anxiety as well as pelvic pain  Likely combination of anxieties and pelvic pain  -C/W analgesics  -C/w home lisinopril 10mg qd  -Labetolol 10mg IV q4h PRN    Leukocytosis- (present on admission)  Assessment & Plan  Patient denies any obvious signs or symptoms of infection at this time  Possibly reactive due to severe pain  -F/U UA  -Monitor CBC    VTE: SCD, holding chemical VTE    Resolved no signs of infection       VTE prophylaxis: SCDs

## 2021-02-21 NOTE — CARE PLAN
Problem: Communication  Goal: The ability to communicate needs accurately and effectively will improve  Outcome: PROGRESSING AS EXPECTED  Note: Educated how to use call light. Pt calls appropriately. POC discussed, all questions answered at this time.      Problem: Bowel/Gastric:  Goal: Normal bowel function is maintained or improved  Outcome: PROGRESSING AS EXPECTED  Note: Bowel regimen initiated. Milk of Magnesia given, and scheduled stool softeners in place.      Problem: Urinary Elimination:  Goal: Ability to reestablish a normal urinary elimination pattern will improve  Outcome: PROGRESSING AS EXPECTED  Note: Patient has been voiding with use of purewick.

## 2021-02-21 NOTE — PROGRESS NOTES
"Pt refuses to mobilize. Explained importance of mobility pt continues to refuse. First stating, \"They will do that when I get to rehab.\" Explained to patient that even though rehab will do therapy, it is still important to mobilize for duration of hospital stay. Verbalized understanding. Continued to refuse. Second time, patient stating, \"I just don't want to right now.\" Verbalized understanding of education. Third time, patient stating, \"I'm afraid I might have another movement in the chair. Explained that patient could use BSC. Pt continuing to refuse.   "

## 2021-02-22 ENCOUNTER — HOSPITAL ENCOUNTER (INPATIENT)
Facility: REHABILITATION | Age: 81
LOS: 10 days | DRG: 560 | End: 2021-03-04
Attending: PHYSICAL MEDICINE & REHABILITATION | Admitting: PHYSICAL MEDICINE & REHABILITATION
Payer: MEDICARE

## 2021-02-22 VITALS
WEIGHT: 130 LBS | HEIGHT: 65 IN | OXYGEN SATURATION: 95 % | BODY MASS INDEX: 21.66 KG/M2 | RESPIRATION RATE: 15 BRPM | SYSTOLIC BLOOD PRESSURE: 146 MMHG | DIASTOLIC BLOOD PRESSURE: 59 MMHG | HEART RATE: 89 BPM | TEMPERATURE: 98 F

## 2021-02-22 DIAGNOSIS — N94.89 PELVIC HEMATOMA IN FEMALE: ICD-10-CM

## 2021-02-22 LAB
SARS-COV-2 RNA RESP QL NAA+PROBE: NOTDETECTED
SPECIMEN SOURCE: NORMAL

## 2021-02-22 PROCEDURE — U0005 INFEC AGEN DETEC AMPLI PROBE: HCPCS

## 2021-02-22 PROCEDURE — 700102 HCHG RX REV CODE 250 W/ 637 OVERRIDE(OP): Performed by: STUDENT IN AN ORGANIZED HEALTH CARE EDUCATION/TRAINING PROGRAM

## 2021-02-22 PROCEDURE — 770010 HCHG ROOM/CARE - REHAB SEMI PRIVAT*

## 2021-02-22 PROCEDURE — 700101 HCHG RX REV CODE 250: Performed by: STUDENT IN AN ORGANIZED HEALTH CARE EDUCATION/TRAINING PROGRAM

## 2021-02-22 PROCEDURE — A9270 NON-COVERED ITEM OR SERVICE: HCPCS | Performed by: STUDENT IN AN ORGANIZED HEALTH CARE EDUCATION/TRAINING PROGRAM

## 2021-02-22 PROCEDURE — A9270 NON-COVERED ITEM OR SERVICE: HCPCS | Performed by: PHYSICAL MEDICINE & REHABILITATION

## 2021-02-22 PROCEDURE — U0003 INFECTIOUS AGENT DETECTION BY NUCLEIC ACID (DNA OR RNA); SEVERE ACUTE RESPIRATORY SYNDROME CORONAVIRUS 2 (SARS-COV-2) (CORONAVIRUS DISEASE [COVID-19]), AMPLIFIED PROBE TECHNIQUE, MAKING USE OF HIGH THROUGHPUT TECHNOLOGIES AS DESCRIBED BY CMS-2020-01-R: HCPCS

## 2021-02-22 PROCEDURE — 700102 HCHG RX REV CODE 250 W/ 637 OVERRIDE(OP): Performed by: PHYSICAL MEDICINE & REHABILITATION

## 2021-02-22 PROCEDURE — 94760 N-INVAS EAR/PLS OXIMETRY 1: CPT

## 2021-02-22 PROCEDURE — A9270 NON-COVERED ITEM OR SERVICE: HCPCS | Performed by: INTERNAL MEDICINE

## 2021-02-22 PROCEDURE — 700102 HCHG RX REV CODE 250 W/ 637 OVERRIDE(OP): Performed by: INTERNAL MEDICINE

## 2021-02-22 PROCEDURE — 99239 HOSP IP/OBS DSCHRG MGMT >30: CPT | Performed by: INTERNAL MEDICINE

## 2021-02-22 RX ORDER — OXYCODONE HYDROCHLORIDE AND ACETAMINOPHEN 5; 325 MG/1; MG/1
2 TABLET ORAL EVERY 4 HOURS PRN
Status: CANCELLED | OUTPATIENT
Start: 2021-02-22

## 2021-02-22 RX ORDER — BISACODYL 10 MG
10 SUPPOSITORY, RECTAL RECTAL
Status: ON HOLD
Start: 2021-02-22 | End: 2021-03-04

## 2021-02-22 RX ORDER — TRAZODONE HYDROCHLORIDE 50 MG/1
50 TABLET ORAL
Status: DISCONTINUED | OUTPATIENT
Start: 2021-02-22 | End: 2021-03-04 | Stop reason: HOSPADM

## 2021-02-22 RX ORDER — LISINOPRIL 20 MG/1
20 TABLET ORAL DAILY
Qty: 100 TABLET | Refills: 0 | Status: ON HOLD | OUTPATIENT
Start: 2021-02-22 | End: 2021-03-04

## 2021-02-22 RX ORDER — B-COMPLEX WITH VITAMIN C
1 TABLET ORAL DAILY
Status: ON HOLD
Start: 2021-02-22 | End: 2021-03-04

## 2021-02-22 RX ORDER — MEDROXYPROGESTERONE ACETATE 2.5 MG/1
2.5 TABLET ORAL DAILY
Status: ON HOLD
Start: 2021-02-22 | End: 2021-03-04

## 2021-02-22 RX ORDER — MEDROXYPROGESTERONE ACETATE 2.5 MG/1
2.5 TABLET ORAL DAILY
Status: DISCONTINUED | OUTPATIENT
Start: 2021-02-23 | End: 2021-03-04 | Stop reason: HOSPADM

## 2021-02-22 RX ORDER — ESTRADIOL 1 MG/1
0.5 TABLET ORAL DAILY
Status: CANCELLED | OUTPATIENT
Start: 2021-02-23

## 2021-02-22 RX ORDER — ACETAMINOPHEN 325 MG/1
650 TABLET ORAL EVERY 6 HOURS PRN
Status: CANCELLED | OUTPATIENT
Start: 2021-02-22

## 2021-02-22 RX ORDER — BISACODYL 10 MG
10 SUPPOSITORY, RECTAL RECTAL
Status: CANCELLED | OUTPATIENT
Start: 2021-02-22

## 2021-02-22 RX ORDER — HYDRALAZINE HYDROCHLORIDE 25 MG/1
25 TABLET, FILM COATED ORAL EVERY 8 HOURS PRN
Status: DISCONTINUED | OUTPATIENT
Start: 2021-02-22 | End: 2021-03-04 | Stop reason: HOSPADM

## 2021-02-22 RX ORDER — OXYCODONE HYDROCHLORIDE AND ACETAMINOPHEN 5; 325 MG/1; MG/1
1 TABLET ORAL EVERY 4 HOURS PRN
Status: DISCONTINUED | OUTPATIENT
Start: 2021-02-22 | End: 2021-02-24

## 2021-02-22 RX ORDER — POLYVINYL ALCOHOL 14 MG/ML
1 SOLUTION/ DROPS OPHTHALMIC PRN
Status: DISCONTINUED | OUTPATIENT
Start: 2021-02-22 | End: 2021-03-04 | Stop reason: HOSPADM

## 2021-02-22 RX ORDER — OXYCODONE HYDROCHLORIDE AND ACETAMINOPHEN 5; 325 MG/1; MG/1
2 TABLET ORAL EVERY 4 HOURS PRN
Status: DISCONTINUED | OUTPATIENT
Start: 2021-02-22 | End: 2021-02-24

## 2021-02-22 RX ORDER — POLYETHYLENE GLYCOL 3350 17 G/17G
1 POWDER, FOR SOLUTION ORAL
Status: CANCELLED | OUTPATIENT
Start: 2021-02-22

## 2021-02-22 RX ORDER — LISINOPRIL 5 MG/1
10 TABLET ORAL DAILY
Status: DISCONTINUED | OUTPATIENT
Start: 2021-02-23 | End: 2021-02-23

## 2021-02-22 RX ORDER — ESTRADIOL 0.5 MG/1
0.5 TABLET ORAL DAILY
Status: ON HOLD
Start: 2021-02-22 | End: 2021-03-04

## 2021-02-22 RX ORDER — OXYCODONE HYDROCHLORIDE AND ACETAMINOPHEN 5; 325 MG/1; MG/1
1-2 TABLET ORAL EVERY 4 HOURS PRN
Status: ON HOLD
Start: 2021-02-22 | End: 2021-03-04

## 2021-02-22 RX ORDER — LISINOPRIL 10 MG/1
10 TABLET ORAL DAILY
Status: CANCELLED | OUTPATIENT
Start: 2021-02-23

## 2021-02-22 RX ORDER — POLYETHYLENE GLYCOL 3350 17 G/17G
17 POWDER, FOR SOLUTION ORAL
Status: ON HOLD
Start: 2021-02-22 | End: 2021-03-04

## 2021-02-22 RX ORDER — AMOXICILLIN 250 MG
2 CAPSULE ORAL 2 TIMES DAILY
Qty: 30 TABLET | Status: ON HOLD
Start: 2021-02-22 | End: 2021-03-04

## 2021-02-22 RX ORDER — ESTRADIOL 1 MG/1
0.5 TABLET ORAL DAILY
Status: DISCONTINUED | OUTPATIENT
Start: 2021-02-23 | End: 2021-03-04 | Stop reason: HOSPADM

## 2021-02-22 RX ORDER — ONDANSETRON 2 MG/ML
4 INJECTION INTRAMUSCULAR; INTRAVENOUS 4 TIMES DAILY PRN
Status: DISCONTINUED | OUTPATIENT
Start: 2021-02-22 | End: 2021-03-04 | Stop reason: HOSPADM

## 2021-02-22 RX ORDER — ONDANSETRON 4 MG/1
4 TABLET, ORALLY DISINTEGRATING ORAL 4 TIMES DAILY PRN
Status: DISCONTINUED | OUTPATIENT
Start: 2021-02-22 | End: 2021-03-04 | Stop reason: HOSPADM

## 2021-02-22 RX ORDER — AMOXICILLIN 250 MG
2 CAPSULE ORAL 2 TIMES DAILY
Status: DISCONTINUED | OUTPATIENT
Start: 2021-02-22 | End: 2021-03-04 | Stop reason: HOSPADM

## 2021-02-22 RX ORDER — AMOXICILLIN 250 MG
2 CAPSULE ORAL 2 TIMES DAILY
Status: CANCELLED | OUTPATIENT
Start: 2021-02-22

## 2021-02-22 RX ORDER — VITAMIN B COMPLEX
1000 TABLET ORAL DAILY
Status: ON HOLD
Start: 2021-02-22 | End: 2021-03-04

## 2021-02-22 RX ORDER — CALCIUM CARBONATE 500 MG/1
500-1000 TABLET, CHEWABLE ORAL EVERY 6 HOURS PRN
Qty: 30 TABLET | Status: ON HOLD
Start: 2021-02-22 | End: 2021-03-04

## 2021-02-22 RX ORDER — POLYETHYLENE GLYCOL 3350 17 G/17G
1 POWDER, FOR SOLUTION ORAL
Status: DISCONTINUED | OUTPATIENT
Start: 2021-02-22 | End: 2021-03-04 | Stop reason: HOSPADM

## 2021-02-22 RX ORDER — LISINOPRIL 10 MG/1
10 TABLET ORAL ONCE
Status: COMPLETED | OUTPATIENT
Start: 2021-02-22 | End: 2021-02-22

## 2021-02-22 RX ORDER — ALUMINA, MAGNESIA, AND SIMETHICONE 2400; 2400; 240 MG/30ML; MG/30ML; MG/30ML
20 SUSPENSION ORAL
Status: DISCONTINUED | OUTPATIENT
Start: 2021-02-22 | End: 2021-03-04 | Stop reason: HOSPADM

## 2021-02-22 RX ORDER — LABETALOL HYDROCHLORIDE 5 MG/ML
10 INJECTION, SOLUTION INTRAVENOUS EVERY 4 HOURS PRN
Status: ON HOLD
Start: 2021-02-22 | End: 2021-03-04

## 2021-02-22 RX ORDER — MEDROXYPROGESTERONE ACETATE 2.5 MG/1
2.5 TABLET ORAL DAILY
Status: CANCELLED | OUTPATIENT
Start: 2021-02-23

## 2021-02-22 RX ORDER — ECHINACEA PURPUREA EXTRACT 125 MG
2 TABLET ORAL PRN
Status: DISCONTINUED | OUTPATIENT
Start: 2021-02-22 | End: 2021-03-04 | Stop reason: HOSPADM

## 2021-02-22 RX ORDER — OXYCODONE HYDROCHLORIDE AND ACETAMINOPHEN 5; 325 MG/1; MG/1
1 TABLET ORAL EVERY 4 HOURS PRN
Status: CANCELLED | OUTPATIENT
Start: 2021-02-22

## 2021-02-22 RX ORDER — BISACODYL 10 MG
10 SUPPOSITORY, RECTAL RECTAL
Status: DISCONTINUED | OUTPATIENT
Start: 2021-02-22 | End: 2021-03-04 | Stop reason: HOSPADM

## 2021-02-22 RX ORDER — ACETAMINOPHEN 325 MG/1
650 TABLET ORAL EVERY 6 HOURS PRN
Status: DISCONTINUED | OUTPATIENT
Start: 2021-02-22 | End: 2021-03-04 | Stop reason: HOSPADM

## 2021-02-22 RX ADMIN — MEDROXYPROGESTERONE ACETATE 2.5 MG: 2.5 TABLET ORAL at 04:34

## 2021-02-22 RX ADMIN — OXYCODONE AND ACETAMINOPHEN 2 TABLET: 5; 325 TABLET ORAL at 14:06

## 2021-02-22 RX ADMIN — OXYCODONE HYDROCHLORIDE AND ACETAMINOPHEN 2 TABLET: 5; 325 TABLET ORAL at 22:18

## 2021-02-22 RX ADMIN — LISINOPRIL 10 MG: 10 TABLET ORAL at 04:34

## 2021-02-22 RX ADMIN — DOCUSATE SODIUM 50 MG AND SENNOSIDES 8.6 MG 2 TABLET: 8.6; 5 TABLET, FILM COATED ORAL at 04:32

## 2021-02-22 RX ADMIN — LABETALOL HYDROCHLORIDE 10 MG: 5 INJECTION, SOLUTION INTRAVENOUS at 12:34

## 2021-02-22 RX ADMIN — OXYCODONE AND ACETAMINOPHEN 2 TABLET: 5; 325 TABLET ORAL at 09:10

## 2021-02-22 RX ADMIN — ESTRADIOL 0.5 MG: 1 TABLET ORAL at 04:33

## 2021-02-22 RX ADMIN — OXYCODONE HYDROCHLORIDE AND ACETAMINOPHEN 2 TABLET: 5; 325 TABLET ORAL at 18:15

## 2021-02-22 RX ADMIN — LISINOPRIL 10 MG: 10 TABLET ORAL at 15:11

## 2021-02-22 RX ADMIN — OXYCODONE AND ACETAMINOPHEN 2 TABLET: 5; 325 TABLET ORAL at 04:34

## 2021-02-22 ASSESSMENT — LIFESTYLE VARIABLES
ALCOHOL_USE: NO
CONSUMPTION TOTAL: NEGATIVE
REASON UNABLE TO ASSESS: .
TOTAL SCORE: 0
EVER_SMOKED: NEVER
HAVE YOU EVER FELT YOU SHOULD CUT DOWN ON YOUR DRINKING: NO
EVER FELT BAD OR GUILTY ABOUT YOUR DRINKING: NO
TOTAL SCORE: 0
EVER HAD A DRINK FIRST THING IN THE MORNING TO STEADY YOUR NERVES TO GET RID OF A HANGOVER: NO
AVERAGE NUMBER OF DAYS PER WEEK YOU HAVE A DRINK CONTAINING ALCOHOL: 0
TOTAL SCORE: 0
HAVE PEOPLE ANNOYED YOU BY CRITICIZING YOUR DRINKING: NO
HOW MANY TIMES IN THE PAST YEAR HAVE YOU HAD 5 OR MORE DRINKS IN A DAY: 0
ON A TYPICAL DAY WHEN YOU DRINK ALCOHOL HOW MANY DRINKS DO YOU HAVE: 0

## 2021-02-22 ASSESSMENT — FIBROSIS 4 INDEX
FIB4 SCORE: 1.9
FIB4 SCORE: 1.9

## 2021-02-22 ASSESSMENT — PATIENT HEALTH QUESTIONNAIRE - PHQ9
2. FEELING DOWN, DEPRESSED, IRRITABLE, OR HOPELESS: NOT AT ALL
1. LITTLE INTEREST OR PLEASURE IN DOING THINGS: NOT AT ALL
SUM OF ALL RESPONSES TO PHQ9 QUESTIONS 1 AND 2: 0

## 2021-02-22 ASSESSMENT — PAIN DESCRIPTION - PAIN TYPE
TYPE: ACUTE PAIN
TYPE: ACUTE PAIN

## 2021-02-22 NOTE — DISCHARGE PLANNING
Dr. Dean is recommending [facility].  Dr. Grover has medically cleared.  Submitted to insurance, Fatemeh @ [phone].     1014- Msg left for Ryley S.O. to look into D/C resources/support.  Msg left on home phone.  Mobile phone, sounded like someone picked up and then hung up.     1145-Insurance has authorized [phone].  Case is under review by Physiatry.     1304-Dr. Cosby has accepted.  Transport has been arranged for 1500.  Msg placed to Dr. Grover & Annamarie Siddiqui BSN is aware.

## 2021-02-22 NOTE — PROGRESS NOTES
[facility] Medicine Daily Progress Note    Date of Service  2/21/2021    Chief Complaint  80 y.o. female admitted 2/17/2021 with pelvic fracture    Hospital Course  80 y.o. female who presented 2/17/2021 status post fall at home.  Patient reports she was walking from her den to her kitchen when her boots caught on the carpet and she fell and landed on her right hip as well as her right elbow.  Patient reports that she sustained a laceration on her right elbow, as well as experienced severe right hip pain that is worse on movement and palpation.  He denies any head trauma, LOC, seizure-like activity, bowel/bladder incontinence.  She denies any prior history of falls in the past.  Patient does report that the pain hurts too much to ambulate currently.  Otherwise, no further complaints at this time.      Interval Problem Update  Awaiting rehab acceptance.  Medically cleared.   No acute complaints.    Consultants/Specialty  Orthopedic surgery    Code Status  Full Code    Disposition  Pending placement.    Review of Systems  Review of Systems   Respiratory: Negative for shortness of breath.    Cardiovascular: Negative for chest pain.   Gastrointestinal: Negative for abdominal pain.   All other systems reviewed and are negative.       Physical Exam  Temp:  [35.6 °C (96 °F)-36.9 °C (98.5 °F)] 36.9 °C (98.5 °F)  Pulse:  [72-95] 72  Resp:  [16-18] 17  BP: (143-168)/(76-89) 143/79  SpO2:  [91 %-95 %] 91 %    Physical Exam  Vitals and nursing note reviewed.   Constitutional:       General: She is not in acute distress.  HENT:      Head: Normocephalic and atraumatic.      Right Ear: External ear normal.      Left Ear: External ear normal.      Nose: Nose normal.      Mouth/Throat:      Mouth: Mucous membranes are moist.      Pharynx: Oropharynx is clear.   Eyes:      General: No scleral icterus.     Conjunctiva/sclera: Conjunctivae normal.   Cardiovascular:      Rate and Rhythm: Normal rate and regular rhythm.   Pulmonary:       Effort: Pulmonary effort is normal.      Breath sounds: Normal breath sounds.   Abdominal:      Palpations: Abdomen is soft.      Tenderness: There is no abdominal tenderness. There is no guarding.   Musculoskeletal:         General: No swelling or deformity.      Cervical back: Normal range of motion and neck supple.      Comments: Right groin tenderness.   Skin:     General: Skin is warm and dry.   Neurological:      General: No focal deficit present.      Mental Status: She is alert and oriented to person, place, and time.   Psychiatric:         Mood and Affect: Mood normal.         Behavior: Behavior normal.         Fluids    Intake/Output Summary (Last 24 hours) at 2/21/2021 2238  Last data filed at 2/21/2021 1400  Gross per 24 hour   Intake 320 ml   Output --   Net 320 ml       Laboratory  Recent Labs     02/19/21  0702   WBC 10.2   RBC 4.21   HEMOGLOBIN 12.7   HEMATOCRIT 38.8   MCV 92.2   MCH 30.2   MCHC 32.7*   RDW 46.0   PLATELETCT 239   MPV 10.1     Recent Labs     02/19/21  0702   SODIUM 135   POTASSIUM 4.1   CHLORIDE 100   CO2 22   GLUCOSE 113*   BUN 10   CREATININE 0.59   CALCIUM 9.3                   Imaging  CT-PELVIS WITH & W/O   Final Result         1.  Right pelvic fractures as previously described.   2.  No contrast leak from the bladder to suggest bladder injury.      DX-ELBOW-COMPLETE 3+ RIGHT   Final Result         1.  No acute traumatic bony injury.         CT-PELVIS W/O PLUS RECONS   Final Result         1.  Right inferior and superior pubic rami fractures.   2.  Right symphyseal fracture   3.  Possible subtle right sacral alar fracture anteriorly   4.  Hematoma adjacent to the right bladder, recommend follow-up CT cystogram to exclude bladder injury.      These findings were discussed with the patient's clinician, Jack Summers, on 2/17/2021 8:25 PM.           Assessment/Plan  * Pelvic fracture (HCC)- (present on admission)  Assessment & Plan  Pelvic pain status post ground-level  mechanical fall at home  No signs of seizure or syncope  No obvious head trauma  Right elbow laceration, sutured in the ED  CT imaging as above  -C/W analgesics for symptom management  -Holding chemical VTE due to hematoma  -Ortho (Dr. Telles) consulted by ED, commended nonoperative management  -Needs ongoing PT/OT     Pelvic hematoma in female- (present on admission)  Assessment & Plan  Pelvic hematoma adjacent to the right bladder noted on CT imaging  Follow-up CT pelvis w/ and w/o Contrast without signs of bladder injury  -Hold chemical VTE  -Monitor CBC    HTN (hypertension)- (present on admission)  Assessment & Plan  SBP elevated 190s to 200s on admission. Improved.  Patient takes lisinopril 10 mg at home, states she has taken today's dose  Patient does report anxiety as well as pelvic pain  Likely combination of anxieties and pelvic pain  -C/W analgesics  -C/w home lisinopril 10mg qd  -Labetolol 10mg IV q4h PRN     Leukocytosis- (present on admission)  Assessment & Plan  Resolved.  Patient denies any obvious signs or symptoms of infection at this time  Possibly reactive due to severe pain  -F/U UA  -Monitor CBC       VTE prophylaxis: SCDs

## 2021-02-22 NOTE — DISCHARGE PLANNING
[facility] Transitional Care Coordination     Referral from:  Dr. Marisel Grover  Facesheet indicates: [employer]  Potential Rehab Diagnosis:  Pelvic Fracture    Chart review indicates patient has on going medical management and therapy needs to possibly meet inpatient rehab facility criteria with the goal of returning to community.    D/C support: Significant Other/Neighbors     Physiatry to consult.      Last Covid test date: 2/17/2021 - COVID negative      Thank you for the referral.

## 2021-02-22 NOTE — DISCHARGE PLANNING
Care Transition Team Assessment    LSW met with pt at bedside to complete assessment. Pt A&Ox4 and able to verify information on the face sheet. Pt lives with her friend Ryley in a single story home. Pt reported that prior to this hospital admission pt was independent with all ADLs and IADLs. Pt managed her own medication and was able to drive herself. Pt reported that there is no DME in her home.     Pt's friend Ryley is a good support for her. Pt has no financial concerns at this time and denies all substance use and mental health. Pt's preferred pharmacy is [facility] off of [address]    Information Source  Orientation : Oriented x 4  Information Given By: Patient  Informant's Name: Nafisa Hancock  Who is responsible for making decisions for patient? : Patient    Readmission Evaluation  Is this a readmission?: No    Elopement Risk  Legal Hold: No  Ambulatory or Self Mobile in Wheelchair: No-Not an Elopement Risk  Elopement Risk: Not at Risk for Elopement    Interdisciplinary Discharge Planning  Lives with - Patient's Self Care Capacity: Significant Other  Patient or legal guardian wants to designate a caregiver: No  Support Systems: Spouse / Significant Other  Housing / Facility: [address]  Prior Services: Home-Independent    Discharge Preparedness  What is your plan after discharge?: Other (comment)(IRF)  What are your discharge supports?: Partner  Prior Functional Level: Ambulatory, Drives Self, Independent with Activities of Daily Living, Independent with Medication Management  Difficulity with ADLs: None  Difficulity with IADLs: None    Functional Assesment  Prior Functional Level: Ambulatory, Drives Self, Independent with Activities of Daily Living, Independent with Medication Management    Finances  Financial Barriers to Discharge: No  Prescription Coverage: Yes    Vision / Hearing Impairment  Vision Impairment : No  Hearing Impairment : No    Advance Directive  Advance Directive?: None  Advance  Directive offered?: AD Booklet refused    Domestic Abuse  Have you ever been the victim of abuse or violence?: No  Physical Abuse or Sexual Abuse: No  Verbal Abuse or Emotional Abuse: No  Possible Abuse/Neglect Reported to:: Not Applicable    Psychological Assessment  History of Substance Abuse: None  History of Psychiatric Problems: No  Non-compliant with Treatment: No  Newly Diagnosed Illness: Yes    Discharge Risks or Barriers  Discharge risks or barriers?: No    Anticipated Discharge Information  Discharge Disposition: Disch to IP rehab facility or distinct part unit (62)

## 2021-02-22 NOTE — PREADMISSION SCREENING NOTE
Pre-Admission Screening Form    Patient Information:   Name: Nafisa Hancock     MRN: [id number]       : 1940      Age: 80 y.o.   Gender: female      Race: White [7]       Marital Status:  [5]  Family Contact: Ryley Segura        Relationship: Significant other [13]  Home Phone: [phone]           Cell Phone: [phone]  Advanced Directives: None  Code Status:  FULL  Current Attending Provider: Marisel Grover M.D.  Referring Physician: Dr. Grover   Physiatrist Consult: Dr. Dean    Referral Date: 21  Primary Payor Source:  [employer]  Secondary Payor Source:      Medical Information:   Date of Admission to Acute Care [place]2021  Room Number: T312/01  Rehabilitation Diagnosis: 0008.3 - Orthopaedic Disorders: Status Post Pelvic Fracture  Immunization History   Administered Date(s) Administered   • Influenza (IM) Preservative Free - HISTORICAL DATA 10/22/2010, 2013   • Influenza Seasonal Injectable - Historical Data 2014   • Influenza Vaccine Adult HD 2015, 2016, 2017, 2018, 2019, 2020   • Pneumococcal Conjugate Vaccine (Prevnar/PCV-13) 2015   • Pneumococcal Vaccine (UF) - HISTORICAL DATA 2006   • Pneumococcal polysaccharide vaccine (PPSV-23) 2006, 2016   • Tdap Vaccine 05/10/2012, 2021   • Zoster Vaccine Live (ZVL) (Zostavax) - HISTORICAL DATA 2016     Allergies   Allergen Reactions   • Nkda [No Known Drug Allergy]      Past Medical History:   Diagnosis Date   • Arthritis 2020    hands, feet   • CATARACT     bilateral phaco with IOL   • Hypertension 2020   • MEDICAL HOME 12   • OSTEOPOROSIS 2010   • Pain     right shoulder     Past Surgical History:   Procedure Laterality Date   • DILATION AND CURETTAGE N/A 2020    Procedure: DILATION AND CURETTAGE;  Surgeon: Thomas Lucero M.D.;  Location: SURGERY [facility];  Service: Gynecology   • SHOULDER  ARTHROSCOPY W/ ROTATOR CUFF REPAIR Right 3/16/2017    Procedure: SHOULDER ARTHROSCOPY W/ ROTATOR CUFF REPAIR;  Surgeon: Darleen Meza M.D.;  Location: SURGERY [facility];  Service:    • SHOULDER DECOMPRESSION ARTHROSCOPIC  3/16/2017    Procedure: SHOULDER DECOMPRESSION ARTHROSCOPIC - SUBACROMIAL, CUEVAS;  Surgeon: Darleen Meza M.D.;  Location: SURGERY [facility];  Service:    • CATARACT PHACO WITH IOL  7/25/2012    Performed by TEJ DOUGLAS at SURGERY SAME DAY [facility] ORS   • CATARACT PHACO WITH IOL  7/11/2012    Performed by TEJ DOUGLAS at SURGERY SAME DAY [facility] ORS   • COLONOSCOPY  2012   • SHOULDER DECOMPRESSION ARTHROSCOPIC  10/1/2009    Performed by DARLEEN MEZA at SURGERY [facility]   • SHOULDER ARTHROSCOPY W/ ROTATOR CUFF REPAIR  10/1/2009    Performed by DARLEEN MEZA at [facility]   • BREAST BIOPSY Left 2003    negative       History Leading to Admission, Conditions that Caused the Need for Rehab (CMS):     Dr. Vides H&P:  Chief Complaint   Patient presents with   • GLF       pt was walkng from kitchen to living room and slipped on the tile, -loc, -thinners, denies hitting head. skin tear to elbow and complaint of right hip pain   • Hypertension       per ems pt was 200's sbp in route, on arrival pt 216/100. pt states she takes lisinopril.          History of Presenting Illness  80 y.o. female who presented 2/17/2021 status post fall at home.  Patient reports she was walking from her den to her kitchen when her boots caught on the carpet and she fell and landed on her right hip as well as her right elbow.  Patient reports that she sustained a laceration on her right elbow, as well as experienced severe right hip pain that is worse on movement and palpation.  He denies any head trauma, LOC, seizure-like activity, bowel/bladder incontinence.  She denies any prior history of falls in the past.  Patient does report that the pain hurts too much to ambulate  currently.  Otherwise, no further complaints at this time.     ED: SBP elevated into the 200s to 190s.  WBC 15, sodium 133.  CT pelvis with right inferior/superior pubic rami fracture, right symphyseal fracture, and possible right sacral alar fracture anteriorly.  Patient also noted with hematoma adjacent to right bladder.  CT pelvis with and without contrast without bladder injury.  Right elbow XR unremarkable.  Patient was given fentanyl 50 mcg as well as a tetanus shot.    Assessment/Plan:  I anticipate this patient will require at least two midnights for appropriate medical management, necessitating inpatient admission.     * Pelvic fracture (HCC)- (present on admission)  Assessment & Plan  Pelvic pain status post ground-level mechanical fall at home  No signs of seizure or syncope  No obvious head trauma  Right elbow laceration, sutured in the ED  CT imaging as above  -C/W analgesics for symptom management  -Bed rest until evaluated by orthopedics  -Holding chemical VTE due to hematoma  -Ortho (Dr. Telles) consulted by ED, will see in the morning     Pelvic hematoma in female- (present on admission)  Assessment & Plan  Pelvic hematoma adjacent to the right bladder noted on CT imaging  Follow-up CT pelvis w/ and w/o Contrast without signs of bladder injury  -Hold chemical VTE  -Monitor CBC     HTN (hypertension)- (present on admission)  Assessment & Plan  SBP elevated 190s to 200s  Patient takes lisinopril 10 mg at home, states she has taken today's dose  Patient does report anxiety as well as pelvic pain  Likely combination of anxieties and pelvic pain  -C/W analgesics  -C/w home lisinopril 10mg qd  -Labetolol 10mg IV q4h PRN     Leukocytosis- (present on admission)  Assessment & Plan  Patient denies any obvious signs or symptoms of infection at this time  Possibly reactive due to severe pain  -F/U UA  -Monitor CBC     VTE: SCD, holding chemical VTE      Dr. Dean (Physiatry)  recommendations:  ASSESSMENT:  Patient is a 80 y.o. female admitted with mechanical GLF, landing on her right hip and elbow, found to have pelvic fracture and hematoma, managed non operatively.         #Rehab:   -Vitals: HTN, RA  -Insurance: [facility]  -Discharge support: significant other   -Reason for admission: Pelvic fracture (HCC)  -Medical complexity: pelvic fracture with hematoma, pain management, monitoring for bleed/anemia, HTN to 160-170s requiring active management; monitor recent hypokalemia, hyponatremia, and leukocytosis   -Rehab Impairment Code: 0008.3 - Orthopaedic Disorders: Status Post Pelvic Fracture  -When medically cleared and DC support verified, recommend submitting to insurance for inpatient rehab consideration     #Ortho: multiple closed pelvic fracture without disruption of pelvic ring; right rami and small anterior sacral fracture  -non operative  -WBAT  -Pain management     #Pelvic hematoma without signs of bladder injury  -monitor H/H     #CV: HTN   -Active management: -170s today  -PRN IV labetalol, Lisinopril     #Estradiol, medroxyprogesterone    #Pain: PRN Percocet  -consider muscle relaxants and/or gabapentin to help decrease opioid needs    #GI: Tums, Zofran      #Hyponatremia, hypokalemia, leukocytosis since admit  -improved, but still borderline, monitor      #Bowel: 1x on 2/20, senna s, miralax, milk of mg     #Bladder: luis removed  -voiding   -monitor urinary retention/PVR in setting of pelvic hematoma near the bladder     #DVT PPX:   -SCDs  -holding chemoprophylaxis due to pelvic hematoma     Dr. Telles (Surgery Orthopedic) recommendations:  Assessment:   1. Multiple closed fractures of pelvis without disruption of pelvic ring, initial encounter ([facility])            We discussed the diagnosis and findings with the patient at length.  We reviewed possible non operative and operative interventions and the risks and benefits of these.  Recommended nonoperative treatment of his  "pelvic fractures.  She had a chance to ask questions and all of these were answered to her satisfaction.           Plan:  Weightbearing as tolerated and range of motion as tolerated  Mobilize  PT/OT  Follow-up in clinic in 4 weeks    CT Pelvis 02-17-21:  1.  Right inferior and superior pubic rami fractures.  2.  Right symphyseal fracture  3.  Possible subtle right sacral alar fracture anteriorly  4.  Hematoma adjacent to the right bladder, recommend follow-up CT cystogram to exclude bladder injury.    Co-morbidities: See PMH  Potential Risk - Complications: Contractures, Deep Vein Thrombosis, Incontinence, Malnutrition, Pain, Pneumonia, Pressure Ulcer and Urinary Tract Infection  Level of Risk: High    Ongoing Medical Management Needed (Medical/Nursing Needs):   Patient Active Problem List    Diagnosis Date Noted   • Leukocytosis 02/18/2021   • Pelvic fracture (HCC) 02/18/2021   • Pelvic hematoma in female 02/18/2021   • Postmenopausal 02/05/2021   • Opiate dependence (HCC) 08/21/2019   • Dyslipidemia 11/15/2018   • HTN (hypertension) 02/23/2017   • Right shoulder pain 02/16/2017   • Arthritis 05/24/2011   • Osteoporosis, senile 06/18/2010     A & O    Current Vital Signs:   Temperature: 37.1 °C (98.7 °F) Pulse: 82 Respiration: 17 Blood Pressure : (!) 168/87(nurse notified)  Weight: (unable to obtain) Height: 165.1 cm (5' 5\")  Pulse Oximetry: 90 % O2 (LPM): 0      Completed Laboratory Reports:  No results for input(s): WBC, HEMOGLOBIN, HEMATOCRIT, PLATELETCT, SODIUM, POTASSIUM, BUN, CREATININE, ALBUMIN, GLUCOSE, POCGLUCOSE, INR in the last 72 hours.  Additional Labs: Not Applicable    Prior Living Situation:   Housing / Facility: [address]  Steps Into Home: 1  Steps In Home: 1  Lives with - Patient's Self Care Capacity: Significant Other  Equipment Owned: None    Prior Level of Function / Living Situation:   Physical Therapy: Prior Services: Home-Independent  Housing / Facility: 1 Story House  Steps Into Home: " 1  Steps In Home: 1  Bathroom Set up: Bathtub / Shower Combination  Equipment Owned: None  Lives with - Patient's Self Care Capacity: Significant Other  Bed Mobility: Independent  Transfer Status: Independent  Ambulation: Independent  Distance Ambulation (Feet): (community)  Assistive Devices Used: None  Stairs: Independent  Current Level of Function:   Gait Level Of Assist: Moderate Assist(progressed to Mathieu )  Assistive Device: Front Wheel Walker  Distance (Feet): 10  Deviation: Step To, Shuffled Gait, Decreased Heel Strike, Decreased Toe Off  # of Stairs Climbed: 0  Weight Bearing Status: WBAT RLE   Supine to Sit: Moderate Assist  Sit to Supine: (left up in chair )  Scooting: Minimal Assist  Comments: pt up in chair at end. ModA for trunk and BLE to reach EOB wih no bed features utilized   Sit to Stand: Minimal Assist  Bed, Chair, Wheelchair Transfer: Minimal Assist  Toilet Transfers: Refused  Transfer Method: Stand Step  Skilled Intervention: Verbal Cuing  Sitting in Chair: 10  Sitting Edge of Bed: 5 min  Standing: 10  Occupational Therapy:   Self Feeding: Independent  Grooming / Hygiene: Independent  Bathing: Independent  Dressing: Independent  Toileting: Independent  Medication Management: Independent  Laundry: Independent  Kitchen Mobility: Independent  Finances: Independent  Home Management: Independent  Shopping: Independent  Prior Level Of Mobility: Independent Without Device in Community  Driving / Transportation: Relatives / Others Provide Transportation  Prior Services: Home-Independent  Housing / Facility: [address]  Occupation (Pre-Hospital Vocational): Retired Due To Age  Leisure Interests: Gardening  Current Level of Function:   Upper Body Dressing: Supervision  Lower Body Dressing: Maximal Assist  Toileting: (NT-refused)  Speech Language Pathology:      Rehabilitation Prognosis/Potential: Good  Estimated Length of Stay: 7-10 days    Nursing:   Orientation : Oriented x  4  Continent    Scope/Intensity of Services Recommended:  Physical Therapy: 1.5 hr / day  5 days / week. Therapeutic Interventions Required: Maximize Endurance, Mobility, Strength and Safety  Occupational Therapy: 1.5 hr / day 5 days / week. Therapeutic Interventions Required: Maximize Self Care, ADLs, IADLs and Energy Conservation  Rehabilitation Nursin/7. Therapeutic Interventions Required: Monitor Pain, Skin, Wound(s), Vital Signs, Intake and Output, Labs, Safety and Family Training  Rehabilitation Physician: 3 - 5 days / week. Therapeutic Interventions Required: Medical Management    She requires 24-hour rehabilitation nursing to manage skin care, nutrition and fluid intake, pain control, safety and medication management. In addition, rehabilitation nursing will reiterate and reinforce therapy skills and equipment use, including ADLs, as well as provide education to the patient and family. Nafisa Hancock is willing to participate in and is able to tolerate the proposed plan of care.    Rehabilitation Goals and Plan (Expected frequency & duration of treatment in the IRF):   Return to the Community, Modified Independent Level of Care and Outpatient Support  Anticipated Date of Rehabilitation Admission: 21  Patient/Family oriented IRF level of care/facility/plan: Yes  Patient/Family willing to participate in IRF care/facility/plan: Yes  Patient able to tolerate IRF level of care proposed: Yes  Patient has potential to benefit IRF level of care proposed: Yes  Comments: Not Applicable    Special Needs or Precautions - Medical Necessity:  Safety Concerns/Precautions:  Fall Risk / High Risk for Falls, Balance and Bed / Chair Alarm  Pain Management  IV Site: Peripheral  Weight-bearing Status: As Tolerated  Current Medications:    Current Facility-Administered Medications Ordered in Epic   Medication Dose Route Frequency Provider Last Rate Last Admin   • calcium carbonate (TUMS) chewable tab 500-1,000  mg  500-1,000 mg Oral Q6HRS PRN Servando Tian M.D.   1,000 mg at 02/20/21 0421   • influenza Vac High-Dose Quad (Fluzone) injection 0.7 mL  0.7 mL Intramuscular Once PRN CATY VelaD.       • HYDROmorphone pf (DILAUDID) injection 0.5 mg  0.5 mg Intravenous Q4HRS PRN CLAUDIA CastellanosO.       • oxyCODONE-acetaminophen (PERCOCET) 5-325 MG per tablet 2 tablet  2 tablet Oral Q4HRS PRN CLAUDIA CastellanosO.   2 tablet at 02/22/21 0910   • oxyCODONE-acetaminophen (PERCOCET) 5-325 MG per tablet 1 tablet  1 tablet Oral Q4HRS PRN CLAUDIA CastellanosO.       • estradiol (ESTRACE) tablet 0.5 mg  0.5 mg Oral DAILY Gt Vides, M.DChristi   0.5 mg at 02/22/21 0433   • lisinopril (PRINIVIL) tablet 10 mg  10 mg Oral DAILY Gt Vides, M.D.   10 mg at 02/22/21 0434   • medroxyPROGESTERone (PROVERA) tablet 2.5 mg  2.5 mg Oral DAILY Gt Vides, M.D.   2.5 mg at 02/22/21 0434   • senna-docusate (PERICOLACE or SENOKOT S) 8.6-50 MG per tablet 2 tablet  2 tablet Oral BID Gt Vides, M.D.   2 tablet at 02/22/21 0432    And   • polyethylene glycol/lytes (MIRALAX) PACKET 1 Packet  1 Packet Oral QDAY PRN Gt Vides M.D.   1 Packet at 02/20/21 1638    And   • magnesium hydroxide (MILK OF MAGNESIA) suspension 30 mL  30 mL Oral QDAY PRN Gt Vides M.DChristi   30 mL at 02/20/21 2038    And   • bisacodyl (DULCOLAX) suppository 10 mg  10 mg Rectal QDAY PRN Gt Vides M.D.       • acetaminophen (Tylenol) tablet 650 mg  650 mg Oral Q6HRS PRN Gt Vides M.D.       • labetalol (NORMODYNE/TRANDATE) injection 10 mg  10 mg Intravenous Q4HRS PRN Gt D Ip, M.D.   10 mg at 02/21/21 0631   • ondansetron (ZOFRAN) syringe/vial injection 4 mg  4 mg Intravenous Q4HRS PRN Gt D Ip, M.D.   4 mg at 02/18/21 0200   • ondansetron (ZOFRAN ODT) dispertab 4 mg  4 mg Oral Q4HRS PRN Gt D Ip, M.D.         No current [facility]-ordered outpatient medications on file.     Diet:   DIET ORDERS (From admission to next 24h)     Start     Ordered    02/17/21 7393  Diet Order Diet: Regular  ALL  MEALS     Question:  Diet:  Answer:  Regular    02/17/21 8385                Anticipated Discharge Destination / Patient/Family Goal:  Destination: Home with Assistance Support System: S.O.  Anticipated home health services: OT and PT  Previously used HH service/ provider: Not Applicable  Anticipated DME Needs: [employer]  Outpatient Services: OT and PT  Alternative resources to address additional identified needs:     Pre-Screen Completed: 2/22/2021 10:18 AM Gold Westfall L.P.N.

## 2021-02-22 NOTE — DISCHARGE PLANNING
[facility]  Dr Dean consulted and recommend in pt rehab  I met with pt explaining referral for in pt rehab and she is in agreement. She is aware of 5 days in isolation before 1 visitor can see her @ IRF>     She confirms dc plan as follows:      Return home w/ spouse; SSH w/ no stairs to enter; +bath/shower combo.  PLOF:  Completely indep w/ all gait w/ out device; IADL's and ADL's.  Active .   Son/ family reside in [place].     Discussed w/ Annamarie PARMAR   Anticipate transfer to [facility] today.

## 2021-02-22 NOTE — DISCHARGE SUMMARY
Discharge Summary    CHIEF COMPLAINT ON ADMISSION  Chief Complaint   Patient presents with   • GLF     pt was walkng from kitchen to living room and slipped on the tile, -loc, -thinners, denies hitting head. skin tear to elbow and complaint of right hip pain   • Hypertension     per ems pt was 200's sbp in route, on arrival pt 216/100. pt states she takes lisinopril.        Reason for Admission  illness     CODE STATUS  Full Code    HPI & HOSPITAL COURSE  Nafisa Hancock is an 80 y.o. female who presented 2/17/2021 after ground level fall at home.  Patient reports she was walking from her den to her kitchen when her boots caught on the carpet and she fell and landed on her right hip as well as her right elbow.  Patient reports that she sustained a laceration on her right elbow, as well as experienced severe right hip pain that is worse on movement and palpation.  She was admitted for orthopedic surgery evaluation and pain control.  Recommendations were for nonoperative management.  Pain was well controlled on oral medications.  Sandoval catheter was removed and she had successful voiding trials.  She was evaluated by physical and Occupational Therapy and determined to be a good candidate for inpatient rehab.      Therefore, she is discharged in good and stable condition to an inpatient rehabilitation hospital.    The patient met 2-midnight criteria for an inpatient stay at the time of discharge.      FOLLOW UP ITEMS POST DISCHARGE  Hypertension  Pelvic fracture  Mobility    DISCHARGE DIAGNOSES  Principal Problem:    Pelvic fracture (HCC) POA: Yes  Active Problems:    HTN (hypertension) POA: Yes    Pelvic hematoma in female POA: Yes    Leukocytosis POA: Yes  Resolved Problems:    * No resolved hospital problems. *      FOLLOW UP  Future Appointments   Date Time Provider Department Center   8/6/2021  2:00 PM Rene Chang M.D. [facility] [address] Dale Telles M.D.  [address]  [place]  [id number] [phone]    In 3 weeks  For follow up    Rene Chang M.D.  [address]  [place] [id number] [phone]      For follow up      MEDICATIONS ON DISCHARGE     Medication List      START taking these medications      Instructions   bisacodyl 10 MG Supp  Commonly known as: DULCOLAX   Insert 1 Suppository into the rectum 1 time a day as needed (if magnesium hydroxide ineffective after 24 hours).  Dose: 10 mg     calcium carbonate 500 MG Chew  Commonly known as: TUMS   Chew 1-2 Tablets every 6 hours as needed.  Dose: 500-1,000 mg     labetalol 5 MG/ML Soln  Commonly known as: NORMODYNE/TRANDATE   Infuse 2 mL into a venous catheter every four hours as needed (for SBP>180 or DBP>110).  Dose: 10 mg     magnesium hydroxide 400 MG/5ML Susp  Commonly known as: MILK OF MAGNESIA   Take 30 mL by mouth 1 time a day as needed (if polyethylene glycol ineffective after 24 hours).  Dose: 30 mL     oxyCODONE-acetaminophen 5-325 MG Tabs  Commonly known as: PERCOCET   Take 1-2 Tablets by mouth every four hours as needed for up to 5 days.  Dose: 1-2 tablet     polyethylene glycol/lytes 17 g Pack  Commonly known as: MIRALAX   Take 1 Packet by mouth 1 time a day as needed (if sennosides and docusate ineffective after 24 hours).  Dose: 17 g     senna-docusate 8.6-50 MG Tabs  Commonly known as: PERICOLACE or SENOKOT S   Take 2 Tablets by mouth 2 Times a Day.  Dose: 2 tablet        CHANGE how you take these medications      Instructions   estradiol 0.5 MG tablet  What changed: how much to take  Commonly known as: ESTRACE   Take 1 tablet by mouth every day. Take 1 tablet by mouth every day  Dose: 0.5 mg     lisinopril 20 MG Tabs  What changed: how much to take  Commonly known as: PRINIVIL   Doctor's comments: This is correct dose.  Take 1 tablet by mouth every day.  Dose: 20 mg     medroxyPROGESTERone 2.5 MG Tabs  What changed: See the new instructions.  Commonly known as: PROVERA   Take 1 tablet by mouth every  day.  Dose: 2.5 mg     vitamin D 1000 Unit (25 mcg) Tabs  What changed:   · medication strength  · how much to take  Commonly known as: cholecalciferol   Take 1 tablet by mouth every day.  Dose: 1,000 Units        CONTINUE taking these medications      Instructions   Juni-Mag-Zinc-D Tabs   Take 1 tablet by mouth every day.  Dose: 1 tablet        STOP taking these medications    HYDROcodone/acetaminophen  MG Tabs  Commonly known as: NORCO     VITAMIN B-12 PO            Allergies  Allergies   Allergen Reactions   • Nkda [No Known Drug Allergy]        DIET  Orders Placed This Encounter   Procedures   • Diet Order Diet: Regular     Standing Status:   Standing     Number of Occurrences:   1     Order Specific Question:   Diet:     Answer:   Regular [1]       ACTIVITY  As tolerated.  Weight bearing as tolerated    LINES, DRAINS, AND WOUNDS  This is an automated list. Peripheral IVs will be removed prior to discharge.  Peripheral IV 02/17/21 20 G Right Forearm (Active)   Site Assessment Clean;Dry;Intact 02/22/21 0800   Dressing Type Transparent Film 02/22/21 0800   Line Status Flushed;Scrubbed the hub prior to access;Saline locked 02/22/21 0800   Dressing Status Clean;Dry;Intact 02/22/21 0800   Dressing Intervention N/A 02/22/21 0800   Infiltration Grading ([facility]) 0 02/22/21 0800   Phlebitis Scale ([facility] Only) 0 02/22/21 0800       Peripheral IV 02/17/21 20 G Left Antecubital (Active)   Site Assessment Clean;Dry;Intact 02/22/21 0800   Dressing Type Transparent 02/22/21 0800   Line Status Flushed;Scrubbed the hub prior to access;Saline locked 02/22/21 0800   Dressing Status Clean;Dry;Intact 02/22/21 0800   Dressing Intervention N/A 02/22/21 0800   Infiltration Grading ([facility]) 0 02/22/21 0800   Phlebitis Scale ([facility] Only) 0 02/22/21 0800       Wound 02/21/21 Arm Anterior Right skin tear from GLF (Active)   Site Assessment PATRICIA 02/22/21 0800   Periwound Assessment PATRICIA 02/22/21 0800   Margins PATRICIA 02/22/21  0800   Dressing Status Clean;Dry;Intact 02/22/21 0800       Peripheral IV 02/17/21 20 G Right Forearm (Active)   Site Assessment Clean;Dry;Intact 02/22/21 0800   Dressing Type Transparent Film 02/22/21 0800   Line Status Flushed;Scrubbed the hub prior to access;Saline locked 02/22/21 0800   Dressing Status Clean;Dry;Intact 02/22/21 0800   Dressing Intervention N/A 02/22/21 0800   Infiltration Grading ([facility]) 0 02/22/21 0800   Phlebitis Scale ([facility] Only) 0 02/22/21 0800       Peripheral IV 02/17/21 20 G Left Antecubital (Active)   Site Assessment Clean;Dry;Intact 02/22/21 0800   Dressing Type Transparent 02/22/21 0800   Line Status Flushed;Scrubbed the hub prior to access;Saline locked 02/22/21 0800   Dressing Status Clean;Dry;Intact 02/22/21 0800   Dressing Intervention N/A 02/22/21 0800   Infiltration Grading ([facility]) 0 02/22/21 0800   Phlebitis Scale ([facility] Only) 0 02/22/21 0800               MENTAL STATUS ON TRANSFER  Level of Consciousness: Alert  Orientation : Oriented x 4  Speech: Speech Clear    CONSULTATIONS  Orthopedic surgery  Physical medicine and rehabilitation    PROCEDURES  None    LABORATORY  Lab Results   Component Value Date    SODIUM 135 02/19/2021    POTASSIUM 4.1 02/19/2021    CHLORIDE 100 02/19/2021    CO2 22 02/19/2021    GLUCOSE 113 (H) 02/19/2021    BUN 10 02/19/2021    CREATININE 0.59 02/19/2021    CREATININE 0.75 06/22/2010    GLOMRATE >59 06/22/2010        Lab Results   Component Value Date    WBC 10.2 02/19/2021    HEMOGLOBIN 12.7 02/19/2021    HEMATOCRIT 38.8 02/19/2021    PLATELETCT 239 02/19/2021      CT-PELVIS WITH & W/O   Final Result         1.  Right pelvic fractures as previously described.   2.  No contrast leak from the bladder to suggest bladder injury.      DX-ELBOW-COMPLETE 3+ RIGHT   Final Result         1.  No acute traumatic bony injury.         CT-PELVIS W/O PLUS RECONS   Final Result         1.  Right inferior and superior pubic rami fractures.   2.   Right symphyseal fracture   3.  Possible subtle right sacral alar fracture anteriorly   4.  Hematoma adjacent to the right bladder, recommend follow-up CT cystogram to exclude bladder injury.      These findings were discussed with the patient's clinician, Jack Summers, on 2/17/2021 8:25 PM.         Total time of the discharge process exceeds 45 minutes.

## 2021-02-22 NOTE — PROGRESS NOTES
Called to give report to nurse at [facility] receiving patient. No answer. Extension left.     1455: Full report given to rehab nurse.

## 2021-02-22 NOTE — CONSULTS
Physical Medicine and Rehabilitation Consultation         Initial Consult      Initial Consultation Date: 2/22/2021  Consulting provider: Dr. Marisel Grover  Reason for consultation: assess for acute inpatient rehab appropriateness  LOS: 5 Day(s)      Chief complaint: pelvic fracture        HPI:   The patient is a 80 y.o.  female with a past medical history of HTN, osteoporosis;  who presented on 2/17/2021  7:33 PM with mechanical GLF, landing on her right hip and elbow, found to have pelvic fracture and hematoma, managed non operatively. Hospital course with pain management, hypertension, and bleeding/concern for anemia.         Social Hx:  Pre-morbidly, this patient lived in:   1 story home  With 1 steps to enter  Lives with spouse, who may be able to assist        Current level of function:   PT, 2/20: Mod A FWW x10 ft; Mod A bed mobility; Min A transfers  OT, 2/19: Min A transfers; Max A for LBD/ADLs        PMH:  Past Medical History:   Diagnosis Date   • Arthritis 04/16/2020    hands, feet   • CATARACT 2012    bilateral phaco with IOL   • Hypertension 04/16/2020   • MEDICAL HOME 11/07/12   • OSTEOPOROSIS 6/18/2010   • Pain     right shoulder         PSH:  Past Surgical History:   Procedure Laterality Date   • DILATION AND CURETTAGE N/A 4/20/2020    Procedure: DILATION AND CURETTAGE;  Surgeon: Thomas Lucero M.D.;  Location: [facility];  Service: Gynecology   • SHOULDER ARTHROSCOPY W/ ROTATOR CUFF REPAIR Right 3/16/2017    Procedure: SHOULDER ARTHROSCOPY W/ ROTATOR CUFF REPAIR;  Surgeon: James Meza M.D.;  Location: SURGERY [facility];  Service:    • SHOULDER DECOMPRESSION ARTHROSCOPIC  3/16/2017    Procedure: SHOULDER DECOMPRESSION ARTHROSCOPIC - SUBACROMIAL, CUEVAS;  Surgeon: James Meza M.D.;  Location: SURGERY [facility];  Service:    • CATARACT PHACO WITH IOL  7/25/2012    Performed by TEJ DOUGLAS at SURGERY SAME DAY [facility]   • CATARACT PHACO WITH IOL   "7/11/2012    Performed by TEJ DOUGLAS at SURGERY SAME DAY [facility] ORS   • COLONOSCOPY  2012   • SHOULDER DECOMPRESSION ARTHROSCOPIC  10/1/2009    Performed by DARLEEN EAGLE at SURGERY [facility] ORS   • SHOULDER ARTHROSCOPY W/ ROTATOR CUFF REPAIR  10/1/2009    Performed by DARLEEN EAGLE at SURGERY [facility] ORS   • BREAST BIOPSY Left 2003    negative         FHX: Reviewed.  Family History   Problem Relation Age of Onset   • Arthritis Mother    • Non-contributory Neg Hx                  Medications:  Current Facility-Administered Medications   Medication Dose   • calcium carbonate (TUMS) chewable tab 500-1,000 mg  500-1,000 mg   • influenza Vac High-Dose Quad (Fluzone) injection 0.7 mL  0.7 mL   • HYDROmorphone pf (DILAUDID) injection 0.5 mg  0.5 mg   • oxyCODONE-acetaminophen (PERCOCET) 5-325 MG per tablet 2 tablet  2 tablet   • oxyCODONE-acetaminophen (PERCOCET) 5-325 MG per tablet 1 tablet  1 tablet   • estradiol (ESTRACE) tablet 0.5 mg  0.5 mg   • lisinopril (PRINIVIL) tablet 10 mg  10 mg   • medroxyPROGESTERone (PROVERA) tablet 2.5 mg  2.5 mg   • senna-docusate (PERICOLACE or SENOKOT S) 8.6-50 MG per tablet 2 tablet  2 tablet    And   • polyethylene glycol/lytes (MIRALAX) PACKET 1 Packet  1 Packet    And   • magnesium hydroxide (MILK OF MAGNESIA) suspension 30 mL  30 mL    And   • bisacodyl (DULCOLAX) suppository 10 mg  10 mg   • acetaminophen (Tylenol) tablet 650 mg  650 mg   • labetalol (NORMODYNE/TRANDATE) injection 10 mg  10 mg   • ondansetron (ZOFRAN) syringe/vial injection 4 mg  4 mg   • ondansetron (ZOFRAN ODT) dispertab 4 mg  4 mg       Allergies:  Allergies   Allergen Reactions   • Nkda [No Known Drug Allergy]          Vitals: BP (!) 168/87   Pulse 82   Temp 37.1 °C (98.7 °F) (Temporal)   Resp 17   Ht 1.651 m (5' 5\")   Wt 59 kg (130 lb)   SpO2 90%           Labs: Reviewed and significant for 2/19: WBC 10, Hgb 12.7, Na 135, K4.1  No results for input(s): RBC, HEMOGLOBIN, " HEMATOCRIT, PLATELETCT, PROTHROMBTM, APTT, INR, IRON, FERRITIN, TOTIRONBC in the last 72 hours.      No results found for this or any previous visit (from the past 24 hour(s)).        Imaging:  CT-PELVIS W/O PLUS RECONS  Result Date: 2/17/2021 2/17/2021 8:05 PM HISTORY/REASON FOR EXAM: Pelvic fracture, follow up; fall with suspected pelvis fracture; Pain/Deformity Following Trauma TECHNIQUE/EXAM DESCRIPTION:  CT pelvis without IV contrast. Sequential axial CT images were obtained from the lower lumbar spine to the proximal femurs without contrast. Low dose optimization technique was utilized for this CT exam including automated exposure control and adjustment of the mA and/or kV according to patient size. COMPARISON: None FINDINGS: The visualized portions of the small bowel and colon appear within normal limits. Right pelvic sidewall hematoma is seen, there is deformity of the right bladder contour. Right inferior and superior pubic ramus fractures are seen. There is right symphyseal fracture noted. Possible subtle right sacral alar fracture anteriorly. The uterus and adnexal structures appear within physiologic limits.     1.  Right inferior and superior pubic rami fractures. 2.  Right symphyseal fracture 3.  Possible subtle right sacral alar fracture anteriorly 4.  Hematoma adjacent to the right bladder, recommend follow-up CT cystogram to exclude bladder injury. These findings were discussed with the patient's clinician, Jack Summers, on 2/17/2021 8:25 PM.      CT-PELVIS WITH & W/O  Result Date: 2/17/2021 2/17/2021 10:17 PM HISTORY/REASON FOR EXAM: Pelvic trauma, initial exam; cystogram rule out bladder injury TECHNIQUE/EXAM DESCRIPTION: CT pelvis without IV contrast. Transaxial MDCT scans of the abdomen and pelvis were obtained following retrograde filling of the bladder with 25 cc Omnipaque 350 IV contrast. Low dose optimization technique was utilized for this CT exam including automated exposure control  and adjustment of the mA and/or kV according to patient size. COMPARISON: Today at 2014 FINDINGS: CT PELVIS: Right superior and inferior pubic ramus fractures are seen. Right symphysis fracture is seen. Possible subtle fracture of the anterior right sacral ala. Limited views of the bowel appears grossly unremarkable. The uterus and adnexal structures appear within physiologic limits. Sandoval catheter is seen within the bladder. There is fluid tracking along the right pelvic sidewall and anterior to the bladder, compatible with fracture hematoma. There is no extraluminal contrast from the bladder seen.     1.  Right pelvic fractures as previously described. 2.  No contrast leak from the bladder to suggest bladder injury.            ASSESSMENT:  Patient is a 80 y.o. female admitted with mechanical GLF, landing on her right hip and elbow, found to have pelvic fracture and hematoma, managed non operatively.       #Rehab:   -Vitals: HTN, RA  -Insurance: [facility]  -Discharge support: significant other   -Reason for admission: Pelvic fracture (HCC)  -Medical complexity: pelvic fracture with hematoma, pain management, monitoring for bleed/anemia, HTN to 160-170s requiring active management; monitor recent hypokalemia, hyponatremia, and leukocytosis   -Rehab Impairment Code: 0008.3 - Orthopaedic Disorders: Status Post Pelvic Fracture  -When medically cleared and DC support verified, recommend submitting to insurance for inpatient rehab consideration    #Ortho: multiple closed pelvic fracture without disruption of pelvic ring; right rami and small anterior sacral fracture  -non operative  -WBAT  -Pain management     #Pelvic hematoma without signs of bladder injury  -monitor H/H    #CV: HTN   -Active management: -170s today  -PRN IV labetalol, Lisinopril    #Estradiol, medroxyprogesterone    #Pain: PRN Percocet  -consider muscle relaxants and/or gabapentin to help decrease opioid needs    #GI: Tums, Zofran     #Hyponatremia,  hypokalemia, leukocytosis since admit  -improved, but still borderline, monitor     #Bowel: 1x on 2/20, senna s, miralax, milk of mg    #Bladder: luis removed  -voiding   -monitor urinary retention/PVR in setting of pelvic hematoma near the bladder    #DVT PPX:   -SCDs  -holding chemoprophylaxis due to pelvic hematoma          Thank you for allowing us to participate in the care of this patient.             Víctor Dean MD  Physical Medicine and Rehabilitation   2/22/2021

## 2021-02-22 NOTE — CARE PLAN
Problem: Safety  Goal: Will remain free from injury  Outcome: PROGRESSING AS EXPECTED  Note: Fall precautions in place. Waffle cushion in use. Patient able to reposition self independently. Bed locked and placed in lowest position. Call light at the bedside. Personal belongings within reach. Hourly monitoring in place. Patient comfortably resting in bed.      Problem: Pain Management  Goal: Pain level will decrease to patient's comfort goal  Outcome: PROGRESSING AS EXPECTED  Note: Pain rating pain 4/10, pain well controlled.      Problem: Skin Integrity  Goal: Risk for impaired skin integrity will decrease  Outcome: PROGRESSING AS EXPECTED  Note: Waffle cushion in place

## 2021-02-22 NOTE — DISCHARGE PLANNING
Agency/Facility Name: [facility]  Referral sent per [facility] form @ [phone]     [facility] Declined patient.

## 2021-02-23 LAB
25(OH)D3 SERPL-MCNC: 70 NG/ML (ref 30–100)
ALBUMIN SERPL BCP-MCNC: 3.9 G/DL (ref 3.2–4.9)
ALBUMIN/GLOB SERPL: 1.3 G/DL
ALP SERPL-CCNC: 63 U/L (ref 30–99)
ALT SERPL-CCNC: 19 U/L (ref 2–50)
ANION GAP SERPL CALC-SCNC: 10 MMOL/L (ref 7–16)
AST SERPL-CCNC: 24 U/L (ref 12–45)
BASOPHILS # BLD AUTO: 0.7 % (ref 0–1.8)
BASOPHILS # BLD: 0.06 K/UL (ref 0–0.12)
BILIRUB SERPL-MCNC: 0.6 MG/DL (ref 0.1–1.5)
BUN SERPL-MCNC: 23 MG/DL (ref 8–22)
CALCIUM SERPL-MCNC: 9.1 MG/DL (ref 8.5–10.5)
CHLORIDE SERPL-SCNC: 100 MMOL/L (ref 96–112)
CO2 SERPL-SCNC: 26 MMOL/L (ref 20–33)
CREAT SERPL-MCNC: 0.62 MG/DL (ref 0.5–1.4)
EOSINOPHIL # BLD AUTO: 0.23 K/UL (ref 0–0.51)
EOSINOPHIL NFR BLD: 2.5 % (ref 0–6.9)
ERYTHROCYTE [DISTWIDTH] IN BLOOD BY AUTOMATED COUNT: 45.2 FL (ref 35.9–50)
EST. AVERAGE GLUCOSE BLD GHB EST-MCNC: 117 MG/DL
GLOBULIN SER CALC-MCNC: 3 G/DL (ref 1.9–3.5)
GLUCOSE SERPL-MCNC: 108 MG/DL (ref 65–99)
HBA1C MFR BLD: 5.7 % (ref 0–5.6)
HCT VFR BLD AUTO: 38.9 % (ref 37–47)
HGB BLD-MCNC: 12.8 G/DL (ref 12–16)
IMM GRANULOCYTES # BLD AUTO: 0.04 K/UL (ref 0–0.11)
IMM GRANULOCYTES NFR BLD AUTO: 0.4 % (ref 0–0.9)
LYMPHOCYTES # BLD AUTO: 1.91 K/UL (ref 1–4.8)
LYMPHOCYTES NFR BLD: 20.9 % (ref 22–41)
MAGNESIUM SERPL-MCNC: 2.3 MG/DL (ref 1.5–2.5)
MCH RBC QN AUTO: 30 PG (ref 27–33)
MCHC RBC AUTO-ENTMCNC: 32.9 G/DL (ref 33.6–35)
MCV RBC AUTO: 91.3 FL (ref 81.4–97.8)
MONOCYTES # BLD AUTO: 0.71 K/UL (ref 0–0.85)
MONOCYTES NFR BLD AUTO: 7.8 % (ref 0–13.4)
NEUTROPHILS # BLD AUTO: 6.19 K/UL (ref 2–7.15)
NEUTROPHILS NFR BLD: 67.7 % (ref 44–72)
NRBC # BLD AUTO: 0 K/UL
NRBC BLD-RTO: 0 /100 WBC
PHOSPHATE SERPL-MCNC: 3.2 MG/DL (ref 2.5–4.5)
PLATELET # BLD AUTO: 320 K/UL (ref 164–446)
PMV BLD AUTO: 9.8 FL (ref 9–12.9)
POTASSIUM SERPL-SCNC: 3.7 MMOL/L (ref 3.6–5.5)
PROT SERPL-MCNC: 6.9 G/DL (ref 6–8.2)
RBC # BLD AUTO: 4.26 M/UL (ref 4.2–5.4)
SODIUM SERPL-SCNC: 136 MMOL/L (ref 135–145)
TSH SERPL DL<=0.005 MIU/L-ACNC: 1.61 UIU/ML (ref 0.38–5.33)
WBC # BLD AUTO: 9.1 K/UL (ref 4.8–10.8)

## 2021-02-23 PROCEDURE — 97166 OT EVAL MOD COMPLEX 45 MIN: CPT

## 2021-02-23 PROCEDURE — 99223 1ST HOSP IP/OBS HIGH 75: CPT | Mod: AI | Performed by: PHYSICAL MEDICINE & REHABILITATION

## 2021-02-23 PROCEDURE — 82306 VITAMIN D 25 HYDROXY: CPT

## 2021-02-23 PROCEDURE — 84443 ASSAY THYROID STIM HORMONE: CPT

## 2021-02-23 PROCEDURE — 80053 COMPREHEN METABOLIC PANEL: CPT

## 2021-02-23 PROCEDURE — 97116 GAIT TRAINING THERAPY: CPT

## 2021-02-23 PROCEDURE — 97530 THERAPEUTIC ACTIVITIES: CPT

## 2021-02-23 PROCEDURE — 97162 PT EVAL MOD COMPLEX 30 MIN: CPT

## 2021-02-23 PROCEDURE — 97535 SELF CARE MNGMENT TRAINING: CPT

## 2021-02-23 PROCEDURE — 770010 HCHG ROOM/CARE - REHAB SEMI PRIVAT*

## 2021-02-23 PROCEDURE — 700102 HCHG RX REV CODE 250 W/ 637 OVERRIDE(OP): Performed by: PHYSICAL MEDICINE & REHABILITATION

## 2021-02-23 PROCEDURE — 84100 ASSAY OF PHOSPHORUS: CPT

## 2021-02-23 PROCEDURE — 36415 COLL VENOUS BLD VENIPUNCTURE: CPT

## 2021-02-23 PROCEDURE — 85025 COMPLETE CBC W/AUTO DIFF WBC: CPT

## 2021-02-23 PROCEDURE — 83735 ASSAY OF MAGNESIUM: CPT

## 2021-02-23 PROCEDURE — A9270 NON-COVERED ITEM OR SERVICE: HCPCS | Performed by: PHYSICAL MEDICINE & REHABILITATION

## 2021-02-23 PROCEDURE — 83036 HEMOGLOBIN GLYCOSYLATED A1C: CPT

## 2021-02-23 RX ORDER — LISINOPRIL 5 MG/1
15 TABLET ORAL DAILY
Status: DISCONTINUED | OUTPATIENT
Start: 2021-02-23 | End: 2021-02-25

## 2021-02-23 RX ADMIN — OXYCODONE HYDROCHLORIDE AND ACETAMINOPHEN 2 TABLET: 5; 325 TABLET ORAL at 04:11

## 2021-02-23 RX ADMIN — OXYCODONE HYDROCHLORIDE AND ACETAMINOPHEN 2 TABLET: 5; 325 TABLET ORAL at 18:10

## 2021-02-23 RX ADMIN — OXYCODONE HYDROCHLORIDE AND ACETAMINOPHEN 2 TABLET: 5; 325 TABLET ORAL at 22:12

## 2021-02-23 RX ADMIN — SENNOSIDES AND DOCUSATE SODIUM 2 TABLET: 8.6; 5 TABLET ORAL at 09:16

## 2021-02-23 RX ADMIN — OXYCODONE HYDROCHLORIDE AND ACETAMINOPHEN 2 TABLET: 5; 325 TABLET ORAL at 09:22

## 2021-02-23 RX ADMIN — TRAZODONE HYDROCHLORIDE 50 MG: 50 TABLET ORAL at 22:19

## 2021-02-23 RX ADMIN — MEDROXYPROGESTERONE ACETATE 2.5 MG: 2.5 TABLET ORAL at 09:16

## 2021-02-23 RX ADMIN — LISINOPRIL 15 MG: 5 TABLET ORAL at 09:22

## 2021-02-23 RX ADMIN — OXYCODONE HYDROCHLORIDE AND ACETAMINOPHEN 2 TABLET: 5; 325 TABLET ORAL at 12:43

## 2021-02-23 RX ADMIN — ESTRADIOL 0.5 MG: 1 TABLET ORAL at 09:16

## 2021-02-23 ASSESSMENT — BRIEF INTERVIEW FOR MENTAL STATUS (BIMS)
WHAT MONTH IS IT: ACCURATE WITHIN 5 DAYS
BIMS SUMMARY SCORE: 13
ASKED TO RECALL SOCK: YES, NO CUE REQUIRED
INITIAL REPETITION OF BED BLUE SOCK - FIRST ATTEMPT: 3
ASKED TO RECALL BED: NO, COULD NOT RECALL
WHAT DAY OF THE WEEK IS IT: CORRECT
ASKED TO RECALL BLUE: YES, NO CUE REQUIRED
WHAT YEAR IS IT: CORRECT

## 2021-02-23 ASSESSMENT — GAIT ASSESSMENTS
DISTANCE (FEET): 10
DISTANCE (FEET): 12
GAIT LEVEL OF ASSIST: MINIMAL ASSIST
GAIT LEVEL OF ASSIST: MINIMAL ASSIST
DEVIATION: ANTALGIC;STEP TO;DECREASED BASE OF SUPPORT;BRADYKINETIC;DECREASED HEEL STRIKE;DECREASED TOE OFF
DEVIATION: ANTALGIC;STEP TO;DECREASED BASE OF SUPPORT;BRADYKINETIC;DECREASED HEEL STRIKE;DECREASED TOE OFF
ASSISTIVE DEVICE: FRONT WHEEL WALKER
ASSISTIVE DEVICE: FRONT WHEEL WALKER

## 2021-02-23 ASSESSMENT — ACTIVITIES OF DAILY LIVING (ADL)
BED_CHAIR_WHEELCHAIR_TRANSFER_DESCRIPTION: ADAPTIVE EQUIPMENT;INCREASED TIME;SET-UP OF EQUIPMENT;SUPERVISION FOR SAFETY;VERBAL CUEING
TOILETING: INDEPENDENT
BED_CHAIR_WHEELCHAIR_TRANSFER_DESCRIPTION: ADAPTIVE EQUIPMENT;INCREASED TIME;INITIAL PREPARATION FOR TASK;SET-UP OF EQUIPMENT;SUPERVISION FOR SAFETY;VERBAL CUEING
TOILET_TRANSFER_DESCRIPTION: GRAB BAR;INCREASED TIME;SET-UP OF EQUIPMENT;SUPERVISION FOR SAFETY;VERBAL CUEING
BED_CHAIR_WHEELCHAIR_TRANSFER_DESCRIPTION: INCREASED TIME;SET-UP OF EQUIPMENT;SUPERVISION FOR SAFETY;VERBAL CUEING

## 2021-02-23 ASSESSMENT — ENCOUNTER SYMPTOMS
CONSTIPATION: 0
SORE THROAT: 0
CHILLS: 0
BRUISES/BLEEDS EASILY: 0
COUGH: 0
PALPITATIONS: 0
SHORTNESS OF BREATH: 0
DIARRHEA: 0
DEPRESSION: 0
FEVER: 0
FALLS: 0
MEMORY LOSS: 0

## 2021-02-23 ASSESSMENT — PAIN DESCRIPTION - PAIN TYPE
TYPE: ACUTE PAIN

## 2021-02-23 NOTE — THERAPY
"Occupational Therapy   Initial Evaluation     Patient Name: Nafisa Hancock  Age:  80 y.o., Sex:  female  Medical Record #: [id number]  Today's Date: 2/23/2021     Subjective    Pt reports she feels her thinking skills are WFL and doesn't feel like people should be worried about her or her 's cognition. Pt mentioned someone has mentioned her  going in to memory care which she does not think is necessary.     Objective       02/23/21 0701   Prior Living Situation   Prior Services Home-Independent   Housing / Facility [address] [facility]   Steps Into Home 1  (threshold)   Steps In Home 0   Rail None   Bathroom Set up Bathtub / Shower Combination;Tub Transfer Bench  (pt reports \"porcelain grab bars\" although unclear)   Equipment Owned Front-Wheel Walker;Tub Transfer Bench;Raised Toilet Seat Without Arms   Lives with - Patient's Self Care Capacity Spouse  (Ryley (been together for 18 yrs, first spouse passed away))   Prior Level of ADL Function   Self Feeding Independent   Grooming / Hygiene Independent   Bathing Independent   Dressing Independent   Toileting Independent   Prior Level of IADL Function   Medication Management Independent   Laundry Independent   Kitchen Mobility Independent   Finances Independent   Home Management Independent   Shopping Independent   Prior Level Of Mobility Independent Without Device in Community   Driving / Transportation Relatives / Others Provide Transportation   Occupation (Pre-Hospital Vocational) Retired Due To Age  (Retired  inside [employer]   Leisure Interests Gardening   Prior Functioning: Everyday Activities   Self Care Independent   Indoor Mobility (Ambulation) Independent   Stairs Independent   Functional Cognition Independent   Prior Device Use None of the given options   Pain 0 - 10 Group   Location Pelvis   Location Orientation Mid;Right;Inner;Outer   Pain Rating Scale (NPRS) 8   Description Aching   Comfort Goal Perform Activity;Sleep " "Comfortably;Stay Alert   Cognition    Level of Consciousness Alert   Cognitive Pattern Assessment   Cognitive Pattern Assessment Used BIMS   Brief Interview for Mental Status (BIMS)   Repetition of Three Words (First Attempt) 3   Temporal Orientation: Year Correct   Temporal Orientation: Month Accurate within 5 days   Temporal Orientation: Day Correct   Recall: \"Sock\" Yes, no cue required   Recall: \"Blue\" Yes, no cue required   Recall: \"Bed\" No, could not recall   BIMS Summary Score 13   Active ROM Upper Body   Active ROM Upper Body  WDL   Comments reports bilateral RTC tears, however, shoulder flexion/abduction WFL. elbow, wrist and digits also WFL.   Eating   Assistance Needed Set-up / clean-up   Physical Assistance Level No physical assistance   CARE Score 5   Eating Discharge Goal   Discharge Goal 6   Oral Hygiene   Assistance Needed Set-up / clean-up;Supervision;Verbal cues   Physical Assistance Level No physical assistance   CARE Score 4   Oral Hygiene Discharge Goal   Discharge Goal 6   Shower/Bathe Self   Reason if not Attempted Medical concerns  (Antalgic, too high pain levels)   CARE Score 88   Shower/Bathe Self Discharge Goal   Discharge Goal 4   Upper Body Dressing   Assistance Needed Supervision;Set-up / clean-up   Physical Assistance Level No physical assistance   CARE Score 4   Upper Body Dressing Discharge Goal   Discharge Goal 6   Lower Body Dressing   Assistance Needed Physical assistance;Adaptive equipment;Set-up / clean-up;Supervision   Physical Assistance Level 76% or more   CARE Score 2   Lower Body Dressing Discharge Goal   Discharge Goal 4   Putting On/Taking Off Footwear   Assistance Needed Physical assistance   Physical Assistance Level Total assistance   CARE Score 1   Putting On/Taking Off Footwear Discharge Goal   Discharge Goal 5   Toileting Hygiene   Assistance Needed Physical assistance;Adaptive equipment;Set-up / clean-up;Supervision;Verbal cues   Physical Assistance Level 26%-50% "   CARE Score 3   Toileting Hygiene Discharge Goal   Discharge Goal 6   Toilet Transfer   Assistance Needed Incidental touching;Adaptive equipment   Physical Assistance Level 25% or less   CARE Score 3   Toilet Transfer Discharge Goal   Discharge Goal 6   Hearing, Speech, and Vision   Expression of Ideas and Wants Without difficulty   Understanding Verbal and Non-Verbal Content Understands   Functional Level of Assist   Eating Supervision   Eating Description Set-up of equipment or meal/tube feeding   Grooming Seated;Supervision   Grooming Description Set-up of equipment;Supervision for safety;Verbal cueing;Adaptive equipment   Bathing   (not completed due to high pain levels and antalgic)   Upper Body Dressing Supervision   Upper Body Dressing Description Increased time;Set-up of equipment;Supervision for safety;Verbal cueing   Lower Body Dressing Maximal Assist   Lower Body Dressing Description Grab bar;Assist with threading into pant leg;Set-up of equipment;Supervision for safety;Verbal cueing  (assist with pants over hips)   Toileting Minimal Assist   Toileting Description Assist to pull pants up;Grab bar;Increased time;Set-up of equipment;Supervision for safety;Verbal cueing   Bed, Chair, Wheelchair Transfer Maximal Assist   Bed Chair Wheelchair Transfer Description Increased time;Set-up of equipment;Supervision for safety;Verbal cueing  (stand pivot transfer; prompts for pt to not grab OT's neck)   Toilet Transfers Contact Guard Assist   Toilet Transfer Description Grab bar;Increased time;Set-up of equipment;Supervision for safety;Verbal cueing   Tub / Shower Transfers Unable to Participate   Tub Shower Transfer Description   (antalgic, too high of pain)   Problem List   Problem List Decreased Active Daily Living Skills;Decreased Homemaking Skills;Decreased Upper Extremity Strength Right;Decreased Upper Extremity Strength Left;Decreased Functional Mobility;Decreased Activity Tolerance;Safety Awareness Deficits  / Cognition;Impaired Postural Control / Balance;Decreased Upper Extremity AROM Right;Decreased Upper Extremity AROM Left   Precautions   Precautions Fall Risk;Weight Bearing As Tolerated Right Lower Extremity   Comments Antalgic; needs significantly more time to complete transfers   Current Discharge Plan   Current Discharge Plan Return to Prior Living Situation   Benefit    Therapy Benefit Patient Would Benefit from Inpatient Rehab Occupational Therapy to Maximize [place] with ADLs, IADLs and Functional Mobility.   Interdisciplinary Plan of Care Collaboration   IDT Collaboration with  Nursing   Patient Position at End of Therapy Seated;Self Releasing Lap Belt Applied;Call Light within Reach;Tray Table within Reach;Phone within Reach   Collaboration Comments re: high pain levels causing pt to be unable to shower this date, antalgia   Equipment Needs   Assistive Device / DME Front-Wheel Walker;Grab Bars In Shower / Tub;Grab Bars By Toilet   Adaptive Equipment Reacher;Sock Aide;Dressing Stick;Long Handled Shoe Horn;Long Handled Sponge   Strengths & Barriers   Strengths Alert and oriented;Independent prior level of function;Motivated for self care and independence;Pleasant and cooperative;Supportive family;Willingly participates in therapeutic activities   Barriers Decreased endurance;Difficulty following instructions;Generalized weakness;Impaired activity tolerance;Impaired appetite/intake;Impaired balance;Limited mobility;Pain poorly managed   OT Total Time Spent   OT Individual Total Time Spent (Mins) 90   OT Charge Group   OT Self Care / ADL 2   OT Therapy Activity 1   OT Evaluation OT Evaluation Mod     OTR educated pt on the [facility] [place] program. OTR explained each discipline briefly and then explained occupational therapy's role in more detail. OTR answered questions pt had about pt's stay and educated pt on expectations at rehab.     Assessment  Patient is 80 y.o. female with a diagnosis of R  pelvic fractures from GLF in her home. Pt was treated non-operatively and is now WBAT RLE. The therapists at [facility] noted altered cognition, however, this may be pain medication related. Pt does not currently seem to need an SLP consult, but OTR will continue to evaluate to determine if there is a need. Additional factors influencing patient status / progress (ie: cognitive factors, co-morbidities, social support, etc): Pt was independent prior to fall. Pt already had hypertension and chronic pain. Pt reports she took 4 hydrocodone a day, unclear how many mg.    Plan  Recommend Occupational Therapy  minutes per day 5-7 days per week for 10-14  days for the following treatments:  OT Group Therapy, OT Self Care/ADL, OT Cognitive Skill Dev, OT Community Reintegration, OT Neuro Re-Ed/Balance, OT Therapeutic Activity, OT Evaluation and OT Therapeutic Exercise.    Goals:  Long term and short term goals have been discussed with patient and they are in agreement.    Occupational Therapy Goals     Problem: Bathing     Dates: Start: 02/23/21       Goal: STG-Within one week, patient will bathe     Dates: Start: 02/23/21       Description: 1) Individualized Goal:  with Mod A and AE as needed  2) Interventions:  OT Group Therapy, OT Self Care/ADL, OT Cognitive Skill Dev, OT Community Reintegration, OT Manual Ther Technique, OT Neuro Re-Ed/Balance, OT Therapeutic Activity, OT Evaluation, and OT Therapeutic Exercise                   Problem: Dressing     Dates: Start: 02/23/21       Goal: STG-Within one week, patient will dress LB     Dates: Start: 02/23/21       Description: 1) Individualized Goal:  with Mod A and AE as needed  2) Interventions:  OT Group Therapy, OT Self Care/ADL, OT Cognitive Skill Dev, OT Community Reintegration, OT Manual Ther Technique, OT Neuro Re-Ed/Balance, OT Therapeutic Activity, OT Evaluation, and OT Therapeutic Exercise                     Problem: OT Long Term Goals     Dates: Start: 02/23/21        Goal: LTG-By discharge, patient will complete basic self care tasks     Dates: Start: 02/23/21       Description: 1) Individualized Goal:  with CGA and AE as needed  2) Interventions:  OT Group Therapy, OT Self Care/ADL, OT Cognitive Skill Dev, OT Community Reintegration, OT Manual Ther Technique, OT Neuro Re-Ed/Balance, OT Therapeutic Activity, OT Evaluation, and OT Therapeutic Exercise               Goal: LTG-By discharge, patient will perform bathroom transfers     Dates: Start: 02/23/21       Description: 1) Individualized Goal:  with CGA and AE as needed  2) Interventions:  OT Group Therapy, OT Self Care/ADL, OT Cognitive Skill Dev, OT Community Reintegration, OT Manual Ther Technique, OT Neuro Re-Ed/Balance, OT Therapeutic Activity, OT Evaluation, and OT Therapeutic Exercise                Problem: Toileting     Dates: Start: 02/23/21       Goal: STG-Within one week, patient will complete toileting tasks     Dates: Start: 02/23/21       Description: 1) Individualized Goal:  with CGA and AE as needed  2) Interventions:  OT Group Therapy, OT Self Care/ADL, OT Cognitive Skill Dev, OT Community Reintegration, OT Manual Ther Technique, OT Neuro Re-Ed/Balance, OT Therapeutic Activity, OT Evaluation, and OT Therapeutic Exercise

## 2021-02-23 NOTE — FLOWSHEET NOTE
02/22/21 1620   Patient History   Pulmonary Diagnosis no   Procedures Relevant to Respiratory Status none   Home O2 No   Nocturnal CPAP No   Home Treatments/Frequency No   Sleep Apnea Screening   Have you had a sleep study? No   Have you been diagnosed with sleep apnea? No   S - Have you been told that you SNORE? 0   T - Are you often TIRED during the day? 0   O - Do you know if you stop breathing or has anyone witnessed you stop breathing while you were asleep? (OBSTRUCTION) 0   P - Do you have high blood PRESSURE or on medication to control high blood pressure? 1   B - Is your Body Mass Index greater than 35? (BMI) 0   A - Are you 50 years old or older? (AGE) 1   N - Are you a male with a NECK circumference greater than 17 inches, or a female with a neck circumference greater than 16 inches? 0   G - Are you male? (GENDER) 0   Stop Bang Total Score 2

## 2021-02-23 NOTE — H&P
REHABILITATION HISTORY AND PHYSICAL/POST ADMISSION PHYSICAL EVALUATION    Nafisa Hancock  RH32/02    [facility] Code / Diagnosis to Support: 0008.3 - Orthopaedic Disorders: Status Post Pelvic Fracture  Etiologic Diagnosis: Pelvic fracture (HCC)    CC: Difficulty moving, hip pain    HPI:  Per the history and physical from patient's admission:   80 y.o. female who presented 2/17/2021 status post fall at home.  Patient reports she was walking from her den to her kitchen when her boots caught on the carpet and she fell and landed on her right hip as well as her right elbow.  Patient reports that she sustained a laceration on her right elbow, as well as experienced severe right hip pain that is worse on movement and palpation.  He denies any head trauma, LOC, seizure-like activity, bowel/bladder incontinence.  She denies any prior history of falls in the past.  Patient does report that the pain hurts too much to ambulate currently.  Otherwise, no further complaints at this time.    ED: SBP elevated into the 200s to 190s.  WBC 15, sodium 133.  CT pelvis with right inferior/superior pubic rami fracture, right symphyseal fracture, and possible right sacral alar fracture anteriorly.  Patient also noted with hematoma adjacent to right bladder.  CT pelvis with and without contrast without bladder injury.  Right elbow XR unremarkable.  Patient was given fentanyl 50 mcg as well as a tetanus shot.  Orthopedic team was consulted and nonoperative management was recommended.    Patient was evaluated by Rehab Medicine physician and Physical Therapy and Occupational Therapy and determined to be appropriate for acute inpatient rehab and was transferred to [facility] on 2/22.  On admission patient reports that she is doing okay.  She is excited to get home but understands that she needs rehab prior to that.  She denies any significant pain in the pelvis though she does have mild pain currently controlled at this time  and states that she is followed by pain management through [facility].  Denies any bladder or bowel issues.  States she was a very active person prior to admission    Pre-mobidly, the patient lived in a single level home with significant other.  With this acute therapeutic intervention, this patient hopes to improve her functional status, and return to independent living with the supportive care of signigicant other.      REVIEW OF SYSTEMS:     Review of Systems   Constitutional: Negative for chills and fever.   HENT: Negative for congestion and sore throat.    Respiratory: Negative for cough and shortness of breath.    Cardiovascular: Negative for palpitations and leg swelling.   Gastrointestinal: Negative for constipation and diarrhea.   Genitourinary: Negative for dysuria, frequency and urgency.   Musculoskeletal: Positive for joint pain ( Pelvis). Negative for falls.   Endo/Heme/Allergies: Does not bruise/bleed easily.   Psychiatric/Behavioral: Negative for depression and memory loss.     PMH:  Past Medical History:   Diagnosis Date   • Arthritis 04/16/2020    hands, feet   • CATARACT 2012    bilateral phaco with IOL   • Hypertension 04/16/2020   • MEDICAL HOME 11/07/12   • OSTEOPOROSIS 6/18/2010   • Pain     right shoulder       PSH:  Past Surgical History:   Procedure Laterality Date   • DILATION AND CURETTAGE N/A 4/20/2020    Procedure: DILATION AND CURETTAGE;  Surgeon: Thomas Lucero M.D.;  Location: [facility];  Service: Gynecology   • SHOULDER ARTHROSCOPY W/ ROTATOR CUFF REPAIR Right 3/16/2017    Procedure: SHOULDER ARTHROSCOPY W/ ROTATOR CUFF REPAIR;  Surgeon: James Meza M.D.;  Location: [facility];  Service:    • SHOULDER DECOMPRESSION ARTHROSCOPIC  3/16/2017    Procedure: SHOULDER DECOMPRESSION ARTHROSCOPIC - SUBACROMIAL, CUEVAS;  Surgeon: James Meza M.D.;  Location: [facility];  Service:    • CATARACT PHACO WITH IOL  7/25/2012    Performed by MISAEL  TEJ REYNOLDS at SURGERY SAME DAY [facility] ORS   • CATARACT PHACO WITH IOL  7/11/2012    Performed by TEJ DOUGLAS at SURGERY SAME DAY [facility] ORS   • COLONOSCOPY  2012   • SHOULDER DECOMPRESSION ARTHROSCOPIC  10/1/2009    Performed by DARLEEN EAGLE at SURGERY [facility] ORS   • SHOULDER ARTHROSCOPY W/ ROTATOR CUFF REPAIR  10/1/2009    Performed by DARLEEN EAGLE at SURGERY [facility] ORS   • BREAST BIOPSY Left 2003    negative       FAMILY HISTORY:  Family history was reviewed with the patient, there is no pertinent family history to report.       MEDICATIONS:  Current Facility-Administered Medications   Medication Dose   • lisinopril (PRINIVIL) tablet 15 mg  15 mg   • senna-docusate (PERICOLACE or SENOKOT S) 8.6-50 MG per tablet 2 tablet  2 tablet    And   • polyethylene glycol/lytes (MIRALAX) PACKET 1 Packet  1 Packet    And   • magnesium hydroxide (MILK OF MAGNESIA) suspension 30 mL  30 mL    And   • bisacodyl (DULCOLAX) suppository 10 mg  10 mg   • acetaminophen (Tylenol) tablet 650 mg  650 mg   • estradiol (ESTRACE) tablet 0.5 mg  0.5 mg   • medroxyPROGESTERone (PROVERA) tablet 2.5 mg  2.5 mg   • oxyCODONE-acetaminophen (PERCOCET) 5-325 MG per tablet 1 tablet  1 tablet   • oxyCODONE-acetaminophen (PERCOCET) 5-325 MG per tablet 2 tablet  2 tablet   • Respiratory Therapy Consult     • Pharmacy Consult Request ...Pain Management Review 1 Each  1 Each   • hydrALAZINE (APRESOLINE) tablet 25 mg  25 mg   • artificial tears ophthalmic solution 1 Drop  1 Drop   • benzocaine-menthol (CEPACOL) lozenge 1 Lozenge  1 Lozenge   • mag hydrox-al hydrox-simeth (MAALOX PLUS ES or MYLANTA DS) suspension 20 mL  20 mL   • ondansetron (ZOFRAN ODT) dispertab 4 mg  4 mg    Or   • ondansetron (ZOFRAN) syringe/vial injection 4 mg  4 mg   • traZODone (DESYREL) tablet 50 mg  50 mg   • sodium chloride (OCEAN) 0.65 % nasal spray 2 Spray  2 Spray       ALLERGIES:  Nkda [no known drug allergy]    PSYCHOSOCIAL HISTORY:  Living Site:   Home  Living With: Significant other  Caregiver's availability:  24-7  Number of stairs:  Not Applicable  Substance use history: Does have a pain contract through [facility], denies any other drugs, alcohol, tobacco.    LEVEL OF FUNCTION PRIOR TO DISABILTY:  Housing / Facility: [address]  Steps Into Home: 1  Steps In Home: 1  Bathroom Set up: Bathtub / Shower Combination  Equipment Owned: None  Lives with - Patient's Self Care Capacity: Significant Other  Bed Mobility: Independent  Transfer Status: Independent  Ambulation: Independent  Distance Ambulation (Feet): (community)  Assistive Devices Used: None  Stairs: Independent    LEVEL OF FUNCTION PRIOR TO ADMISSION to [facility]:  Gait Level Of Assist: Moderate Assist(progressed to Mathieu )  Assistive Device: Front Wheel Walker  Distance (Feet): 10  Deviation: Step To, Shuffled Gait, Decreased Heel Strike, Decreased Toe Off  # of Stairs Climbed: 0  Weight Bearing Status: WBAT RLE   Supine to Sit: Moderate Assist  Sit to Supine: (left up in chair )  Scooting: Minimal Assist  Comments: pt up in chair at end. ModA for trunk and BLE to reach EOB wih no bed features utilized   Sit to Stand: Minimal Assist  Bed, Chair, Wheelchair Transfer: Minimal Assist  Toilet Transfers: Refused  Transfer Method: Stand Step  Skilled Intervention: Verbal Cuing  Sitting in Chair: 10  Sitting Edge of Bed: 5 min  Standing: 10  Occupational Therapy:   Self Feeding: Independent  Grooming / Hygiene: Independent  Bathing: Independent  Dressing: Independent  Toileting: Independent  Medication Management: Independent  Laundry: Independent  Kitchen Mobility: Independent  Finances: Independent  Home Management: Independent  Shopping: Independent  Prior Level Of Mobility: Independent Without Device in Community  Driving / Transportation: Relatives / Others Provide Transportation  Prior Services: Home-Independent  Housing / Facility: [address] [facility]  Occupation (Pre-Hospital  "Vocational): Retired Due To Age  Leisure Interests: Gardening  Current Level of Function:   Upper Body Dressing: Supervision  Lower Body Dressing: Maximal Assist  Toileting: (NT-refused)    CURRENT LEVEL OF FUNCTION:   Same as level of function prior to admission to [facility]    PHYSICAL EXAM:     VITAL SIGNS:   height is 1.651 m (5' 5\") and weight is 58.7 kg (129 lb 8 oz). Her oral temperature is 36.8 °C (98.3 °F). Her blood pressure is 142/80 and her pulse is 80. Her respiration is 18 and oxygen saturation is 92%.     GENERAL: No apparent distress  HEENT: Normocephalic/atraumatic  CARDIAC: Regular rate and rhythm, normal S1, S2   LUNGS: Clear to auscultation   ABDOMINAL: soft and nontender    EXTREMITIES: no contractures, spasticity, or edema.  No calf tenderness bilaterally, 2+ bilateral DP/PT pulses.  MSK: No incision, treated nonoperatively.  NEURO:  Mental status:  A&Ox4 (person, place, date, situation) answers questions appropriately follows commands  Speech: fluent, no aphasia or dysarthria  Motor: Strength intact bilateral upper extremities.  Patient strength is limited by pain in the bilateral lower extremities but on my exam is grossly antigravity.  Sensory: intact to light touch through out  DTRs: 2+ in bilateral biceps and patellar tendons  Negative babinski b/l  Negative Araiza b/l   Tone: no spasticity noted    RADIOLOGY:  CT Pelvis 02-17-21:  1.  Right inferior and superior pubic rami fractures.  2.  Right symphyseal fracture  3.  Possible subtle right sacral alar fracture anteriorly  4.  Hematoma adjacent to the right bladder, recommend follow-up CT cystogram to exclude bladder injury.    X-ray right elbow 2/17/2021:  1.  No acute traumatic bony injury.    CT pelvis with and without contrast 2/17/2021:  1.  Right pelvic fractures as previously described.  2.  No contrast leak from the bladder to suggest bladder injury.    LABS:  Recent Labs     02/23/21  0648   SODIUM 136 "   POTASSIUM 3.7   CHLORIDE 100   CO2 26   GLUCOSE 108*   BUN 23*   CREATININE 0.62   CALCIUM 9.1     Recent Labs     02/23/21  0648   WBC 9.1   RBC 4.26   HEMOGLOBIN 12.8   HEMATOCRIT 38.9   MCV 91.3   MCH 30.0   MCHC 32.9*   RDW 45.2   PLATELETCT 320   MPV 9.8             PRIMARY REHAB DIAGNOSIS:    This patient is a 80 y.o. female admitted for acute inpatient rehabilitation with Pelvic fracture (HCC).    IMPAIRMENTS:   ADLs/IADLs  Mobility    SECONDARY DIAGNOSIS/MEDICAL CO-MORBIDITIES AFFECTING FUNCTION:  Anemia, hematoma, impaired mobility and activities of daily living, acute traumatic pain    RELEVANT CHANGES SINCE PREADMISSION EVALUATION:    Status unchanged    The patient's rehabilitation potential is Very Good  The patient's medical prognosis is good    PLAN:   I performed a complete drug regimen review and did not identify any potential clinically significant medication issues.     Discussion and Recommendations:   1. The patient requires an acute inpatient rehabilitation program with a coordinated program of care at an intensity and frequency not available at a lower level of care. This recommendation is substantiated by the patient's medical physicians who recommend that the patient's intervention and assessment of medical issues needs to be done at an acute level of care for patient's safety and maximum outcome.   2. A coordinated program of care will be supplied by an interdisciplinary team of physical therapy, occupational therapy, rehab physician, rehab nursing, and, if needed, speech therapy and rehab psychology. Rehab team presents a patient-specific rehabilitation and education program concentrating on prevention of future problems related to accessibility, mobility, skin, bowel, bladder, sexuality, and psychosocial and medical/surgical problems.   3. Need for Rehabilitation Physician: The rehab physician will be evaluating the patient on a multi-weekly basis to help coordinate the program of care.  The rehab physician communicates between medical physicians, therapists, and nurses to maximize the patient's potential outcome. Specific areas in which the rehab physician will be providing daily assessment include the following:   A. Assessing the patient's heart rate and blood pressure response (vitals monitoring) to activity and making adjustments in medications or conservative measures as needed.   B. The rehab physician will be assessing the frequency at which the program can be increased to allow the patient to reach optimal functional outcome.   C. The rehab physician will also provide assessments in daily skin care, especially in light of patient's impairments in mobility.   D. The rehab physician will provide special expertise in understanding how to work with functional impairment and recommend appropriate interventions, compensatory techniques, and education that will facilitate the patient's outcome.   4. Rehab R.N.   The rehab RN will be working with patient to carry over in room mobility and activities of daily living when the patient is not in 3 hours of skilled therapy. Rehab nursing will be working in conjunction with rehab physician to address all the medical issues above and continue to assess laboratory work and discuss abnormalities with the treating physicians, assess vitals, and response to activity, and discuss and report abnormalities with the rehab physician. Rehab RN will also continue daily skin care, supervise bladder/bowel program, instruct in medication administration, and ensure patient safety.   5. Rehab Therapy: Therapies to treat at intensity and frequency of (may change after completion of evaluation by all therapeutic disciplines):       PT:  Physical therapy to address mobility, transfer, gait training and evaluation for adaptive equipment needs 1.5 hour/day at least 5 days/week for the duration of the ELOS (see below)       OT:  Occupational therapy to address ADLs, self-care,  home management training, functional mobility/transfers and assistive device evaluation, and community re-integration 1.5  hour/day at least 5 days/week for the duration of the ELOS (see below).        ST/Dysphagia:  Speech therapy to address speech, language, and cognitive deficits as well as swallowing difficulties with retraining/dysphagia management and community re-integration with comprehension, expression, cognitive training 0 hour/day at least 5 days/week for the duration of the ELOS (see below).     Medical management / Rehabilitation Issues/ Adverse Potential as part of rehabilitation plan     REHABILITATION ISSUES/ADVERSE POTENTIAL:  1.  Deconditioning after pelvic fractures: Patient demonstrates functional deficits in strength, balance, coordination, and ADL's. Patient is admitted to [facility] for comprehensive rehabilitation therapy as described below.   Rehabilitation nursing monitors bowel and bladder control, educates on medication administration, co-morbidities and monitors patient safety.    2.  Neurostimulants: None at this time but continue to assess daily for need to initiate should status change.    3.  DVT prophylaxis:  Patient is on SCDs for anticoagulation upon transfer. Encourage OOB. Monitor daily for signs and symptoms of DVT including but not limited to swelling and pain to prevent the development of DVT that may interfere with therapies.    4.  GI prophylaxis: No indication for GI prophylaxis at this time.    5.  Pain: Controlled with Percocet.    6.  Nutrition/Dysphagia: Dietician monitors nutrient intake, recommend supplements prn and provide nutrition education to pt/family to promote optimal nutrition for wound healing/recovery.     7.  Bladder/bowel:  Start bowel and bladder program as described below, to prevent constipation, urinary retention (which may lead to UTI), and urinary incontinence (which will impact upon pt's functional independence).   - TV Q3h  while awake with post void bladder scans, I&O cath for PVRs >400  - up to commode after meal     8.  Skin/dermal ulcer prophylaxis: Monitor for new skin conditions with q.2 h. turns as required to prevent the development of skin breakdown.     9.  Cognition/Behavior:  Psychologist Dr. Chairez provides adjustment counseling to illness and psychosocial barriers that may be potential barriers to rehabilitation.     10. Respiratory therapy: RT performs O2 management prn, breathing retraining, pulmonary hygiene and bronchospasm management prn to optimize participation in therapies.     MEDICAL CO-MORBIDITIES/ADVERSE POTENTIAL AFFECTING FUNCTION:  Debility/deconditioning secondary to multiple closed fractures of the pelvis (X-rays and CT scan of the pelvis show fractures of the rami on the right side with some mild displacement. There is likely a small anterior sacral fracture is nondisplaced) nonoperative management recommended per Ortho (Dr. Telles).  -Initiate acute inpatient rehabilitation program involving physical therapy and Occupational Therapy  -Weightbearing as tolerated  -Follow-up Ortho 4 weeks (approximately mid March)    Nociceptive pain: Has pain contract with [facility].  Per her report has prescription for hydrocodone  mg 4 times a day as needed.  However patient states she does not take that much and often cuts them in half.  -Continue Percocet 5 to 10 mg as needed pain every 4 hours    Osteoarthritis: Followed by [facility]    Hypertension: Transferred on lisinopril 10 mg.  Had been requiring labetalol IV as needed while in acute.  -Increase to 15 mg for continued elevated systolic 2/23    Postmenopausal: On estradiol + Provera on transfer.  Continue.    Elevated BUN: Encourage fluids.   -Repeat BMP 2/26    Leukocytosis: Resolved on admission.    Vitamin D: High at 70.  Hold on supplementation.    Bowel: Senokot 2 tablets twice daily + as needed bowel meds.  -Last BM 2/21    Bladder:  Pelvic hematoma near bladder.  CT confirmed no bladder involvement.  Had Sandoval removed prior to transfer. Post void 31, 32 cc.   -Continue measuring PVRs.    DVT prophylaxis: Chemoprophylaxis on hold due to pelvic hematoma.  -SCDs  -Contacted surgeon regarding chemoprophylaxis recommendations 2/22.    Skin: Right elbow abrasion.  X-ray negative.    I personally performed a complete drug regimen review and no potential clinically significant medication issues were identified.     Pt was seen today for 75 min, and entire time spent in face-to-face contact was >50% in counseling and coordination of care as detailed in A/P above.        GOALS/EXPECTED LEVEL OF FUNCTION BASED ON CURRENT MEDICAL AND FUNCTIONAL STATUS (may change based on patient's medical status and rate of impairment recovery):  Transfers:   Modified Independent  Mobility/Gait:   Modified Independent  ADL's:   Modified Independent    DISPOSITION: Discharge to pre-morbid independent living setting with the supportive care of patient's signigicant other.      ELOS: 7-10 days

## 2021-02-23 NOTE — PROGRESS NOTES
Patient admitted to facility at 1545 via WC; accompanied by hospital transport.  Patient assisted to room and positioned in bed for comfort and safety; call light within reach.  Patient assisted with stowing belongings and oriented to room and facility.  Admission assessment performed and documented in computer.  Admission paperwork completed; signed copies placed in chart.  Will continue to monitor.    2 RN skin check done with admitting RN and  RN. Face photo and skin photos documented in media. Appropriate LDAs opened.   Pt with Antonio score of 18, RN wound protocol not indicated at this time, orders current.

## 2021-02-23 NOTE — FLOWSHEET NOTE
02/22/21 1621   Events/Summary/Plan   Events/Summary/Plan RT assessment   Vital Signs   Pulse 99   Respiration 18   Pulse Oximetry 95 %   $ Pulse Oximetry (Spot Check) Yes   Respiratory Assessment   Level of Consciousness Alert   Chest Exam   Work Of Breathing / Effort Within Normal Limits   Oxygen   O2 Delivery Device None - Room Air   Smoking History   Have you ever smoked Never

## 2021-02-23 NOTE — THERAPY
Physical Therapy   Initial Evaluation     Patient Name: Nafisa Hancock  Age:  80 y.o., Sex:  female  Medical Record #: [id number]  Today's Date: 2/23/2021     Subjective    Pt received sitting in WC at bedside.      Objective       02/23/21 1001   Prior Living Situation   Prior Services Home-Independent   Housing / Facility 1 Story House   Steps Into Home 1   Steps In Home 0   Bathroom Set up Bathtub / Shower Combination   Equipment Owned Front-Wheel Walker;Raised Toilet Seat Without Arms   Lives with - Patient's Self Care Capacity Spouse   Prior Level of Functional Mobility   Bed Mobility Independent   Transfer Status Independent   Ambulation Independent   Distance Ambulation (Feet)   (Community ambulation, walked around park)   Assistive Devices Used None   Stairs Independent   Prior Functioning: Everyday Activities   Self Care Independent   Indoor Mobility (Ambulation) Independent   Stairs Independent   Functional Cognition Independent   Prior Device Use None of the given options  (None)   Pain 0 - 10 Group   Location Pelvis   Location Orientation Right;Mid;Inner;Outer   Therapist Pain Assessment 5;Prior to Activity;During Activity;Post Activity   Non Verbal Descriptors   Non Verbal Scale  Calm   Cognition    Level of Consciousness Alert   ABS (Agitated Behavior Scale)   Agitated Behavior Scale Performed No   Active ROM Lower Body    Active ROM Lower Body  WDL   Strength Lower Body   Lower Body Strength  X   Rt Hip Flexion Strength 3+ (F+)   Rt Hip Abduction Strength 3+ (F+)  (Limited due to pain)   Sensation Lower Body   Lower Extremity Sensation   X   Rt Lower Extremity Proprioception Impaired   Lt Lower Extremity Proprioception Impaired   Lower Body Muscle Tone   Lower Body Muscle Tone  WDL   Bed Mobility    Supine to Sit Moderate Assist   Sit to Supine Maximal Assist   Sit to Stand Minimal Assist   Scooting Moderate Assist   Rolling Unable to Participate  (Limited both directions due to pain)    Neurological Concerns   Neurological Concerns No   Roll Left and Right   Reason if not Attempted Medical concerns   CARE Score 88   Roll Left and Right Discharge Goal   Discharge Goal 6   Sit to Lying   Assistance Needed Physical assistance   Physical Assistance Level 51%-75%   CARE Score 2   Sit to Lying Discharge Goal   Discharge Goal 6   Lying to Sitting on Side of Bed   Assistance Needed Physical assistance   Physical Assistance Level 26%-50%   CARE Score 3   Lying to Sitting on Side of Bed Discharge Goal   Discharge Goal 6   Sit to Stand   Assistance Needed Physical assistance   Physical Assistance Level 25% or less   CARE Score 3   Sit to Stand Discharge Goal   Discharge Goal 6   Chair/Bed-to-Chair Transfer   Assistance Needed Physical assistance   Physical Assistance Level 25% or less   CARE Score 3   Chair/Bed-to-Chair Transfer Discharge Goal   Discharge Goal 5   Toilet Transfer   Reason if not Attempted Safety concerns   CARE Score 88   Toilet Transfer Discharge Goal   Discharge Goal 6   Car Transfer   Reason if not Attempted Environmental limitations   CARE Score 10   Car Transfer Discharge Goal   Discharge Goal 4   Walk 10 Feet   Assistance Needed Physical assistance   Physical Assistance Level 25% or less   CARE Score 3   Walk 10 Feet Discharge Goal   Discharge Goal 6   Walk 50 Feet with Two Turns   Reason if not Attempted Safety concerns   CARE Score 88   Walk 50 Feet with Two Turns Discharge Goal   Discharge Goal 6   Walk 150 Feet   Reason if not Attempted Safety concerns   CARE Score 88   Walk 150 Feet Discharge Goal   Discharge Goal 4   Walking 10 Feet on Uneven Surfaces   Reason if not Attempted Safety concerns   CARE Score 88   Walking 10 Feet on Uneven Surfaces Discharge Goal   Discharge Goal 4   1 Step (Curb)   Reason if not Attempted Safety concerns   CARE Score 88   1 Step (Curb) Discharge Goal   Discharge Goal 4   4 Steps   Reason if not Attempted Safety concerns   CARE Score 88   4 Steps  Discharge Goal   Discharge Goal 4   12 Steps   Reason if not Attempted Safety concerns   CARE Score 88   12 Steps Discharge Goal   Discharge Goal 4   Picking Up Object   Reason if not Attempted Safety concerns   CARE Score 88   Picking Up Object Discharge Goal   Discharge Goal 6   Wheel 50 Feet with Two Turns   Reason if not Attempted Activity not applicable   CARE Score 9   Wheel 50 Feet with Two Turns Discharge Goal   Discharge Goal 9   Wheel 150 Feet   Reason if not Attempted Activity not applicable   CARE Score 9   Wheel 150 Feet Discharge Goal   Discharge Goal 9   Gait Functional Level of Assist    Gait Level Of Assist Minimal Assist   Assistive Device Front Wheel Walker   Distance (Feet) 10   # of Times Distance was Traveled 1   Deviation Antalgic;Step To;Decreased Base Of Support;Bradykinetic;Decreased Heel Strike;Decreased Toe Off   Wheelchair Functional Level of Assist   Distance Wheelchair (Feet or Distance) 0   Stairs Functional Level of Assist   Level of Assist with Stairs Unable to Participate   # of Stairs Climbed 0   Stairs Description Safety concerns   Transfer Functional Level of Assist   Bed, Chair, Wheelchair Transfer Minimal Assist   Bed Chair Wheelchair Transfer Description Adaptive equipment;Increased time;Initial preparation for task;Set-up of equipment;Supervision for safety;Verbal cueing  (Stand-pivot transfer. Verbal cuing for hand placement )   Problem List    Problems Pain;Impaired Bed Mobility;Impaired Transfers;Impaired Ambulation;Functional Strength Deficit;Impaired Balance;Decreased Activity Tolerance;Motor Planning / Sequencing   Precautions   Precautions Fall Risk;Weight Bearing As Tolerated Right Lower Extremity   Comments Antalgic; needs significantly more time to complete transfers   Current Discharge Plan   Current Discharge Plan Return to Prior Living Situation   Interdisciplinary Plan of Care Collaboration   IDT Collaboration with  Nursing   Patient Position at End of Therapy  In Bed;Call Light within Reach;Tray Table within Reach;Phone within Reach   Collaboration Comments Recent medication for high pain   Benefit   Therapy Benefit Patient Would Benefit from Inpatient Rehabilitation Physical Therapy to Maximize Functional [place] with ADLs, IADLs and Mobility.   Strengths & Barriers   Strengths Able to follow instructions;Adequate strength;Alert and oriented;Effective communication skills;Good carryover of learning;Good insight into deficits/needs;Independent prior level of function;Motivated for self care and independence;Pleasant and cooperative;Willingly participates in therapeutic activities   Barriers Decreased endurance;Fatigue;Impaired activity tolerance;Impaired balance;Limited mobility     Pt demonstrated need for increased time for transfers and bed mobility and a preference to be as independent as possible.     Assessment  80 y.o. female who presented 2/17/2021 status post fall at home.  Patient reports she fell and landed on her right hip as well as her right elbow.  Patient reports that she sustained a laceration on her right elbow, as well as experienced severe right hip pain that is worse on movement and palpation.  She denies any prior history of falls. CT pelvis with right inferior/superior pubic rami fracture, right symphyseal fracture, and possible right sacral alar fracture anteriorly. Right elbow XR unremarkable. Additional factors influencing patient status / progress include decreased ability to ambulate, and transfer independently, as well as decreased balance, proprioception in BLEs and pain in R hip/ buttock/ back area.      Plan  Recommend Physical Therapy  minutes per day 5-6 days per week for 12 days for the following treatments:  PT E Stim Attended, PT Gait Training, PT Self Care/Home Eval, PT Therapeutic Exercises, PT Neuro Re-Ed/Balance, PT Aquatic Therapy, PT Therapeutic Activity and PT Manual Therapy.    Goals:  Long term and short term goals  have been discussed with patient and they are in agreement.    Physical Therapy Problems     Problem: Mobility     Dates: Start: 02/23/21       Goal: STG-Within one week, patient will ambulate household distance     Dates: Start: 02/23/21       Description: 1) Individualized goal:  Of 50' with two turns on a flat surface CGA with LRAD to improve ambulation in home.   2) Interventions:  PT E Stim Attended, PT Gait Training, PT Self Care/Home Eval, PT Therapeutic Exercises, PT Neuro Re-Ed/Balance, PT Aquatic Therapy, PT Therapeutic Activity, and PT Manual Therapy          Goal: STG-Within one week, patient will ambulate up/down a curb     Dates: Start: 02/23/21       Description: 1) Individualized goal:  CGA with LRAD to ambulate up single step to get into home.   2) Interventions:  PT E Stim Attended, PT Gait Training, PT Self Care/Home Eval, PT Therapeutic Exercises, PT Neuro Re-Ed/Balance, PT Aquatic Therapy, PT Therapeutic Activity, and PT Manual Therapy                  Problem: Mobility Transfers     Dates: Start: 02/23/21       Goal: STG-Within one week, patient will perform bed mobility     Dates: Start: 02/23/21       Description: 1) Individualized goal:  Supervised to demonstrate improved bed mobility skills for independence at home.  2) Interventions:  PT E Stim Attended, PT Gait Training, PT Self Care/Home Eval, PT Therapeutic Exercises, PT Neuro Re-Ed/Balance, PT Aquatic Therapy, PT Therapeutic Activity, and PT Manual Therapy          Goal: STG-Within one week, patient will sit to stand     Dates: Start: 02/23/21       Description: 1) Individualized goal:  SBA from  with LRAD.   2) Interventions:  PT E Stim Attended, PT Gait Training, PT Self Care/Home Eval, PT Therapeutic Exercises, PT Neuro Re-Ed/Balance, PT Aquatic Therapy, PT Therapeutic Activity, and PT Manual Therapy                Problem: PT-Long Term Goals     Dates: Start: 02/23/21       Goal: LTG-By discharge, patient will ambulate     Dates:  Start: 02/23/21       Description: 1) Individualized goal:  150' supervised to demonstrate improved independence in home.   2) Interventions:  PT E Stim Attended, PT Gait Training, PT Self Care/Home Eval, PT Therapeutic Exercises, PT Neuro Re-Ed/Balance, PT Aquatic Therapy, PT Therapeutic Activity, and PT Manual Therapy          Goal: LTG-By discharge, patient will perform home exercise program     Dates: Start: 02/23/21       Description: 1) Individualized goal:  Independently to demonstrate independence with care at home.   2) Interventions:  PT E Stim Attended, PT Gait Training, PT Self Care/Home Eval, PT Therapeutic Exercises, PT Neuro Re-Ed/Balance, PT Aquatic Therapy, PT Therapeutic Activity, and PT Manual Therapy          Goal: LTG-By discharge, patient will ambulate up/down 4-6 stairs     Dates: Start: 02/23/21       Description: 1) Individualized goal:  Supervised with 1HR to demonstrate improved independence with community ambulation.   2) Interventions:  PT E Stim Attended, PT Gait Training, PT Self Care/Home Eval, PT Therapeutic Exercises, PT Neuro Re-Ed/Balance, PT Aquatic Therapy, PT Therapeutic Activity, and PT Manual Therapy          Goal: LTG-By discharge, patient will transfer in/out of a car     Dates: Start: 02/23/21       Description: 1) Individualized goal:  SBA and minimal verbal cuing for transfer home.   2) Interventions:  PT E Stim Attended, PT Gait Training, PT Self Care/Home Eval, PT Therapeutic Exercises, PT Neuro Re-Ed/Balance, PT Aquatic Therapy, PT Therapeutic Activity, and PT Manual Therapy

## 2021-02-24 PROCEDURE — 99232 SBSQ HOSP IP/OBS MODERATE 35: CPT | Performed by: PHYSICAL MEDICINE & REHABILITATION

## 2021-02-24 PROCEDURE — 770010 HCHG ROOM/CARE - REHAB SEMI PRIVAT*

## 2021-02-24 PROCEDURE — 700102 HCHG RX REV CODE 250 W/ 637 OVERRIDE(OP): Performed by: PHYSICAL MEDICINE & REHABILITATION

## 2021-02-24 PROCEDURE — A9270 NON-COVERED ITEM OR SERVICE: HCPCS | Performed by: PHYSICAL MEDICINE & REHABILITATION

## 2021-02-24 PROCEDURE — 97530 THERAPEUTIC ACTIVITIES: CPT | Mod: CQ

## 2021-02-24 PROCEDURE — 97535 SELF CARE MNGMENT TRAINING: CPT

## 2021-02-24 PROCEDURE — 97116 GAIT TRAINING THERAPY: CPT | Mod: CQ

## 2021-02-24 PROCEDURE — 97530 THERAPEUTIC ACTIVITIES: CPT

## 2021-02-24 RX ORDER — HYDROCODONE BITARTRATE AND ACETAMINOPHEN 5; 325 MG/1; MG/1
2 TABLET ORAL EVERY 4 HOURS PRN
Status: DISCONTINUED | OUTPATIENT
Start: 2021-02-24 | End: 2021-03-04 | Stop reason: HOSPADM

## 2021-02-24 RX ORDER — HYDROCODONE BITARTRATE AND ACETAMINOPHEN 5; 325 MG/1; MG/1
1 TABLET ORAL EVERY 6 HOURS PRN
Status: DISCONTINUED | OUTPATIENT
Start: 2021-02-24 | End: 2021-02-24

## 2021-02-24 RX ORDER — HYDROCODONE BITARTRATE AND ACETAMINOPHEN 5; 325 MG/1; MG/1
1 TABLET ORAL EVERY 4 HOURS PRN
Status: DISCONTINUED | OUTPATIENT
Start: 2021-02-24 | End: 2021-03-04 | Stop reason: HOSPADM

## 2021-02-24 RX ORDER — HYDROCODONE BITARTRATE AND ACETAMINOPHEN 7.5; 325 MG/1; MG/1
1 TABLET ORAL EVERY 4 HOURS PRN
Status: DISCONTINUED | OUTPATIENT
Start: 2021-02-24 | End: 2021-02-24

## 2021-02-24 RX ORDER — HYDROCODONE BITARTRATE AND ACETAMINOPHEN 5; 325 MG/1; MG/1
2 TABLET ORAL EVERY 6 HOURS PRN
Status: DISCONTINUED | OUTPATIENT
Start: 2021-02-24 | End: 2021-02-24

## 2021-02-24 RX ORDER — HYDROCODONE BITARTRATE AND ACETAMINOPHEN 7.5; 325 MG/1; MG/1
2 TABLET ORAL EVERY 4 HOURS PRN
Status: DISCONTINUED | OUTPATIENT
Start: 2021-02-24 | End: 2021-02-24

## 2021-02-24 RX ADMIN — TRAZODONE HYDROCHLORIDE 50 MG: 50 TABLET ORAL at 20:05

## 2021-02-24 RX ADMIN — MEDROXYPROGESTERONE ACETATE 2.5 MG: 2.5 TABLET ORAL at 08:33

## 2021-02-24 RX ADMIN — OXYCODONE HYDROCHLORIDE AND ACETAMINOPHEN 2 TABLET: 5; 325 TABLET ORAL at 02:47

## 2021-02-24 RX ADMIN — HYDRALAZINE HYDROCHLORIDE 25 MG: 25 TABLET, FILM COATED ORAL at 20:09

## 2021-02-24 RX ADMIN — OXYCODONE HYDROCHLORIDE AND ACETAMINOPHEN 2 TABLET: 5; 325 TABLET ORAL at 11:46

## 2021-02-24 RX ADMIN — HYDROCODONE BITARTRATE AND ACETAMINOPHEN 2 TABLET: 5; 325 TABLET ORAL at 20:34

## 2021-02-24 RX ADMIN — HYDROCODONE BITARTRATE AND ACETAMINOPHEN 2 TABLET: 5; 325 TABLET ORAL at 16:25

## 2021-02-24 RX ADMIN — OXYCODONE HYDROCHLORIDE AND ACETAMINOPHEN 2 TABLET: 5; 325 TABLET ORAL at 07:32

## 2021-02-24 RX ADMIN — ESTRADIOL 0.5 MG: 1 TABLET ORAL at 08:39

## 2021-02-24 RX ADMIN — LISINOPRIL 15 MG: 5 TABLET ORAL at 08:33

## 2021-02-24 ASSESSMENT — ACTIVITIES OF DAILY LIVING (ADL)
BED_CHAIR_WHEELCHAIR_TRANSFER_DESCRIPTION: ADAPTIVE EQUIPMENT;INCREASED TIME;SUPERVISION FOR SAFETY;SET-UP OF EQUIPMENT;VERBAL CUEING
TOILETING_LEVEL_OF_ASSIST_DESCRIPTION: GRAB BAR;INCREASED TIME;SET-UP OF EQUIPMENT;SUPERVISION FOR SAFETY;VERBAL CUEING
BED_CHAIR_WHEELCHAIR_TRANSFER_DESCRIPTION: SET-UP OF EQUIPMENT;SUPERVISION FOR SAFETY;VERBAL CUEING
TOILET_TRANSFER_DESCRIPTION: GRAB BAR;SET-UP OF EQUIPMENT;SUPERVISION FOR SAFETY
TOILETING_LEVEL_OF_ASSIST_DESCRIPTION: GRAB BAR;INCREASED TIME;SET-UP OF EQUIPMENT;SUPERVISION FOR SAFETY;VERBAL CUEING

## 2021-02-24 ASSESSMENT — PAIN DESCRIPTION - PAIN TYPE
TYPE: ACUTE PAIN

## 2021-02-24 ASSESSMENT — GAIT ASSESSMENTS
ASSISTIVE DEVICE: FRONT WHEEL WALKER
GAIT LEVEL OF ASSIST: CONTACT GUARD ASSIST
DEVIATION: ANTALGIC;DECREASED BASE OF SUPPORT;DECREASED HEEL STRIKE;DECREASED TOE OFF;BRADYKINETIC
ASSISTIVE DEVICE: FRONT WHEEL WALKER
DISTANCE (FEET): 12
DEVIATION: ANTALGIC;DECREASED BASE OF SUPPORT;DECREASED HEEL STRIKE;DECREASED TOE OFF
GAIT LEVEL OF ASSIST: CONTACT GUARD ASSIST
DISTANCE (FEET): 20

## 2021-02-24 NOTE — DISCHARGE PLANNING
CASE MANAGEMENT INITIAL ASSESSMENT    Admit Date:  2/22/2021     I attempted to speak to patient and her significant other to discuss role of case management / discharge planning / team conference. I LM on patients mobile phone number and home number was busy.  Will continue to reach out to patient/significant other to complete CM assessment/eval.      Patient is a  80 y.o. female transferred from [facility] S/P pelvic fracture after a fall.      PLOF: Active, home and independent with significant other.      PCP: Rene Chang MD  Ortho Consult: Dr. Telles    ADM MD: Nohemi Cosby    Diagnosis: History of pelvic fracture [Z87.81]    Co-morbidities:   Patient Active Problem List    Diagnosis Date Noted   • Leukocytosis 02/18/2021   • Pelvic fracture (HCC) 02/18/2021   • Pelvic hematoma in female 02/18/2021   • Postmenopausal 02/05/2021   • Opiate dependence (HCC) 08/21/2019   • Dyslipidemia 11/15/2018   • HTN (hypertension) 02/23/2017   • Right shoulder pain 02/16/2017   • Arthritis 05/24/2011   • Osteoporosis, senile 06/18/2010     Prior Living Situation:  Housing / Facility: 1 Story House  Lives with - Patient's Self Care Capacity: Spouse    Prior Level of Function:  Medication Management: Independent  Finances: Independent  Home Management: Independent  Shopping: Independent  Prior Level Of Mobility: Independent Without Device in Community  Driving / Transportation: Relatives / Others Provide Transportation    Support Systems:  Primary    Other support systems: Ryley Segura Significant other: [phone]     Previous Services Utilized:   Equipment Owned: Front-Wheel Walker, Raised Toilet Seat Without Arms  Prior Services: Home-Independent    Other Information:  Occupation (Pre-Hospital Vocational): Retired Due To Age(Retired  inside [employer])          Patient / Family Goal:   TBD    Strengths-TBD  Barriers-TBD        Plan:  1. Continue to follow patient through hospitalization and provide  discharge planning in collaboration with patient, family, physicians and ancillary services.     2. Utilize community resources to ensure a safe discharge.

## 2021-02-24 NOTE — THERAPY
Physical Therapy   Daily Treatment     Patient Name: Nafisa Hancock  Age:  80 y.o., Sex:  female  Medical Record #: [id number]  Today's Date: 2/24/2021     Precautions  Precautions: Fall Risk, Weight Bearing As Tolerated Right Lower Extremity, Weight Bearing As Tolerated Right Upper Extremity  Comments: Antalgic; needs significantly more time to complete transfers    Subjective    Pt received in BR, finishing her OT session. She was agreeable to PT     Objective       02/24/21 7824   Precautions   Precautions Fall Risk;Weight Bearing As Tolerated Right Lower Extremity;Weight Bearing As Tolerated Right Upper Extremity   Comments Antalgic; needs significantly more time to complete transfers   Pain   Intervention Emotional Support;Repositioned   Pain 0 - 10 Group   Location Pelvis   Location Orientation Posterior   Description Aching;Constant   Comfort Goal Perform Activity;Comfort with Movement;5;4   Therapist Pain Assessment Post Activity Pain Same as Prior to Activity;7;Nurse Notified   Gait Functional Level of Assist    Gait Level Of Assist Contact Guard Assist   Assistive Device Front Wheel Walker   Distance (Feet) 12  (with 2 turns)   # of Times Distance was Traveled 1   Deviation Antalgic;Decreased Base Of Support;Decreased Heel Strike;Decreased Toe Off;Bradykinetic   Bed Mobility    Sit to Stand Minimal Assist   Interdisciplinary Plan of Care Collaboration   IDT Collaboration with  Occupational Therapist   Patient Position at End of Therapy Seated;Self Releasing Lap Belt Applied;Chair Alarm On;Call Light within Reach;Tray Table within Reach   Collaboration Comments recieved patient from OT   PT Total Time Spent   PT Individual Total Time Spent (Mins) 30   PT Charge Group   PT Gait Training 1   PT Therapeutic Activities 1   Supervising Physical Therapist Inderjit yoo     Continued education on patients rehab goals, daily schedule and POC    Pt required Mathieu to complete LB dressing in  seated    Assessment    Pt participated in LB dressing, STS and gait training as indicated above. primarily limited by pain resulting in decreased mobility.   Strengths: Able to follow instructions, Adequate strength, Alert and oriented, Effective communication skills, Good carryover of learning, Good insight into deficits/needs, Independent prior level of function, Motivated for self care and independence, Pleasant and cooperative, Willingly participates in therapeutic activities  Barriers: Decreased endurance, Fatigue, Impaired activity tolerance, Impaired balance, Limited mobility    Plan    Decrease A with bed mobility; FWW safety and usage with transfers and ambulation; ther ex for AROM and strengthening; education for pain management and POC       Physical Therapy Problems     Problem: Mobility     Dates: Start: 02/23/21       Goal: STG-Within one week, patient will ambulate household distance     Dates: Start: 02/23/21       Description: 1) Individualized goal:  Of 50' with two turns on a flat surface CGA with LRAD to improve ambulation in home.   2) Interventions:  PT E Stim Attended, PT Gait Training, PT Self Care/Home Eval, PT Therapeutic Exercises, PT Neuro Re-Ed/Balance, PT Aquatic Therapy, PT Therapeutic Activity, and PT Manual Therapy          Goal: STG-Within one week, patient will ambulate up/down a curb     Dates: Start: 02/23/21       Description: 1) Individualized goal:  CGA with LRAD to ambulate up single step to get into home.   2) Interventions:  PT E Stim Attended, PT Gait Training, PT Self Care/Home Eval, PT Therapeutic Exercises, PT Neuro Re-Ed/Balance, PT Aquatic Therapy, PT Therapeutic Activity, and PT Manual Therapy                  Problem: Mobility Transfers     Dates: Start: 02/23/21       Goal: STG-Within one week, patient will perform bed mobility     Dates: Start: 02/23/21       Description: 1) Individualized goal:  Supervised to demonstrate improved bed mobility skills for  independence at home.  2) Interventions:  PT E Stim Attended, PT Gait Training, PT Self Care/Home Eval, PT Therapeutic Exercises, PT Neuro Re-Ed/Balance, PT Aquatic Therapy, PT Therapeutic Activity, and PT Manual Therapy          Goal: STG-Within one week, patient will sit to stand     Dates: Start: 02/23/21       Description: 1) Individualized goal:  SBA from WC with LRAD.   2) Interventions:  PT E Stim Attended, PT Gait Training, PT Self Care/Home Eval, PT Therapeutic Exercises, PT Neuro Re-Ed/Balance, PT Aquatic Therapy, PT Therapeutic Activity, and PT Manual Therapy                Problem: PT-Long Term Goals     Dates: Start: 02/23/21       Goal: LTG-By discharge, patient will ambulate     Dates: Start: 02/23/21       Description: 1) Individualized goal:  150' supervised to demonstrate improved independence in home.   2) Interventions:  PT E Stim Attended, PT Gait Training, PT Self Care/Home Eval, PT Therapeutic Exercises, PT Neuro Re-Ed/Balance, PT Aquatic Therapy, PT Therapeutic Activity, and PT Manual Therapy          Goal: LTG-By discharge, patient will perform home exercise program     Dates: Start: 02/23/21       Description: 1) Individualized goal:  Independently to demonstrate independence with care at home.   2) Interventions:  PT E Stim Attended, PT Gait Training, PT Self Care/Home Eval, PT Therapeutic Exercises, PT Neuro Re-Ed/Balance, PT Aquatic Therapy, PT Therapeutic Activity, and PT Manual Therapy          Goal: LTG-By discharge, patient will ambulate up/down 4-6 stairs     Dates: Start: 02/23/21       Description: 1) Individualized goal:  Supervised with 1HR to demonstrate improved independence with community ambulation.   2) Interventions:  PT E Stim Attended, PT Gait Training, PT Self Care/Home Eval, PT Therapeutic Exercises, PT Neuro Re-Ed/Balance, PT Aquatic Therapy, PT Therapeutic Activity, and PT Manual Therapy          Goal: LTG-By discharge, patient will transfer in/out of a car      Dates: Start: 02/23/21       Description: 1) Individualized goal:  SBA and minimal verbal cuing for transfer home.   2) Interventions:  PT E Stim Attended, PT Gait Training, PT Self Care/Home Eval, PT Therapeutic Exercises, PT Neuro Re-Ed/Balance, PT Aquatic Therapy, PT Therapeutic Activity, and PT Manual Therapy

## 2021-02-24 NOTE — THERAPY
Physical Therapy   Daily Treatment     Patient Name: Nafisa Hancock  Age:  80 y.o., Sex:  female  Medical Record #: [id number]  Today's Date: 2/23/2021     Precautions  Precautions: Fall Risk, Weight Bearing As Tolerated Right Upper Extremity  Comments: Antalgic; needs significantly more time to complete transfers    Subjective    PT in patient conferences at 1430. Patient agreeable to makeup PT at 1500 instead. Patient received supine in bed.     Objective       02/23/21 1502   Gait Functional Level of Assist    Gait Level Of Assist Minimal Assist   Assistive Device Front Wheel Walker   Distance (Feet) 12   # of Times Distance was Traveled 1   Deviation Antalgic;Step To;Decreased Base Of Support;Bradykinetic;Decreased Heel Strike;Decreased Toe Off  (verbal cues for positioning & use of FWW; very bradykinetic)   Wheelchair Functional Level of Assist   Wheelchair Assist Stand by Assist   Distance Wheelchair (Feet or Distance)   (limited to about 5 feet)   Wheelchair Description Extra time;Leg rest management;Impaired coordination;Supervision for safety;Verbal cueing   Stairs Functional Level of Assist   Level of Assist with Stairs Unable to Participate   # of Stairs Climbed 0   Stairs Description Safety concerns   Transfer Functional Level of Assist   Bed, Chair, Wheelchair Transfer Minimal Assist   Bed Chair Wheelchair Transfer Description Adaptive equipment;Increased time;Set-up of equipment;Supervision for safety;Verbal cueing  (FWW, gait belt)   Bed Mobility    Supine to Sit Moderate Assist   Sit to Supine Maximal Assist   Sit to Stand Minimal Assist   Scooting Moderate Assist   Rolling Unable to Participate   Interdisciplinary Plan of Care Collaboration   IDT Collaboration with  Other (See Comments);Certified Nursing Assistant  (SPT)   Patient Position at End of Therapy In Bed;Call Light within Reach;Tray Table within Reach;Phone within Reach   Collaboration Comments treatment goals, patient positioning   PT  Total Time Spent   PT Individual Total Time Spent (Mins) 30   PT Charge Group   PT Gait Training 1   PT Therapeutic Activities 1       Assessment    Patient has impaired strength, continues to be antalgic, requires BLE assistance with sit to supine, and requires A with scooting; significant bradykinesia with most mobility and portions of transfer; good motivation though; pleasant.    Strengths: Able to follow instructions, Adequate strength, Alert and oriented, Effective communication skills, Good carryover of learning, Good insight into deficits/needs, Independent prior level of function, Motivated for self care and independence, Pleasant and cooperative, Willingly participates in therapeutic activities  Barriers: Decreased endurance, Fatigue, Impaired activity tolerance, Impaired balance, Limited mobility    Plan    Decrease A with bed mobility; FWW safety and usage with transfers and ambulation; ther ex for AROM and strengthening; education for pain management and POC      Physical Therapy Problems     Problem: Mobility     Dates: Start: 02/23/21       Goal: STG-Within one week, patient will ambulate household distance     Dates: Start: 02/23/21       Description: 1) Individualized goal:  Of 50' with two turns on a flat surface CGA with LRAD to improve ambulation in home.   2) Interventions:  PT E Stim Attended, PT Gait Training, PT Self Care/Home Eval, PT Therapeutic Exercises, PT Neuro Re-Ed/Balance, PT Aquatic Therapy, PT Therapeutic Activity, and PT Manual Therapy          Goal: STG-Within one week, patient will ambulate up/down a curb     Dates: Start: 02/23/21       Description: 1) Individualized goal:  CGA with LRAD to ambulate up single step to get into home.   2) Interventions:  PT E Stim Attended, PT Gait Training, PT Self Care/Home Eval, PT Therapeutic Exercises, PT Neuro Re-Ed/Balance, PT Aquatic Therapy, PT Therapeutic Activity, and PT Manual Therapy                  Problem: Mobility Transfers      Dates: Start: 02/23/21       Goal: STG-Within one week, patient will perform bed mobility     Dates: Start: 02/23/21       Description: 1) Individualized goal:  Supervised to demonstrate improved bed mobility skills for independence at home.  2) Interventions:  PT E Stim Attended, PT Gait Training, PT Self Care/Home Eval, PT Therapeutic Exercises, PT Neuro Re-Ed/Balance, PT Aquatic Therapy, PT Therapeutic Activity, and PT Manual Therapy          Goal: STG-Within one week, patient will sit to stand     Dates: Start: 02/23/21       Description: 1) Individualized goal:  SBA from WC with LRAD.   2) Interventions:  PT E Stim Attended, PT Gait Training, PT Self Care/Home Eval, PT Therapeutic Exercises, PT Neuro Re-Ed/Balance, PT Aquatic Therapy, PT Therapeutic Activity, and PT Manual Therapy                Problem: PT-Long Term Goals     Dates: Start: 02/23/21       Goal: LTG-By discharge, patient will ambulate     Dates: Start: 02/23/21       Description: 1) Individualized goal:  150' supervised to demonstrate improved independence in home.   2) Interventions:  PT E Stim Attended, PT Gait Training, PT Self Care/Home Eval, PT Therapeutic Exercises, PT Neuro Re-Ed/Balance, PT Aquatic Therapy, PT Therapeutic Activity, and PT Manual Therapy          Goal: LTG-By discharge, patient will perform home exercise program     Dates: Start: 02/23/21       Description: 1) Individualized goal:  Independently to demonstrate independence with care at home.   2) Interventions:  PT E Stim Attended, PT Gait Training, PT Self Care/Home Eval, PT Therapeutic Exercises, PT Neuro Re-Ed/Balance, PT Aquatic Therapy, PT Therapeutic Activity, and PT Manual Therapy          Goal: LTG-By discharge, patient will ambulate up/down 4-6 stairs     Dates: Start: 02/23/21       Description: 1) Individualized goal:  Supervised with 1HR to demonstrate improved independence with community ambulation.   2) Interventions:  PT E Stim Attended, PT Gait Training,  PT Self Care/Home Eval, PT Therapeutic Exercises, PT Neuro Re-Ed/Balance, PT Aquatic Therapy, PT Therapeutic Activity, and PT Manual Therapy          Goal: LTG-By discharge, patient will transfer in/out of a car     Dates: Start: 02/23/21       Description: 1) Individualized goal:  SBA and minimal verbal cuing for transfer home.   2) Interventions:  PT E Stim Attended, PT Gait Training, PT Self Care/Home Eval, PT Therapeutic Exercises, PT Neuro Re-Ed/Balance, PT Aquatic Therapy, PT Therapeutic Activity, and PT Manual Therapy

## 2021-02-24 NOTE — PROGRESS NOTES
"Rehab Progress Note     Encounter Date: 2/24/2021    CC: Pelvic pain.    Interval Events (Subjective)  Intermittently elevated blood pressures in the 150's.  Last BM 2/23.  Does have continent/incontinent of bladder into her adult briefs.  Patient seen and examined in her room.  She states she is overall doing well.  Communicated with nursing and nursing states that she is moving better today compared to yesterday.  Patient reports that she is on hydrocodone at home, not Percocet and this pain medication does not seem to be easing her pain the way that the hydrocodone does.  Discussed with patient that we can try switching for efficacy evaluation.  Patient does not report any issues with bladder/bowel.  Denies any fevers, chills, shortness of breath, chest pain, nausea, vomiting.    14 point ROS reviewed and negative except as stated above.     Objective:  VITAL SIGNS: /75   Pulse 78   Temp 36.8 °C (98.2 °F) (Oral)   Resp 18   Ht 1.651 m (5' 5\")   Wt 58.7 kg (129 lb 8 oz)   LMP 01/13/2017 (Exact Date) Comment: Due to hormone therapy to prevent cancer  SpO2 92%   BMI 21.55 kg/m²     GEN: No apparent distress, sitting comfortably in her bed watching TV.  HEENT: Head normocephalic, atraumatic.  Sclera nonicteric bilaterally, no ocular discharge appreciated bilaterally.  CV: Extremities warm and well-perfused, no peripheral edema appreciated bilaterally.  PULMONARY: Breathing nonlabored on room air, no respiratory accessory muscle use.  Not requiring supplemental oxygen.  ABD: Soft, nontender.  SKIN: No appreciable skin breakdown on exposed areas of skin.  NEURO: Awake alert.  Conversational.  Logical thought content.  Moving all extremities volitionally.  PSYCH: Mood and affect within normal limits.      Recent Results (from the past 72 hour(s))   SARS-CoV-2 PCR (24 hour In-House): Collect NP swab in [facility]    Collection Time: 02/22/21  6:45 PM    Specimen: Nasopharyngeal; Respirate   Result Value Ref Range "    SARS-CoV-2 Source NP Swab     SARS-CoV-2 by PCR NotDetected    CBC with Differential    Collection Time: 02/23/21  6:48 AM   Result Value Ref Range    WBC 9.1 4.8 - 10.8 K/uL    RBC 4.26 4.20 - 5.40 M/uL    Hemoglobin 12.8 12.0 - 16.0 g/dL    Hematocrit 38.9 37.0 - 47.0 %    MCV 91.3 81.4 - 97.8 fL    MCH 30.0 27.0 - 33.0 pg    MCHC 32.9 (L) 33.6 - 35.0 g/dL    RDW 45.2 35.9 - 50.0 fL    Platelet Count 320 164 - 446 K/uL    MPV 9.8 9.0 - 12.9 fL    Neutrophils-Polys 67.70 44.00 - 72.00 %    Lymphocytes 20.90 (L) 22.00 - 41.00 %    Monocytes 7.80 0.00 - 13.40 %    Eosinophils 2.50 0.00 - 6.90 %    Basophils 0.70 0.00 - 1.80 %    Immature Granulocytes 0.40 0.00 - 0.90 %    Nucleated RBC 0.00 /100 WBC    Neutrophils (Absolute) 6.19 2.00 - 7.15 K/uL    Lymphs (Absolute) 1.91 1.00 - 4.80 K/uL    Monos (Absolute) 0.71 0.00 - 0.85 K/uL    Eos (Absolute) 0.23 0.00 - 0.51 K/uL    Baso (Absolute) 0.06 0.00 - 0.12 K/uL    Immature Granulocytes (abs) 0.04 0.00 - 0.11 K/uL    NRBC (Absolute) 0.00 K/uL   Comp Metabolic Panel (CMP)    Collection Time: 02/23/21  6:48 AM   Result Value Ref Range    Sodium 136 135 - 145 mmol/L    Potassium 3.7 3.6 - 5.5 mmol/L    Chloride 100 96 - 112 mmol/L    Co2 26 20 - 33 mmol/L    Anion Gap 10.0 7.0 - 16.0    Glucose 108 (H) 65 - 99 mg/dL    Bun 23 (H) 8 - 22 mg/dL    Creatinine 0.62 0.50 - 1.40 mg/dL    Calcium 9.1 8.5 - 10.5 mg/dL    AST(SGOT) 24 12 - 45 U/L    ALT(SGPT) 19 2 - 50 U/L    Alkaline Phosphatase 63 30 - 99 U/L    Total Bilirubin 0.6 0.1 - 1.5 mg/dL    Albumin 3.9 3.2 - 4.9 g/dL    Total Protein 6.9 6.0 - 8.2 g/dL    Globulin 3.0 1.9 - 3.5 g/dL    A-G Ratio 1.3 g/dL   HEMOGLOBIN A1C    Collection Time: 02/23/21  6:48 AM   Result Value Ref Range    Glycohemoglobin 5.7 (H) 0.0 - 5.6 %    Est Avg Glucose 117 mg/dL   Magnesium    Collection Time: 02/23/21  6:48 AM   Result Value Ref Range    Magnesium 2.3 1.5 - 2.5 mg/dL   Phosphorus    Collection Time: 02/23/21  6:48 AM   Result  Value Ref Range    Phosphorus 3.2 2.5 - 4.5 mg/dL   TSH with Reflex to FT4    Collection Time: 02/23/21  6:48 AM   Result Value Ref Range    TSH 1.610 0.380 - 5.330 uIU/mL   Vitamin D, 25-hydroxy (blood)    Collection Time: 02/23/21  6:48 AM   Result Value Ref Range    25-Hydroxy   Vitamin D 25 70 30 - 100 ng/mL   ESTIMATED GFR    Collection Time: 02/23/21  6:48 AM   Result Value Ref Range    GFR If African American >60 >60 mL/min/1.73 m 2    GFR If Non African American >60 >60 mL/min/1.73 m 2       Current Facility-Administered Medications   Medication Frequency   • lisinopril (PRINIVIL) tablet 15 mg DAILY   • senna-docusate (PERICOLACE or SENOKOT S) 8.6-50 MG per tablet 2 tablet BID    And   • polyethylene glycol/lytes (MIRALAX) PACKET 1 Packet QDAY PRN    And   • magnesium hydroxide (MILK OF MAGNESIA) suspension 30 mL QDAY PRN    And   • bisacodyl (DULCOLAX) suppository 10 mg QDAY PRN   • acetaminophen (Tylenol) tablet 650 mg Q6HRS PRN   • estradiol (ESTRACE) tablet 0.5 mg DAILY   • medroxyPROGESTERone (PROVERA) tablet 2.5 mg DAILY   • oxyCODONE-acetaminophen (PERCOCET) 5-325 MG per tablet 1 tablet Q4HRS PRN   • oxyCODONE-acetaminophen (PERCOCET) 5-325 MG per tablet 2 tablet Q4HRS PRN   • Respiratory Therapy Consult Continuous RT   • Pharmacy Consult Request ...Pain Management Review 1 Each PHARMACY TO DOSE   • hydrALAZINE (APRESOLINE) tablet 25 mg Q8HRS PRN   • artificial tears ophthalmic solution 1 Drop PRN   • benzocaine-menthol (CEPACOL) lozenge 1 Lozenge Q2HRS PRN   • mag hydrox-al hydrox-simeth (MAALOX PLUS ES or MYLANTA DS) suspension 20 mL Q2HRS PRN   • ondansetron (ZOFRAN ODT) dispertab 4 mg 4X/DAY PRN    Or   • ondansetron (ZOFRAN) syringe/vial injection 4 mg 4X/DAY PRN   • traZODone (DESYREL) tablet 50 mg QHS PRN   • sodium chloride (OCEAN) 0.65 % nasal spray 2 Spray PRN       Orders Placed This Encounter   Procedures   • Diet Order Diet: Regular     Standing Status:   Standing     Number of  Occurrences:   1     Order Specific Question:   Diet:     Answer:   Regular [1]       Assessment:  Active Hospital Problems    Diagnosis    • *Pelvic fracture (HCC)    • Leukocytosis    • Pelvic hematoma in female    • Postmenopausal    • Opiate dependence (HCC)    • HTN (hypertension)    • Osteoporosis, senile        Medical Decision Making and Plan:  Debility/deconditioning secondary to multiple closed fractures of the pelvis after fall 2/17/2021 (X-rays and CT scan of the pelvis show fractures of the rami on the right side with some mild displacement. There is likely a small anterior sacral fracture is nondisplaced) nonoperative management recommended per Ortho (Dr. Telles).  -Initiate acute inpatient rehabilitation program involving physical therapy and Occupational Therapy  -Weightbearing as tolerated  -Follow-up Ortho 4 weeks (approximately mid March)     Nociceptive pain: Has pain contract with [facility].  Per her report has prescription for hydrocodone  mg 4 times a day as needed.  However patient states she does not take that much and often cuts them in half.  -Continue Percocet 5 to 10 mg as needed pain every 4 hours--> changed to hydrocodone per patient request given that she is normally on hydrocodone through her pain management group.  -Hydrocodone 5-325 mg for moderate pain, hydrocodone  mg for severe pain.  -Follow-up pain 2/25.     Osteoarthritis: Followed by [facility] pain     Hypertension: Transferred on lisinopril 10 mg.  Had been requiring labetalol IV as needed while in acute.  -Increase to 15 mg for continued elevated systolic 2/23  -2/24 continues to be elevated into 150s intermittently.  Will monitor one more day and increase amlodipine if needed since just increased 2/23.      Postmenopausal: On estradiol + Provera on transfer.  Continue.     Elevated BUN: Encourage fluids.   -Repeat BMP 2/26     Leukocytosis: Resolved on admission.     Vitamin D: High at 70.  Hold on  supplementation.     Bowel: Senokot 2 tablets twice daily + as needed bowel meds.  -Last BM 2/23     Bladder: Pelvic hematoma near bladder.  CT confirmed no bladder involvement.  Had Sandoval removed prior to transfer. Post void 31, 32, 28 cc.   -PVRs low okay to discontinue.     DVT prophylaxis: Chemoprophylaxis on hold due to pelvic hematoma.  -SCDs  -Contacted surgeon regarding chemoprophylaxis recommendations 2/22.     Skin: Right elbow abrasion.  X-ray negative.       Total time: 27  minutes.  I spent greater than 50% of the time for patient care and coordination on this date, including unit/floor time, and face-to-face time with the patient as per assessment and plan above.    Nohemi Cosby D.O.

## 2021-02-24 NOTE — DISCHARGE PLANNING
Was able to contact patient by phone and discuss role of Case management/discharge planning/ team conference. Patient lives with her significant other in a single level home with level entry. Transport will be provided by SO.     Goals: Patient wants to walk as soon as possible.     Additional Case Management Questions:  Have you ever received case management services for yourself or a family member? no  Do you feel you have and an understanding of what services  provide? yes    Do you have any additional questions regarding case management?  no        CASE MANAGEMENT PLAN OF CARE   Individualized Goals:   1. To walk again  2. To be able to manage at home with SO's support.  3. To improve mental clarity    Barriers:   1. Functional impairment.     Informed patient I will follow up with patient after Team Conference on 2/25/21.

## 2021-02-24 NOTE — WOUND TEAM
Wound consult received regarding patient right elbow skin tear. This wound RN in to see patient, patient agreeable, removed current dressing in place. Patient right elbow skin tear with staples in place, clean, dry , intact. No open wound present, so no need for wound care at this time. Nursing to follow skin care per protocol. No need for wound team follow up at this time. Please call for questions or concerns.

## 2021-02-24 NOTE — THERAPY
"Occupational Therapy  Daily Treatment     Patient Name: Nafisa Hancock  Age:  80 y.o., Sex:  female  Medical Record #: [id number]  Today's Date: 2/24/2021     Precautions  Precautions: Fall Risk, Weight Bearing As Tolerated Right Lower Extremity, Weight Bearing As Tolerated Right Upper Extremity  Comments: Antalgic; needs significantly more time to complete transfers         Subjective    \"I feel like I need to go pee, but I'm wearing this diaper so I guess I don't have to go to the bathroom.\" OTR educated pt on the importance of still using call light and using toilet to urinate. Education on negative affects of urinating on self in brief and how all patients should be using the toilet as long as they are able. Pt perceptive to education.     Objective       02/24/21 0831   Cognition    Level of Consciousness Alert   Sleep/Wake Cycle   Sleep & Rest Awake   Functional Level of Assist   Eating Modified Independent   Eating Description Increased time   Lower Body Dressing Moderate Assist   Lower Body Dressing Description Grab bar;Increased time;Set-up of equipment;Supervision for safety;Verbal cueing   Toileting Stand by Assist   Toileting Description Grab bar;Increased time;Set-up of equipment;Supervision for safety;Verbal cueing   Bed, Chair, Wheelchair Transfer Minimal Assist   Bed Chair Wheelchair Transfer Description Set-up of equipment;Supervision for safety;Verbal cueing   Toilet Transfers Stand by Assist   Toilet Transfer Description Increased time;Set-up of equipment;Supervision for safety;Verbal cueing;Grab bar;Adaptive equipment   Bed Mobility    Sit to Supine Moderate Assist   Scooting Moderate Assist   Interdisciplinary Plan of Care Collaboration   IDT Collaboration with  Physical Therapist   Patient Position at End of Therapy Other (Comments)  (with PT)   Collaboration Comments hand off of care   OT Total Time Spent   OT Individual Total Time Spent (Mins) 30   OT Charge Group   OT Self Care / ADL 2 "       Assessment    Pt continues to be most limited by pain/antalgia. Pt improved with mobility in the restroom, but continues to struggle with bed mobility and STS from the bed. Pt perceptive to education on importance of not using the adult brief to urinate in as it is only being worn for accidental incontinence.     Strengths: Alert and oriented, Independent prior level of function, Motivated for self care and independence, Pleasant and cooperative, Supportive family, Willingly participates in therapeutic activities  Barriers: Decreased endurance, Difficulty following instructions, Generalized weakness, Impaired activity tolerance, Impaired appetite/intake, Impaired balance, Limited mobility, Pain poorly managed    Plan    90 minute shower requested for tomorrow 2-25, pain mgmt, functional mobility, ADLs, iADLs when able    Occupational Therapy Goals     Problem: Bathing     Dates: Start: 02/23/21       Goal: STG-Within one week, patient will bathe     Dates: Start: 02/23/21       Description: 1) Individualized Goal:  with Mod A and AE as needed  2) Interventions:  OT Group Therapy, OT Self Care/ADL, OT Cognitive Skill Dev, OT Community Reintegration, OT Manual Ther Technique, OT Neuro Re-Ed/Balance, OT Therapeutic Activity, OT Evaluation, and OT Therapeutic Exercise                   Problem: Dressing     Dates: Start: 02/23/21       Goal: STG-Within one week, patient will dress LB     Dates: Start: 02/23/21       Description: 1) Individualized Goal:  with Mod A and AE as needed  2) Interventions:  OT Group Therapy, OT Self Care/ADL, OT Cognitive Skill Dev, OT Community Reintegration, OT Manual Ther Technique, OT Neuro Re-Ed/Balance, OT Therapeutic Activity, OT Evaluation, and OT Therapeutic Exercise                     Problem: OT Long Term Goals     Dates: Start: 02/23/21       Goal: LTG-By discharge, patient will complete basic self care tasks     Dates: Start: 02/23/21       Description: 1) Individualized  Goal:  with CGA and AE as needed  2) Interventions:  OT Group Therapy, OT Self Care/ADL, OT Cognitive Skill Dev, OT Community Reintegration, OT Manual Ther Technique, OT Neuro Re-Ed/Balance, OT Therapeutic Activity, OT Evaluation, and OT Therapeutic Exercise               Goal: LTG-By discharge, patient will perform bathroom transfers     Dates: Start: 02/23/21       Description: 1) Individualized Goal:  with CGA and AE as needed  2) Interventions:  OT Group Therapy, OT Self Care/ADL, OT Cognitive Skill Dev, OT Community Reintegration, OT Manual Ther Technique, OT Neuro Re-Ed/Balance, OT Therapeutic Activity, OT Evaluation, and OT Therapeutic Exercise                Problem: Toileting     Dates: Start: 02/23/21       Goal: STG-Within one week, patient will complete toileting tasks     Dates: Start: 02/23/21       Description: 1) Individualized Goal:  with CGA and AE as needed  2) Interventions:  OT Group Therapy, OT Self Care/ADL, OT Cognitive Skill Dev, OT Community Reintegration, OT Manual Ther Technique, OT Neuro Re-Ed/Balance, OT Therapeutic Activity, OT Evaluation, and OT Therapeutic Exercise

## 2021-02-24 NOTE — THERAPY
"Occupational Therapy  Daily Treatment     Patient Name: Nafisa Hancock  Age:  80 y.o., Sex:  female  Medical Record #: [id number]  Today's Date: 2/24/2021     Precautions  Precautions: Fall Risk, Weight Bearing As Tolerated Right Lower Extremity, Weight Bearing As Tolerated Right Upper Extremity  Comments: Antalgic; needs significantly more time to complete transfers         Subjective    \"I think I could put my socks on if I was in bed, but I don't think I can put them on in this chair.\"    \"I don't want Ryley to bring in my hair products because I don't want to be here that long. Are other patients going home today? Must be nice to be them.\"     Objective       02/24/21 1001   Functional Level of Assist   Grooming Standing;Supervision   Grooming Description Adaptive equipment;Increased time;Standing at sink;Set-up of equipment;Verbal cueing;Supervision for safety  (FWW)   Bathing   (offered but limited by antalgia.OT requested 90 min for tmrw)   Lower Body Dressing Moderate Assist  (donned/doffed socks)   Lower Body Dressing Description Sock aid;Increased time;Reacher;Verbal cueing;Supervision for safety;Set-up of equipment   Toileting Stand by Assist   Toileting Description Grab bar;Increased time;Set-up of equipment;Supervision for safety;Verbal cueing   Toilet Transfers Stand by Assist   Toilet Transfer Description Grab bar;Set-up of equipment;Supervision for safety   Interdisciplinary Plan of Care Collaboration   IDT Collaboration with  Nursing   Patient Position at End of Therapy Seated;Self Releasing Lap Belt Applied;Call Light within Reach;Tray Table within Reach;Phone within Reach   Collaboration Comments re: BM during session   OT Total Time Spent   OT Individual Total Time Spent (Mins) 60   OT Charge Group   OT Self Care / ADL 3   OT Therapy Activity 1       Assessment    Pt continues to be limited by antalgia. Pt improving to standing grooming. Pt reports she does not want to get her hair wet. Will " try to encourage pt to comb out knots tomorrow after shower. OTR encouraged pt to bring in her own products since she was uncomfortable using hospital hair care products.    Strengths: Alert and oriented, Independent prior level of function, Motivated for self care and independence, Pleasant and cooperative, Supportive family, Willingly participates in therapeutic activities  Barriers: Decreased endurance, Difficulty following instructions, Generalized weakness, Impaired activity tolerance, Impaired appetite/intake, Impaired balance, Limited mobility, Pain poorly managed    Plan    90 minute shower requested for tomorrow 2-25, pain mgmt, functional mobility, ADLs, iADLs when able    Occupational Therapy Goals     Problem: Bathing     Dates: Start: 02/23/21       Goal: STG-Within one week, patient will bathe     Dates: Start: 02/23/21       Description: 1) Individualized Goal:  with Mod A and AE as needed  2) Interventions:  OT Group Therapy, OT Self Care/ADL, OT Cognitive Skill Dev, OT Community Reintegration, OT Manual Ther Technique, OT Neuro Re-Ed/Balance, OT Therapeutic Activity, OT Evaluation, and OT Therapeutic Exercise                   Problem: Dressing     Dates: Start: 02/23/21       Goal: STG-Within one week, patient will dress LB     Dates: Start: 02/23/21       Description: 1) Individualized Goal:  with Mod A and AE as needed  2) Interventions:  OT Group Therapy, OT Self Care/ADL, OT Cognitive Skill Dev, OT Community Reintegration, OT Manual Ther Technique, OT Neuro Re-Ed/Balance, OT Therapeutic Activity, OT Evaluation, and OT Therapeutic Exercise                     Problem: OT Long Term Goals     Dates: Start: 02/23/21       Goal: LTG-By discharge, patient will complete basic self care tasks     Dates: Start: 02/23/21       Description: 1) Individualized Goal:  with CGA and AE as needed  2) Interventions:  OT Group Therapy, OT Self Care/ADL, OT Cognitive Skill Dev, OT Community Reintegration, OT  Manual Ther Technique, OT Neuro Re-Ed/Balance, OT Therapeutic Activity, OT Evaluation, and OT Therapeutic Exercise               Goal: LTG-By discharge, patient will perform bathroom transfers     Dates: Start: 02/23/21       Description: 1) Individualized Goal:  with CGA and AE as needed  2) Interventions:  OT Group Therapy, OT Self Care/ADL, OT Cognitive Skill Dev, OT Community Reintegration, OT Manual Ther Technique, OT Neuro Re-Ed/Balance, OT Therapeutic Activity, OT Evaluation, and OT Therapeutic Exercise                Problem: Toileting     Dates: Start: 02/23/21       Goal: STG-Within one week, patient will complete toileting tasks     Dates: Start: 02/23/21       Description: 1) Individualized Goal:  with CGA and AE as needed  2) Interventions:  OT Group Therapy, OT Self Care/ADL, OT Cognitive Skill Dev, OT Community Reintegration, OT Manual Ther Technique, OT Neuro Re-Ed/Balance, OT Therapeutic Activity, OT Evaluation, and OT Therapeutic Exercise

## 2021-02-25 LAB
APPEARANCE UR: ABNORMAL
BACTERIA #/AREA URNS HPF: ABNORMAL /HPF
BILIRUB UR QL STRIP.AUTO: NEGATIVE
COLOR UR: YELLOW
EPI CELLS #/AREA URNS HPF: ABNORMAL /HPF
GLUCOSE UR STRIP.AUTO-MCNC: NEGATIVE MG/DL
KETONES UR STRIP.AUTO-MCNC: ABNORMAL MG/DL
LEUKOCYTE ESTERASE UR QL STRIP.AUTO: ABNORMAL
MICRO URNS: ABNORMAL
NITRITE UR QL STRIP.AUTO: POSITIVE
PH UR STRIP.AUTO: 7 [PH] (ref 5–8)
PROT UR QL STRIP: 30 MG/DL
RBC # URNS HPF: ABNORMAL /HPF
RBC UR QL AUTO: ABNORMAL
SP GR UR STRIP.AUTO: 1.02
TRI-PHOS CRY #/AREA URNS HPF: ABNORMAL /HPF
UROBILINOGEN UR STRIP.AUTO-MCNC: 0.2 MG/DL
WBC #/AREA URNS HPF: ABNORMAL /HPF

## 2021-02-25 PROCEDURE — 87186 SC STD MICRODIL/AGAR DIL: CPT

## 2021-02-25 PROCEDURE — 81001 URINALYSIS AUTO W/SCOPE: CPT

## 2021-02-25 PROCEDURE — 97535 SELF CARE MNGMENT TRAINING: CPT

## 2021-02-25 PROCEDURE — 770010 HCHG ROOM/CARE - REHAB SEMI PRIVAT*

## 2021-02-25 PROCEDURE — 97116 GAIT TRAINING THERAPY: CPT | Mod: CQ

## 2021-02-25 PROCEDURE — 97110 THERAPEUTIC EXERCISES: CPT | Mod: CQ

## 2021-02-25 PROCEDURE — 99233 SBSQ HOSP IP/OBS HIGH 50: CPT | Performed by: PHYSICAL MEDICINE & REHABILITATION

## 2021-02-25 PROCEDURE — 97530 THERAPEUTIC ACTIVITIES: CPT | Mod: CQ

## 2021-02-25 PROCEDURE — 700111 HCHG RX REV CODE 636 W/ 250 OVERRIDE (IP): Performed by: PHYSICAL MEDICINE & REHABILITATION

## 2021-02-25 PROCEDURE — 87086 URINE CULTURE/COLONY COUNT: CPT

## 2021-02-25 PROCEDURE — 87077 CULTURE AEROBIC IDENTIFY: CPT

## 2021-02-25 PROCEDURE — A9270 NON-COVERED ITEM OR SERVICE: HCPCS | Performed by: PHYSICAL MEDICINE & REHABILITATION

## 2021-02-25 PROCEDURE — 700102 HCHG RX REV CODE 250 W/ 637 OVERRIDE(OP): Performed by: PHYSICAL MEDICINE & REHABILITATION

## 2021-02-25 RX ORDER — LISINOPRIL 20 MG/1
20 TABLET ORAL DAILY
Status: DISCONTINUED | OUTPATIENT
Start: 2021-02-26 | End: 2021-02-28

## 2021-02-25 RX ORDER — LISINOPRIL 5 MG/1
5 TABLET ORAL ONCE
Status: COMPLETED | OUTPATIENT
Start: 2021-02-25 | End: 2021-02-25

## 2021-02-25 RX ADMIN — ENOXAPARIN SODIUM 40 MG: 40 INJECTION SUBCUTANEOUS at 10:20

## 2021-02-25 RX ADMIN — HYDROCODONE BITARTRATE AND ACETAMINOPHEN 2 TABLET: 5; 325 TABLET ORAL at 16:33

## 2021-02-25 RX ADMIN — HYDROCODONE BITARTRATE AND ACETAMINOPHEN 2 TABLET: 5; 325 TABLET ORAL at 02:34

## 2021-02-25 RX ADMIN — ESTRADIOL 0.5 MG: 1 TABLET ORAL at 07:56

## 2021-02-25 RX ADMIN — LISINOPRIL 15 MG: 5 TABLET ORAL at 07:55

## 2021-02-25 RX ADMIN — HYDROCODONE BITARTRATE AND ACETAMINOPHEN 2 TABLET: 5; 325 TABLET ORAL at 21:01

## 2021-02-25 RX ADMIN — LISINOPRIL 5 MG: 5 TABLET ORAL at 10:20

## 2021-02-25 RX ADMIN — HYDROCODONE BITARTRATE AND ACETAMINOPHEN 2 TABLET: 5; 325 TABLET ORAL at 07:08

## 2021-02-25 RX ADMIN — HYDROCODONE BITARTRATE AND ACETAMINOPHEN 2 TABLET: 5; 325 TABLET ORAL at 12:42

## 2021-02-25 RX ADMIN — TRAZODONE HYDROCHLORIDE 50 MG: 50 TABLET ORAL at 21:01

## 2021-02-25 RX ADMIN — HYDRALAZINE HYDROCHLORIDE 25 MG: 25 TABLET, FILM COATED ORAL at 15:02

## 2021-02-25 RX ADMIN — MEDROXYPROGESTERONE ACETATE 2.5 MG: 2.5 TABLET ORAL at 07:56

## 2021-02-25 ASSESSMENT — ACTIVITIES OF DAILY LIVING (ADL)
TUB_SHOWER_TRANSFER_DESCRIPTION: GRAB BAR;SHOWER BENCH;INCREASED TIME;SET-UP OF EQUIPMENT;SUPERVISION FOR SAFETY;VERBAL CUEING
TOILETING_LEVEL_OF_ASSIST_DESCRIPTION: GRAB BAR;INCREASED TIME;SET-UP OF EQUIPMENT;SUPERVISION FOR SAFETY;VERBAL CUEING
TOILET_TRANSFER_DESCRIPTION: GRAB BAR;INCREASED TIME;SET-UP OF EQUIPMENT;SUPERVISION FOR SAFETY;VERBAL CUEING

## 2021-02-25 ASSESSMENT — GAIT ASSESSMENTS
DEVIATION: BRADYKINETIC;DECREASED BASE OF SUPPORT;OTHER (COMMENT)
GAIT LEVEL OF ASSIST: STAND BY ASSIST
ASSISTIVE DEVICE: FRONT WHEEL WALKER
DISTANCE (FEET): 70

## 2021-02-25 ASSESSMENT — PAIN DESCRIPTION - PAIN TYPE
TYPE: ACUTE PAIN
TYPE: ACUTE PAIN

## 2021-02-25 NOTE — THERAPY
"Physical Therapy   Daily Treatment     Patient Name: Nafisa Hancock  Age:  80 y.o., Sex:  female  Medical Record #: [id number]  Today's Date: 2/25/2021     Precautions  Precautions: Fall Risk, Weight Bearing As Tolerated Right Lower Extremity, Weight Bearing As Tolerated Right Upper Extremity  Comments: Antalgic; needs significantly more time to complete transfers    Subjective    Pt agreed to PT     \"are you the one that fixed my walker so that it was easier for me to walk?\"     Objective       02/25/21 1231   Transfer Functional Level of Assist   Bed, Chair, Wheelchair Transfer Stand by Assist   Bed Chair Wheelchair Transfer Description Adaptive equipment;Verbal cueing;Supervision for safety;Increased time;Set-up of equipment   Supine Lower Body Exercise   Supine Lower Body Exercises Yes   Bridges 2 sets of 10;Two Legged   Straight Leg Raises 2 sets of 10;Bilateral  (AAROM R LE)   Knee to Chest 2 sets of 10;Bilateral  (AAROM RLE)   Short Arc Quad 2 sets of 10;Right    Ankle Pumps 2 sets of 10;Bilateral   Gluteal Isometrics 2 sets of 10;Bilateral   Quadriceps Isometrics 2 sets of 10;Right   Bed Mobility    Sit to Supine Moderate Assist  (for B LE)   Sit to Stand Stand by Assist   Neuro-Muscular Treatments   Neuro-Muscular Treatments Postural Changes;Postural Facilitation;Verbal Cuing;Sequencing   Comments unsupported sitting at EOB AA trunk flexion and extension 2 x 10 with rolling bed side table, reaching forward out of BREANNE with B UE in 90deg shoulder flexion 2 x 10, side bending onto forearms B 2 x 5 each direction   Interdisciplinary Plan of Care Collaboration   Patient Position at End of Therapy In Bed;Call Light within Reach   PT Total Time Spent   PT Individual Total Time Spent (Mins) 60   PT Charge Group   PT Therapeutic Exercise 3   PT Therapeutic Activities 1   Supervising Physical Therapist Marilu Solorzano     Instructed pt on use of gait belt for modified leg     Repositioned pt for comfort in bed " at end of tx session with pillow under R hip to facilitate pressure relief, instructed pt on frequent changes in positioning    Assessment    Pt demonstrates improved STS transfers and tolerated LE exercises with report of less pain. Pt required modA to transition into supine position in the bed. Pt may benefit from formal leg  vs gait belt in place of leg  for increased independence.   Strengths: Able to follow instructions, Adequate strength, Alert and oriented, Effective communication skills, Good carryover of learning, Good insight into deficits/needs, Independent prior level of function, Motivated for self care and independence, Pleasant and cooperative, Willingly participates in therapeutic activities  Barriers: Decreased endurance, Fatigue, Impaired activity tolerance, Impaired balance, Limited mobility    Plan    Decrease A with bed mobility; FWW safety and usage with transfers and ambulation; ther ex for AROM and strengthening; education for pain management, trial step when patient cleared to leave room.    Physical Therapy Problems     Problem: Mobility     Dates: Start: 02/23/21       Goal: STG-Within one week, patient will ambulate up/down a curb     Dates: Start: 02/23/21       Description: 1) Individualized goal:  CGA with LRAD to ambulate up single step to get into home.   2) Interventions:  PT E Stim Attended, PT Gait Training, PT Self Care/Home Eval, PT Therapeutic Exercises, PT Neuro Re-Ed/Balance, PT Aquatic Therapy, PT Therapeutic Activity, and PT Manual Therapy                  Problem: Mobility Transfers     Dates: Start: 02/23/21       Goal: STG-Within one week, patient will perform bed mobility     Dates: Start: 02/23/21       Description: 1) Individualized goal:  Supervised to demonstrate improved bed mobility skills for independence at home.  2) Interventions:  PT E Stim Attended, PT Gait Training, PT Self Care/Home Eval, PT Therapeutic Exercises, PT Neuro Re-Ed/Balance, PT  Aquatic Therapy, PT Therapeutic Activity, and PT Manual Therapy          Goal: STG-Within one week, patient will sit to stand     Dates: Start: 02/23/21       Description: 1) Individualized goal:  SBA from WC with LRAD.   2) Interventions:  PT E Stim Attended, PT Gait Training, PT Self Care/Home Eval, PT Therapeutic Exercises, PT Neuro Re-Ed/Balance, PT Aquatic Therapy, PT Therapeutic Activity, and PT Manual Therapy                Problem: PT-Long Term Goals     Dates: Start: 02/23/21       Goal: LTG-By discharge, patient will ambulate     Dates: Start: 02/23/21       Description: 1) Individualized goal:  150' supervised to demonstrate improved independence in home.   2) Interventions:  PT E Stim Attended, PT Gait Training, PT Self Care/Home Eval, PT Therapeutic Exercises, PT Neuro Re-Ed/Balance, PT Aquatic Therapy, PT Therapeutic Activity, and PT Manual Therapy          Goal: LTG-By discharge, patient will perform home exercise program     Dates: Start: 02/23/21       Description: 1) Individualized goal:  Independently to demonstrate independence with care at home.   2) Interventions:  PT E Stim Attended, PT Gait Training, PT Self Care/Home Eval, PT Therapeutic Exercises, PT Neuro Re-Ed/Balance, PT Aquatic Therapy, PT Therapeutic Activity, and PT Manual Therapy          Goal: LTG-By discharge, patient will ambulate up/down 4-6 stairs     Dates: Start: 02/23/21       Description: 1) Individualized goal:  Supervised with 1HR to demonstrate improved independence with community ambulation.   2) Interventions:  PT E Stim Attended, PT Gait Training, PT Self Care/Home Eval, PT Therapeutic Exercises, PT Neuro Re-Ed/Balance, PT Aquatic Therapy, PT Therapeutic Activity, and PT Manual Therapy          Goal: LTG-By discharge, patient will transfer in/out of a car     Dates: Start: 02/23/21       Description: 1) Individualized goal:  SBA and minimal verbal cuing for transfer home.   2) Interventions:  PT E Stim Attended, PT  Gait Training, PT Self Care/Home Eval, PT Therapeutic Exercises, PT Neuro Re-Ed/Balance, PT Aquatic Therapy, PT Therapeutic Activity, and PT Manual Therapy

## 2021-02-25 NOTE — PROGRESS NOTES
"Rehab Progress Note     Encounter Date: 2/25/2021    CC: Pain much improved.    Interval Events (Subjective)  Elevated blood pressure into the 160s systolic.  Remains high normal otherwise ranging from 134 systolic to 151 systolic.  Other vital signs stable and within normal limits.  Did receive as needed hydralazine for elevated blood pressure 2/24 evening.  Voiding into adult briefs.  Last BM 2/23 large loose.  Patient seen and examined in her room between therapies.  She states that the switch from Percocet to hydrocodone made a world of difference and she is feeling much better.  Discussed with her updates regarding blood pressure medication changes.  States that she is doing okay in terms of bowel movements.  Discussed with her initiation of prophylactic Lovenox after discussion with the surgeon.  Patient amenable to plan.  Denies any fevers, chills, chest pain, shortness of breath, nausea, vomiting.  Pain better controlled.    14 point ROS reviewed and negative except as stated above.     Objective:  VITAL SIGNS: /90   Pulse 87   Temp 36.7 °C (98 °F) (Oral)   Resp 16   Ht 1.651 m (5' 5\")   Wt 58.7 kg (129 lb 8 oz)   LMP 01/13/2017 (Exact Date) Comment: Due to hormone therapy to prevent cancer  SpO2 96%   BMI 21.55 kg/m²     GEN: No apparent distress, sitting up in chair watching TV.  HEENT: Head normocephalic, atraumatic.  Sclera nonicteric bilaterally, no ocular discharge appreciated bilaterally.  CV: Extremities warm and well-perfused, no peripheral edema appreciated bilaterally.  PULMONARY: Breathing nonlabored on room air, no respiratory accessory muscle use.  Not requiring supplemental oxygen.  ABD: Soft, nontender.  SKIN: No appreciable skin breakdown on exposed areas of skin.  NEURO: Awake alert.  Conversational.  Logical thought content.  PSYCH: Mood and affect within normal limits.      Recent Results (from the past 72 hour(s))   SARS-CoV-2 PCR (24 hour In-House): Collect NP swab in [facility] "    Collection Time: 02/22/21  6:45 PM    Specimen: Nasopharyngeal; Respirate   Result Value Ref Range    SARS-CoV-2 Source NP Swab     SARS-CoV-2 by PCR NotDetected    CBC with Differential    Collection Time: 02/23/21  6:48 AM   Result Value Ref Range    WBC 9.1 4.8 - 10.8 K/uL    RBC 4.26 4.20 - 5.40 M/uL    Hemoglobin 12.8 12.0 - 16.0 g/dL    Hematocrit 38.9 37.0 - 47.0 %    MCV 91.3 81.4 - 97.8 fL    MCH 30.0 27.0 - 33.0 pg    MCHC 32.9 (L) 33.6 - 35.0 g/dL    RDW 45.2 35.9 - 50.0 fL    Platelet Count 320 164 - 446 K/uL    MPV 9.8 9.0 - 12.9 fL    Neutrophils-Polys 67.70 44.00 - 72.00 %    Lymphocytes 20.90 (L) 22.00 - 41.00 %    Monocytes 7.80 0.00 - 13.40 %    Eosinophils 2.50 0.00 - 6.90 %    Basophils 0.70 0.00 - 1.80 %    Immature Granulocytes 0.40 0.00 - 0.90 %    Nucleated RBC 0.00 /100 WBC    Neutrophils (Absolute) 6.19 2.00 - 7.15 K/uL    Lymphs (Absolute) 1.91 1.00 - 4.80 K/uL    Monos (Absolute) 0.71 0.00 - 0.85 K/uL    Eos (Absolute) 0.23 0.00 - 0.51 K/uL    Baso (Absolute) 0.06 0.00 - 0.12 K/uL    Immature Granulocytes (abs) 0.04 0.00 - 0.11 K/uL    NRBC (Absolute) 0.00 K/uL   Comp Metabolic Panel (CMP)    Collection Time: 02/23/21  6:48 AM   Result Value Ref Range    Sodium 136 135 - 145 mmol/L    Potassium 3.7 3.6 - 5.5 mmol/L    Chloride 100 96 - 112 mmol/L    Co2 26 20 - 33 mmol/L    Anion Gap 10.0 7.0 - 16.0    Glucose 108 (H) 65 - 99 mg/dL    Bun 23 (H) 8 - 22 mg/dL    Creatinine 0.62 0.50 - 1.40 mg/dL    Calcium 9.1 8.5 - 10.5 mg/dL    AST(SGOT) 24 12 - 45 U/L    ALT(SGPT) 19 2 - 50 U/L    Alkaline Phosphatase 63 30 - 99 U/L    Total Bilirubin 0.6 0.1 - 1.5 mg/dL    Albumin 3.9 3.2 - 4.9 g/dL    Total Protein 6.9 6.0 - 8.2 g/dL    Globulin 3.0 1.9 - 3.5 g/dL    A-G Ratio 1.3 g/dL   HEMOGLOBIN A1C    Collection Time: 02/23/21  6:48 AM   Result Value Ref Range    Glycohemoglobin 5.7 (H) 0.0 - 5.6 %    Est Avg Glucose 117 mg/dL   Magnesium    Collection Time: 02/23/21  6:48 AM   Result Value Ref  Range    Magnesium 2.3 1.5 - 2.5 mg/dL   Phosphorus    Collection Time: 02/23/21  6:48 AM   Result Value Ref Range    Phosphorus 3.2 2.5 - 4.5 mg/dL   TSH with Reflex to FT4    Collection Time: 02/23/21  6:48 AM   Result Value Ref Range    TSH 1.610 0.380 - 5.330 uIU/mL   Vitamin D, 25-hydroxy (blood)    Collection Time: 02/23/21  6:48 AM   Result Value Ref Range    25-Hydroxy   Vitamin D 25 70 30 - 100 ng/mL   ESTIMATED GFR    Collection Time: 02/23/21  6:48 AM   Result Value Ref Range    GFR If African American >60 >60 mL/min/1.73 m 2    GFR If Non African American >60 >60 mL/min/1.73 m 2       Current Facility-Administered Medications   Medication Frequency   • HYDROcodone-acetaminophen (NORCO) 5-325 MG per tablet 1 tablet Q4HRS PRN   • HYDROcodone-acetaminophen (NORCO) 5-325 MG per tablet 2 tablet Q4HRS PRN   • lisinopril (PRINIVIL) tablet 15 mg DAILY   • senna-docusate (PERICOLACE or SENOKOT S) 8.6-50 MG per tablet 2 tablet BID    And   • polyethylene glycol/lytes (MIRALAX) PACKET 1 Packet QDAY PRN    And   • magnesium hydroxide (MILK OF MAGNESIA) suspension 30 mL QDAY PRN    And   • bisacodyl (DULCOLAX) suppository 10 mg QDAY PRN   • acetaminophen (Tylenol) tablet 650 mg Q6HRS PRN   • estradiol (ESTRACE) tablet 0.5 mg DAILY   • medroxyPROGESTERone (PROVERA) tablet 2.5 mg DAILY   • Respiratory Therapy Consult Continuous RT   • Pharmacy Consult Request ...Pain Management Review 1 Each PHARMACY TO DOSE   • hydrALAZINE (APRESOLINE) tablet 25 mg Q8HRS PRN   • artificial tears ophthalmic solution 1 Drop PRN   • benzocaine-menthol (CEPACOL) lozenge 1 Lozenge Q2HRS PRN   • mag hydrox-al hydrox-simeth (MAALOX PLUS ES or MYLANTA DS) suspension 20 mL Q2HRS PRN   • ondansetron (ZOFRAN ODT) dispertab 4 mg 4X/DAY PRN    Or   • ondansetron (ZOFRAN) syringe/vial injection 4 mg 4X/DAY PRN   • traZODone (DESYREL) tablet 50 mg QHS PRN   • sodium chloride (OCEAN) 0.65 % nasal spray 2 Spray PRN       Orders Placed This Encounter    Procedures   • Diet Order Diet: Regular     Standing Status:   Standing     Number of Occurrences:   1     Order Specific Question:   Diet:     Answer:   Regular [1]       Assessment:  Active Hospital Problems    Diagnosis    • *Pelvic fracture (HCC)    • Leukocytosis    • Pelvic hematoma in female    • Postmenopausal    • Opiate dependence (HCC)    • HTN (hypertension)    • Osteoporosis, senile        Medical Decision Making and Plan:  Debility/deconditioning secondary to multiple closed fractures of the pelvis after fall 2/17/2021 (X-rays and CT scan of the pelvis show fractures of the rami on the right side with some mild displacement. There is likely a small anterior sacral fracture is nondisplaced) nonoperative management recommended per Ortho (Dr. Telles).  -Initiate acute inpatient rehabilitation program involving physical therapy and Occupational Therapy  -Weightbearing as tolerated  -Follow-up Ortho 4 weeks (approximately mid March)     Nociceptive pain: Has pain contract with [facility].  Per her report has prescription for hydrocodone  mg 4 times a day as needed.  However patient states she does not take that much and often cuts them in half.  -Continue Percocet 5 to 10 mg as needed pain every 4 hours--> changed to hydrocodone per patient request given that she is normally on hydrocodone through her pain management group.  -Hydrocodone 5-325 mg for moderate pain, hydrocodone  mg for severe pain.  -Follow-up pain 2/25 -improved with change to hydrocodone.     Osteoarthritis: Followed by [facility] pain. Per her report has prescription for hydrocodone  mg 4 times a day as needed.      Hypertension: Transferred on lisinopril 10 mg.  Had been requiring labetalol IV as needed while in acute.  -Increase to 15 mg for continued elevated systolic 2/23--> increase to 20 mg 2/25.  -2/24 continues to be elevated into 150s intermittently.  Will monitor one more day and increase amlodipine if  needed since just increased 2/23.  Required hydralazine evening of 2/24.  Has systolic ranging from mid 130s-150s and even into the 160s on occasion.  Increase as aforementioned 2/25.  -Continue to monitor blood pressure.     Postmenopausal: On estradiol + Provera on transfer.  Continue.     Elevated BUN: Encourage fluids.   -Repeat BMP 2/26     Leukocytosis: Resolved on admission.     Vitamin D: High at 70.  Hold on supplementation.     Bowel: Senokot 2 tablets twice daily + as needed bowel meds.  -Last BM 2/23 loose  -Continue to monitor     Bladder: Pelvic hematoma near bladder.  CT confirmed no bladder involvement.  Had Sandoval removed prior to transfer. Post void 31, 32, 28 cc.   -PVRs low okay to discontinue.  - Dysuria 2/25 holding urine at night. Check UA     DVT prophylaxis: Chemoprophylaxis on hold due to pelvic hematoma.  -SCDs  -Contacted surgeon regarding chemoprophylaxis recommendations 2/22, reached out again 2/25 -no reimaging needed given pelvic hematoma, okay to start prophylactic Lovenox.  Started 2/25.     Skin: Right elbow abrasion.  X-ray negative.     Total time: 35 minutes.  I spent greater than 50% of the time for patient care and coordination on this date, including unit/floor time, and face-to-face time with the patient as per assessment and plan above.    Nohemi Cosby D.O.    IDT Team Meeting 2/25/2021    I, Nohemi Cosby D.O., was present and led the interdisciplinary team conference on 2/25/2021.  I led the IDT conference and agree with the IDT conference documentation and plan of care as noted below.     RN: Continent during day. Possibly incontinent. Has suprapubic pain.   PT: 2/4 goals met. Pain improved. Impaired problem solving, bradykinetic.   OT: Fxnl improvement. Met goals. Time consuming. Sup-CGA for ADLs.   CM: Continues to work on disposition and DME needs.   DC/Disposition:  - FARIB[facility]: 3/3-3/4 depending on PPS

## 2021-02-25 NOTE — THERAPY
"Physical Therapy   Daily Treatment     Patient Name: Nafisa Hancock  Age:  80 y.o., Sex:  female  Medical Record #: [id number]  Today's Date: 2/25/2021     Precautions  Precautions: Fall Risk, Weight Bearing As Tolerated Right Lower Extremity, Weight Bearing As Tolerated Right Upper Extremity  Comments: Antalgic; needs significantly more time to complete transfers    Subjective    Pt seated in wc upon arrival. Agreed to PT      Objective       02/25/21 1031   Gait Functional Level of Assist    Gait Level Of Assist Stand by Assist   Assistive Device Front Wheel Walker   Distance (Feet) 70   # of Times Distance was Traveled 1   Deviation Bradykinetic;Decreased Base Of Support;Other (Comment)  (in 15:34\")   Transfer Functional Level of Assist   Bed, Chair, Wheelchair Transfer Stand by Assist   Bed Chair Wheelchair Transfer Description Adaptive equipment;Supervision for safety;Verbal cueing;Set-up of equipment;Increased time;Assist with two limbs   Bed Mobility    Sit to Supine Moderate Assist   Sit to Stand Stand by Assist  (vc for sequencing)   5x STS (Five Times Sit to Stand Test)   Height of Sitting Surface (inches) 19   5x Sit to STand (seconds) 57   Score Definition Fall Risk   Sit to Stand Comments vc for sequencing/hand placement   Interdisciplinary Plan of Care Collaboration   IDT Collaboration with  Other (See Comments)   Patient Position at End of Therapy Seated;Self Releasing Lap Belt Applied;Call Light within Reach;Tray Table within Reach   Collaboration Comments CLOF   PT Total Time Spent   PT Individual Total Time Spent (Mins) 30   PT Charge Group   PT Gait Training 1   PT Therapeutic Activities 1   Supervising Physical Therapist Marilu Solorzano       Assessment    Pt tolerated treatment session well and is demonstrating improvement in STS transfers as well as ambulation distance this session. Pain is more controlled today.  Strengths: Able to follow instructions, Adequate strength, Alert and oriented, " Effective communication skills, Good carryover of learning, Good insight into deficits/needs, Independent prior level of function, Motivated for self care and independence, Pleasant and cooperative, Willingly participates in therapeutic activities  Barriers: Decreased endurance, Fatigue, Impaired activity tolerance, Impaired balance, Limited mobility    Plan    Decrease A with bed mobility; FWW safety and usage with transfers and ambulation; ther ex for AROM and strengthening; education for pain management and POC    Physical Therapy Problems     Problem: Mobility     Dates: Start: 02/23/21       Goal: STG-Within one week, patient will ambulate up/down a curb     Dates: Start: 02/23/21       Description: 1) Individualized goal:  CGA with LRAD to ambulate up single step to get into home.   2) Interventions:  PT E Stim Attended, PT Gait Training, PT Self Care/Home Eval, PT Therapeutic Exercises, PT Neuro Re-Ed/Balance, PT Aquatic Therapy, PT Therapeutic Activity, and PT Manual Therapy                  Problem: Mobility Transfers     Dates: Start: 02/23/21       Goal: STG-Within one week, patient will perform bed mobility     Dates: Start: 02/23/21       Description: 1) Individualized goal:  Supervised to demonstrate improved bed mobility skills for independence at home.  2) Interventions:  PT E Stim Attended, PT Gait Training, PT Self Care/Home Eval, PT Therapeutic Exercises, PT Neuro Re-Ed/Balance, PT Aquatic Therapy, PT Therapeutic Activity, and PT Manual Therapy          Goal: STG-Within one week, patient will sit to stand     Dates: Start: 02/23/21       Description: 1) Individualized goal:  SBA from  with LRAD.   2) Interventions:  PT E Stim Attended, PT Gait Training, PT Self Care/Home Eval, PT Therapeutic Exercises, PT Neuro Re-Ed/Balance, PT Aquatic Therapy, PT Therapeutic Activity, and PT Manual Therapy                Problem: PT-Long Term Goals     Dates: Start: 02/23/21       Goal: LTG-By discharge,  patient will ambulate     Dates: Start: 02/23/21       Description: 1) Individualized goal:  150' supervised to demonstrate improved independence in home.   2) Interventions:  PT E Stim Attended, PT Gait Training, PT Self Care/Home Eval, PT Therapeutic Exercises, PT Neuro Re-Ed/Balance, PT Aquatic Therapy, PT Therapeutic Activity, and PT Manual Therapy          Goal: LTG-By discharge, patient will perform home exercise program     Dates: Start: 02/23/21       Description: 1) Individualized goal:  Independently to demonstrate independence with care at home.   2) Interventions:  PT E Stim Attended, PT Gait Training, PT Self Care/Home Eval, PT Therapeutic Exercises, PT Neuro Re-Ed/Balance, PT Aquatic Therapy, PT Therapeutic Activity, and PT Manual Therapy          Goal: LTG-By discharge, patient will ambulate up/down 4-6 stairs     Dates: Start: 02/23/21       Description: 1) Individualized goal:  Supervised with 1HR to demonstrate improved independence with community ambulation.   2) Interventions:  PT E Stim Attended, PT Gait Training, PT Self Care/Home Eval, PT Therapeutic Exercises, PT Neuro Re-Ed/Balance, PT Aquatic Therapy, PT Therapeutic Activity, and PT Manual Therapy          Goal: LTG-By discharge, patient will transfer in/out of a car     Dates: Start: 02/23/21       Description: 1) Individualized goal:  SBA and minimal verbal cuing for transfer home.   2) Interventions:  PT E Stim Attended, PT Gait Training, PT Self Care/Home Eval, PT Therapeutic Exercises, PT Neuro Re-Ed/Balance, PT Aquatic Therapy, PT Therapeutic Activity, and PT Manual Therapy

## 2021-02-25 NOTE — THERAPY
"Physical Therapy   Daily Treatment     Patient Name: Nafisa Hancock  Age:  80 y.o., Sex:  female  Medical Record #: [id number]  Today's Date: 2/24/2021     Precautions  Precautions: Fall Risk, Weight Bearing As Tolerated Right Lower Extremity, Weight Bearing As Tolerated Right Upper Extremity  Comments: (P) Antalgic; needs significantly more time to complete transfers    Subjective    Pt was agreeable to PT, requested to go to the [facility]    \"I dont know if I am going to make it\"     Objective       02/24/21 1301   Precautions   Precautions Fall Risk;Weight Bearing As Tolerated Right Lower Extremity;Weight Bearing As Tolerated Right Upper Extremity   Comments Antalgic; needs significantly more time to complete transfers   Pain   Intervention Emotional Support;Repositioned   Pain 0 - 10 Group   Location Pelvis   Location Orientation Posterior;Right   Description Aching;Sore   Comfort Goal Comfort with Movement;Perform Activity   Therapist Pain Assessment Nurse Notified;Post Activity  (did not quantify, moderate pain)   Gait Functional Level of Assist    Gait Level Of Assist Contact Guard Assist   Assistive Device Front Wheel Walker   Distance (Feet) 20   # of Times Distance was Traveled 1   Deviation Antalgic;Decreased Base Of Support;Decreased Heel Strike;Decreased Toe Off   Transfer Functional Level of Assist   Bed, Chair, Wheelchair Transfer Contact Guard Assist   Bed Chair Wheelchair Transfer Description Adaptive equipment;Increased time;Supervision for safety;Set-up of equipment;Verbal cueing   Toilet Transfers Contact Guard Assist   Toilet Transfer Description Grab bar;Set-up of equipment;Supervision for safety;Increased time;Verbal cueing   Bed Mobility    Supine to Sit Moderate Assist   Sit to Supine Moderate Assist   Sit to Stand Minimal Assist   Scooting Maximal Assist   Neuro-Muscular Treatments   Neuro-Muscular Treatments Verbal Cuing;Sequencing;Anterior weight shift;Postural Facilitation   Comments STS " transfer training from wc <> FWW with emphasis on sequencing and hand placement   Interdisciplinary Plan of Care Collaboration   IDT Collaboration with  Nursing   Patient Position at End of Therapy In Bed;Call Light within Reach   Collaboration Comments notified RN of pt with loose stool/bowel accident during therapy session   PT Total Time Spent   PT Individual Total Time Spent (Mins) 60   PT Charge Group   PT Gait Training 1   PT Therapeutic Activities 3   Supervising Physical Therapist Inderjit Ortiz       Assessment    Pt requires significantly increased time to perform all activities due to pain. Benefits from vc/redirection to task throughout session. PT session primarily limited by bowel incontinent episode requiring brief change and assistance for thoroughly cleaning inocencio area. Notified RN that patient had loose bm/accident during therapy. Pt has difficulty with rotational movements, specifically with sit <> supine  Strengths: Able to follow instructions, Adequate strength, Alert and oriented, Effective communication skills, Good carryover of learning, Good insight into deficits/needs, Independent prior level of function, Motivated for self care and independence, Pleasant and cooperative, Willingly participates in therapeutic activities  Barriers: Decreased endurance, Fatigue, Impaired activity tolerance, Impaired balance, Limited mobility    Plan    Decrease A with bed mobility; FWW safety and usage with transfers and ambulation; ther ex for AROM and strengthening; education for pain management and POC    Physical Therapy Problems     Problem: Mobility     Dates: Start: 02/23/21       Goal: STG-Within one week, patient will ambulate household distance     Dates: Start: 02/23/21       Description: 1) Individualized goal:  Of 50' with two turns on a flat surface CGA with LRAD to improve ambulation in home.   2) Interventions:  PT E Stim Attended, PT Gait Training, PT Self Care/Home Eval, PT Therapeutic  Exercises, PT Neuro Re-Ed/Balance, PT Aquatic Therapy, PT Therapeutic Activity, and PT Manual Therapy          Goal: STG-Within one week, patient will ambulate up/down a curb     Dates: Start: 02/23/21       Description: 1) Individualized goal:  CGA with LRAD to ambulate up single step to get into home.   2) Interventions:  PT E Stim Attended, PT Gait Training, PT Self Care/Home Eval, PT Therapeutic Exercises, PT Neuro Re-Ed/Balance, PT Aquatic Therapy, PT Therapeutic Activity, and PT Manual Therapy                  Problem: Mobility Transfers     Dates: Start: 02/23/21       Goal: STG-Within one week, patient will perform bed mobility     Dates: Start: 02/23/21       Description: 1) Individualized goal:  Supervised to demonstrate improved bed mobility skills for independence at home.  2) Interventions:  PT E Stim Attended, PT Gait Training, PT Self Care/Home Eval, PT Therapeutic Exercises, PT Neuro Re-Ed/Balance, PT Aquatic Therapy, PT Therapeutic Activity, and PT Manual Therapy          Goal: STG-Within one week, patient will sit to stand     Dates: Start: 02/23/21       Description: 1) Individualized goal:  SBA from  with LRAD.   2) Interventions:  PT E Stim Attended, PT Gait Training, PT Self Care/Home Eval, PT Therapeutic Exercises, PT Neuro Re-Ed/Balance, PT Aquatic Therapy, PT Therapeutic Activity, and PT Manual Therapy                Problem: PT-Long Term Goals     Dates: Start: 02/23/21       Goal: LTG-By discharge, patient will ambulate     Dates: Start: 02/23/21       Description: 1) Individualized goal:  150' supervised to demonstrate improved independence in home.   2) Interventions:  PT E Stim Attended, PT Gait Training, PT Self Care/Home Eval, PT Therapeutic Exercises, PT Neuro Re-Ed/Balance, PT Aquatic Therapy, PT Therapeutic Activity, and PT Manual Therapy          Goal: LTG-By discharge, patient will perform home exercise program     Dates: Start: 02/23/21       Description: 1) Individualized  goal:  Independently to demonstrate independence with care at home.   2) Interventions:  PT E Stim Attended, PT Gait Training, PT Self Care/Home Eval, PT Therapeutic Exercises, PT Neuro Re-Ed/Balance, PT Aquatic Therapy, PT Therapeutic Activity, and PT Manual Therapy          Goal: LTG-By discharge, patient will ambulate up/down 4-6 stairs     Dates: Start: 02/23/21       Description: 1) Individualized goal:  Supervised with 1HR to demonstrate improved independence with community ambulation.   2) Interventions:  PT E Stim Attended, PT Gait Training, PT Self Care/Home Eval, PT Therapeutic Exercises, PT Neuro Re-Ed/Balance, PT Aquatic Therapy, PT Therapeutic Activity, and PT Manual Therapy          Goal: LTG-By discharge, patient will transfer in/out of a car     Dates: Start: 02/23/21       Description: 1) Individualized goal:  SBA and minimal verbal cuing for transfer home.   2) Interventions:  PT E Stim Attended, PT Gait Training, PT Self Care/Home Eval, PT Therapeutic Exercises, PT Neuro Re-Ed/Balance, PT Aquatic Therapy, PT Therapeutic Activity, and PT Manual Therapy

## 2021-02-25 NOTE — CARE PLAN
Problem: Mobility  Goal: STG-Within one week, patient will ambulate household distance  Description: 1) Individualized goal:  Of 50' with two turns on a flat surface CGA with LRAD to improve ambulation in home.   2) Interventions:  PT E Stim Attended, PT Gait Training, PT Self Care/Home Eval, PT Therapeutic Exercises, PT Neuro Re-Ed/Balance, PT Aquatic Therapy, PT Therapeutic Activity, and PT Manual Therapy  Outcome: MET     Problem: Mobility  Goal: STG-Within one week, patient will ambulate up/down a curb  Description: 1) Individualized goal:  CGA with LRAD to ambulate up single step to get into home.   2) Interventions:  PT E Stim Attended, PT Gait Training, PT Self Care/Home Eval, PT Therapeutic Exercises, PT Neuro Re-Ed/Balance, PT Aquatic Therapy, PT Therapeutic Activity, and PT Manual Therapy    Outcome: NOT MET     Problem: Mobility Transfers  Goal: STG-Within one week, patient will perform bed mobility  Description: 1) Individualized goal:  Supervised to demonstrate improved bed mobility skills for independence at home.  2) Interventions:  PT E Stim Attended, PT Gait Training, PT Self Care/Home Eval, PT Therapeutic Exercises, PT Neuro Re-Ed/Balance, PT Aquatic Therapy, PT Therapeutic Activity, and PT Manual Therapy  Outcome: NOT MET  Goal: STG-Within one week, patient will sit to stand  Description: 1) Individualized goal:  SBA from  with LRAD.   2) Interventions:  PT E Stim Attended, PT Gait Training, PT Self Care/Home Eval, PT Therapeutic Exercises, PT Neuro Re-Ed/Balance, PT Aquatic Therapy, PT Therapeutic Activity, and PT Manual Therapy  Outcome: NOT MET     Slower than anticipated progress.

## 2021-02-25 NOTE — THERAPY
"Occupational Therapy  Daily Treatment     Patient Name: Nafisa Hancock  Age:  80 y.o., Sex:  female  Medical Record #: [id number]  Today's Date: 2/25/2021     Precautions  Precautions: Fall Risk, Weight Bearing As Tolerated Right Lower Extremity, Weight Bearing As Tolerated Right Upper Extremity  Comments: Antalgic; needs significantly more time to complete transfers         Subjective    \"I can't believe I can't do this.\" Pt stated through tears in shower. Pt was able to calm self and continue with session     Objective       02/25/21 0831   Pain 0 - 10 Group   Location Pelvis;Flank   Location Orientation Posterior;Right;Inner;Outer   Pain Rating Scale (NPRS) 8   Description Aching   Comfort Goal Perform Activity;Stay Alert;Sleep Comfortably   Active ROM Upper Body   Comments LUE shoulder abduction limited to 95 when standing. Differs positionally   Functional Level of Assist   Eating Modified Independent   Eating Description Increased time   Grooming Supervision;Standing   Grooming Description Increased time;Set-up of equipment;Standing at sink;Supervision for safety;Verbal cueing   Bathing Contact Guard Assist   Bathing Description Adaptive equipment;Grab bar;Hand held shower;Tub bench;Increased time;Set-up of equipment;Supervision for safety;Verbal cueing  (Pt decline A for BLE, instead poured soap and rinsed )   Upper Body Dressing Supervision  (Set up only)   Upper Body Dressing Description Increased time;Set-up of equipment   Lower Body Dressing Stand by Assist   Lower Body Dressing Description Grab bar;Reacher;Increased time;Set-up of equipment;Supervision for safety;Verbal cueing   Toileting Stand by Assist   Toileting Description Grab bar;Increased time;Set-up of equipment;Supervision for safety;Verbal cueing   Toilet Transfers Stand by Assist   Toilet Transfer Description Grab bar;Increased time;Set-up of equipment;Supervision for safety;Verbal cueing   Tub / Shower Transfers Contact Guard Assist "   Tub Shower Transfer Description Grab bar;Shower bench;Increased time;Set-up of equipment;Supervision for safety;Verbal cueing   Interdisciplinary Plan of Care Collaboration   IDT Collaboration with  Certified Nursing Assistant;Physical Therapist   Patient Position at End of Therapy Seated;Self Releasing Lap Belt Applied;Call Light within Reach;Tray Table within Reach;Phone within Reach   Collaboration Comments re: CLOF    OT Total Time Spent   OT Individual Total Time Spent (Mins) 90   OT Charge Group   OT Self Care / ADL 6       Assessment    Pt required an entire 90 minutes for shower, dressing and grooming. Pt was disappointed with her pace, but was putting in a lot of effort. Pt has improved with levels of assist for LB Dressing, toileting and grooming.    Strengths: Alert and oriented, Independent prior level of function, Motivated for self care and independence, Pleasant and cooperative, Supportive family, Willingly participates in therapeutic activities  Barriers: Decreased endurance, Difficulty following instructions, Generalized weakness, Impaired activity tolerance, Impaired appetite/intake, Impaired balance, Limited mobility, Pain poorly managed    Plan    pain mgmt, functional mobility, ADLs, iADLs when able    Occupational Therapy Goals     Problem: Bathing     Dates: Start: 02/23/21       Goal: STG-Within one week, patient will bathe     Dates: Start: 02/23/21       Description: 1) Individualized Goal:  with Mod A and AE as needed  2) Interventions:  OT Group Therapy, OT Self Care/ADL, OT Cognitive Skill Dev, OT Community Reintegration, OT Manual Ther Technique, OT Neuro Re-Ed/Balance, OT Therapeutic Activity, OT Evaluation, and OT Therapeutic Exercise                   Problem: Dressing     Dates: Start: 02/23/21       Goal: STG-Within one week, patient will dress LB     Dates: Start: 02/23/21       Description: 1) Individualized Goal:  with Mod A and AE as needed  2) Interventions:  OT Group  Therapy, OT Self Care/ADL, OT Cognitive Skill Dev, OT Community Reintegration, OT Manual Ther Technique, OT Neuro Re-Ed/Balance, OT Therapeutic Activity, OT Evaluation, and OT Therapeutic Exercise                     Problem: OT Long Term Goals     Dates: Start: 02/23/21       Goal: LTG-By discharge, patient will complete basic self care tasks     Dates: Start: 02/23/21       Description: 1) Individualized Goal:  with CGA and AE as needed  2) Interventions:  OT Group Therapy, OT Self Care/ADL, OT Cognitive Skill Dev, OT Community Reintegration, OT Manual Ther Technique, OT Neuro Re-Ed/Balance, OT Therapeutic Activity, OT Evaluation, and OT Therapeutic Exercise               Goal: LTG-By discharge, patient will perform bathroom transfers     Dates: Start: 02/23/21       Description: 1) Individualized Goal:  with CGA and AE as needed  2) Interventions:  OT Group Therapy, OT Self Care/ADL, OT Cognitive Skill Dev, OT Community Reintegration, OT Manual Ther Technique, OT Neuro Re-Ed/Balance, OT Therapeutic Activity, OT Evaluation, and OT Therapeutic Exercise                Problem: Toileting     Dates: Start: 02/23/21       Goal: STG-Within one week, patient will complete toileting tasks     Dates: Start: 02/23/21       Description: 1) Individualized Goal:  with CGA and AE as needed  2) Interventions:  OT Group Therapy, OT Self Care/ADL, OT Cognitive Skill Dev, OT Community Reintegration, OT Manual Ther Technique, OT Neuro Re-Ed/Balance, OT Therapeutic Activity, OT Evaluation, and OT Therapeutic Exercise

## 2021-02-25 NOTE — CARE PLAN
Problem: Bathing  Goal: STG-Within one week, patient will bathe  Description: 1) Individualized Goal:  with Mod A and AE as needed  2) Interventions:  OT Group Therapy, OT Self Care/ADL, OT Cognitive Skill Dev, OT Community Reintegration, OT Manual Ther Technique, OT Neuro Re-Ed/Balance, OT Therapeutic Activity, OT Evaluation, and OT Therapeutic Exercise     Outcome: MET     Problem: Dressing  Goal: STG-Within one week, patient will dress LB  Description: 1) Individualized Goal:  with Mod A and AE as needed  2) Interventions:  OT Group Therapy, OT Self Care/ADL, OT Cognitive Skill Dev, OT Community Reintegration, OT Manual Ther Technique, OT Neuro Re-Ed/Balance, OT Therapeutic Activity, OT Evaluation, and OT Therapeutic Exercise       Outcome: MET     Problem: Toileting  Goal: STG-Within one week, patient will complete toileting tasks  Description: 1) Individualized Goal:  with CGA and AE as needed  2) Interventions:  OT Group Therapy, OT Self Care/ADL, OT Cognitive Skill Dev, OT Community Reintegration, OT Manual Ther Technique, OT Neuro Re-Ed/Balance, OT Therapeutic Activity, OT Evaluation, and OT Therapeutic Exercise       Outcome: MET

## 2021-02-26 ENCOUNTER — HOME HEALTH ADMISSION (OUTPATIENT)
Dept: HOME HEALTH SERVICES | Facility: HOME HEALTHCARE | Age: 81
End: 2021-02-26
Payer: MEDICARE

## 2021-02-26 ENCOUNTER — PATIENT OUTREACH (OUTPATIENT)
Dept: HEALTH INFORMATION MANAGEMENT | Facility: OTHER | Age: 81
End: 2021-02-26

## 2021-02-26 LAB
ANION GAP SERPL CALC-SCNC: 8 MMOL/L (ref 7–16)
BUN SERPL-MCNC: 22 MG/DL (ref 8–22)
CALCIUM SERPL-MCNC: 9.6 MG/DL (ref 8.5–10.5)
CHLORIDE SERPL-SCNC: 99 MMOL/L (ref 96–112)
CO2 SERPL-SCNC: 27 MMOL/L (ref 20–33)
CREAT SERPL-MCNC: 0.66 MG/DL (ref 0.5–1.4)
GLUCOSE SERPL-MCNC: 112 MG/DL (ref 65–99)
POTASSIUM SERPL-SCNC: 3.8 MMOL/L (ref 3.6–5.5)
SARS-COV-2 RNA RESP QL NAA+PROBE: NOTDETECTED
SODIUM SERPL-SCNC: 134 MMOL/L (ref 135–145)
SPECIMEN SOURCE: NORMAL

## 2021-02-26 PROCEDURE — 99232 SBSQ HOSP IP/OBS MODERATE 35: CPT | Performed by: PHYSICAL MEDICINE & REHABILITATION

## 2021-02-26 PROCEDURE — 700102 HCHG RX REV CODE 250 W/ 637 OVERRIDE(OP): Performed by: PHYSICAL MEDICINE & REHABILITATION

## 2021-02-26 PROCEDURE — 80048 BASIC METABOLIC PNL TOTAL CA: CPT

## 2021-02-26 PROCEDURE — U0005 INFEC AGEN DETEC AMPLI PROBE: HCPCS

## 2021-02-26 PROCEDURE — 97116 GAIT TRAINING THERAPY: CPT | Mod: CQ

## 2021-02-26 PROCEDURE — U0003 INFECTIOUS AGENT DETECTION BY NUCLEIC ACID (DNA OR RNA); SEVERE ACUTE RESPIRATORY SYNDROME CORONAVIRUS 2 (SARS-COV-2) (CORONAVIRUS DISEASE [COVID-19]), AMPLIFIED PROBE TECHNIQUE, MAKING USE OF HIGH THROUGHPUT TECHNOLOGIES AS DESCRIBED BY CMS-2020-01-R: HCPCS

## 2021-02-26 PROCEDURE — 97535 SELF CARE MNGMENT TRAINING: CPT

## 2021-02-26 PROCEDURE — 97110 THERAPEUTIC EXERCISES: CPT

## 2021-02-26 PROCEDURE — 97530 THERAPEUTIC ACTIVITIES: CPT | Mod: CQ

## 2021-02-26 PROCEDURE — 770010 HCHG ROOM/CARE - REHAB SEMI PRIVAT*

## 2021-02-26 PROCEDURE — 97110 THERAPEUTIC EXERCISES: CPT | Mod: CQ

## 2021-02-26 PROCEDURE — A9270 NON-COVERED ITEM OR SERVICE: HCPCS | Performed by: PHYSICAL MEDICINE & REHABILITATION

## 2021-02-26 PROCEDURE — 36415 COLL VENOUS BLD VENIPUNCTURE: CPT

## 2021-02-26 PROCEDURE — 700111 HCHG RX REV CODE 636 W/ 250 OVERRIDE (IP): Performed by: PHYSICAL MEDICINE & REHABILITATION

## 2021-02-26 RX ORDER — SULFAMETHOXAZOLE AND TRIMETHOPRIM 800; 160 MG/1; MG/1
1 TABLET ORAL EVERY 12 HOURS
Status: COMPLETED | OUTPATIENT
Start: 2021-02-26 | End: 2021-03-02

## 2021-02-26 RX ADMIN — HYDROCODONE BITARTRATE AND ACETAMINOPHEN 2 TABLET: 5; 325 TABLET ORAL at 03:19

## 2021-02-26 RX ADMIN — SULFAMETHOXAZOLE AND TRIMETHOPRIM 1 TABLET: 800; 160 TABLET ORAL at 12:23

## 2021-02-26 RX ADMIN — SULFAMETHOXAZOLE AND TRIMETHOPRIM 1 TABLET: 800; 160 TABLET ORAL at 21:52

## 2021-02-26 RX ADMIN — SENNOSIDES AND DOCUSATE SODIUM 2 TABLET: 8.6; 5 TABLET ORAL at 09:03

## 2021-02-26 RX ADMIN — HYDROCODONE BITARTRATE AND ACETAMINOPHEN 2 TABLET: 5; 325 TABLET ORAL at 09:02

## 2021-02-26 RX ADMIN — HYDROCODONE BITARTRATE AND ACETAMINOPHEN 2 TABLET: 5; 325 TABLET ORAL at 12:02

## 2021-02-26 RX ADMIN — ESTRADIOL 0.5 MG: 1 TABLET ORAL at 09:04

## 2021-02-26 RX ADMIN — HYDROCODONE BITARTRATE AND ACETAMINOPHEN 2 TABLET: 5; 325 TABLET ORAL at 21:53

## 2021-02-26 RX ADMIN — ENOXAPARIN SODIUM 40 MG: 40 INJECTION SUBCUTANEOUS at 09:03

## 2021-02-26 RX ADMIN — LISINOPRIL 20 MG: 20 TABLET ORAL at 09:03

## 2021-02-26 RX ADMIN — MEDROXYPROGESTERONE ACETATE 2.5 MG: 2.5 TABLET ORAL at 09:03

## 2021-02-26 ASSESSMENT — GAIT ASSESSMENTS
DEVIATION: BRADYKINETIC;DECREASED HEEL STRIKE;DECREASED BASE OF SUPPORT
ASSISTIVE DEVICE: FRONT WHEEL WALKER
GAIT LEVEL OF ASSIST: STAND BY ASSIST

## 2021-02-26 ASSESSMENT — PAIN DESCRIPTION - PAIN TYPE
TYPE: ACUTE PAIN

## 2021-02-26 ASSESSMENT — ACTIVITIES OF DAILY LIVING (ADL)
TOILET_TRANSFER_DESCRIPTION: GRAB BAR;SUPERVISION FOR SAFETY;INCREASED TIME
TOILET_TRANSFER_DESCRIPTION: GRAB BAR;INCREASED TIME;SUPERVISION FOR SAFETY
BED_CHAIR_WHEELCHAIR_TRANSFER_DESCRIPTION: INCREASED TIME;SET-UP OF EQUIPMENT;SUPERVISION FOR SAFETY;VERBAL CUEING
BED_CHAIR_WHEELCHAIR_TRANSFER_DESCRIPTION: INCREASED TIME;ADAPTIVE EQUIPMENT;SUPERVISION FOR SAFETY
TOILETING_LEVEL_OF_ASSIST_DESCRIPTION: GRAB BAR;INCREASED TIME;SUPERVISION FOR SAFETY

## 2021-02-26 NOTE — DISCHARGE PLANNING
Thank you for sending [facility] this referral,  Per our clinical liaison patient is not yet ready to go home so we would like to re-evaluate her condition closer to discharge.  Referral placed on hold until 3/2/21 so we may review her condition at that time.

## 2021-02-26 NOTE — DISCHARGE PLANNING
Confirmed follow up appointments with PCP and Orthopaedics.  Discharge planned for 3/4.  [facility] acceptance pending.

## 2021-02-26 NOTE — DISCHARGE PLANNING
Good morning,  This referral has been escalated to our Clinical Liaison for review in order to determine Home Health appropriateness.  This issue will be resolved as quickly as possible, but for any questions feel free to call us at [phone].  Thank you!

## 2021-02-26 NOTE — DISCHARGE PLANNING
Spoke with patient by phone after Team Conference to relay progress, anticipated discharge date of 3/6/21. She is very happy that she is getting mobility and feels like she has already made progress. She is anxious to go home when appropriate. Choice for HH [facility]. PT recommends FWW. Patient's DCG has a FWW. She will have him bring it in to see if it can be adjusted for her. If not, we will order one for her. Opportunity for questions/discussion provided.

## 2021-02-26 NOTE — PROGRESS NOTES
"Rehab Progress Note     Encounter Date: 2/26/2021    CC: I am doing better    Interval Events (Subjective)  Vital signs stable.  Blood pressure improved since increasing Norvasc.  However, did require hydralazine 2/25.  Patient had dysuria yesterday and urinalysis was collected which appears positive.  Started on Bactrim today.  Patient seen and examined in her room.  She states that she is doing better.  She was aware that she has a UTI and was started on antibiotics.  She did have a very good round of physical therapy and is improving significantly.  The time to walk and equivalent distance has reduced to fold which she is happy about.  Discussed with her possible change of antibiotics depending on culture and sensitivities which patient is agreeable to.  Discussed with her that we will repeat labs on Monday.  Denies any fevers, chills, shortness of breath, chest pain, nausea, vomiting.  Still has residual dysuria but improving.    14 point ROS reviewed and negative except as stated above.     Objective:  VITAL SIGNS: /73   Pulse 72   Temp 36.6 °C (97.8 °F) (Oral)   Resp 16   Ht 1.651 m (5' 5\")   Wt 58.7 kg (129 lb 8 oz)   LMP 01/13/2017 (Exact Date) Comment: Due to hormone therapy to prevent cancer  SpO2 95%   BMI 21.55 kg/m²     GEN: No apparent distress, sitting up in manual wheelchair.  HEENT: Head normocephalic, atraumatic.  Sclera nonicteric bilaterally, no ocular discharge appreciated bilaterally.  CV: Extremities warm and well-perfused, no peripheral edema appreciated bilaterally.  PULMONARY: Breathing nonlabored on room air, no respiratory accessory muscle use.  Not requiring supplemental oxygen.  ABD: Soft, nontender.  SKIN: No appreciable skin breakdown on exposed areas of skin.  NEURO: Awake alert.  Conversational.  Logical thought content.  Moving all extremities volitionally.  PSYCH: Mood and affect within normal limits.      Recent Results (from the past 72 hour(s))   URINALYSIS    " Collection Time: 02/25/21  3:25 PM    Specimen: Urine, Clean Catch   Result Value Ref Range    Color Yellow     Character Cloudy (A)     Specific Gravity 1.024 <1.035    Ph 7.0 5.0 - 8.0    Glucose Negative Negative mg/dL    Ketones Trace (A) Negative mg/dL    Protein 30 (A) Negative mg/dL    Bilirubin Negative Negative    Urobilinogen, Urine 0.2 Negative    Nitrite Positive (A) Negative    Leukocyte Esterase Large (A) Negative    Occult Blood Trace (A) Negative    Micro Urine Req Microscopic    URINE MICROSCOPIC (W/UA)    Collection Time: 02/25/21  3:25 PM   Result Value Ref Range    WBC Packed (A) /hpf    RBC 2-5 (A) /hpf    Bacteria Many (A) None /hpf    Epithelial Cells Few /hpf    Triple Phos Crystal Moderate /hpf   SARS-CoV-2 PCR (24 hour In-House): Collect NP swab in [facility]    Collection Time: 02/26/21  3:30 AM    Specimen: Nasopharyngeal; Respirate   Result Value Ref Range    SARS-CoV-2 Source NP Swab    Basic Metabolic Panel    Collection Time: 02/26/21  6:35 AM   Result Value Ref Range    Sodium 134 (L) 135 - 145 mmol/L    Potassium 3.8 3.6 - 5.5 mmol/L    Chloride 99 96 - 112 mmol/L    Co2 27 20 - 33 mmol/L    Glucose 112 (H) 65 - 99 mg/dL    Bun 22 8 - 22 mg/dL    Creatinine 0.66 0.50 - 1.40 mg/dL    Calcium 9.6 8.5 - 10.5 mg/dL    Anion Gap 8.0 7.0 - 16.0   ESTIMATED GFR    Collection Time: 02/26/21  6:35 AM   Result Value Ref Range    GFR If African American >60 >60 mL/min/1.73 m 2    GFR If Non African American >60 >60 mL/min/1.73 m 2       Current Facility-Administered Medications   Medication Frequency   • lisinopril (PRINIVIL) tablet 20 mg DAILY   • enoxaparin (LOVENOX) inj 40 mg DAILY   • HYDROcodone-acetaminophen (NORCO) 5-325 MG per tablet 1 tablet Q4HRS PRN   • HYDROcodone-acetaminophen (NORCO) 5-325 MG per tablet 2 tablet Q4HRS PRN   • senna-docusate (PERICOLACE or SENOKOT S) 8.6-50 MG per tablet 2 tablet BID    And   • polyethylene glycol/lytes (MIRALAX) PACKET 1 Packet QDAY PRN    And   •  magnesium hydroxide (MILK OF MAGNESIA) suspension 30 mL QDAY PRN    And   • bisacodyl (DULCOLAX) suppository 10 mg QDAY PRN   • acetaminophen (Tylenol) tablet 650 mg Q6HRS PRN   • estradiol (ESTRACE) tablet 0.5 mg DAILY   • medroxyPROGESTERone (PROVERA) tablet 2.5 mg DAILY   • Respiratory Therapy Consult Continuous RT   • Pharmacy Consult Request ...Pain Management Review 1 Each PHARMACY TO DOSE   • hydrALAZINE (APRESOLINE) tablet 25 mg Q8HRS PRN   • artificial tears ophthalmic solution 1 Drop PRN   • benzocaine-menthol (CEPACOL) lozenge 1 Lozenge Q2HRS PRN   • mag hydrox-al hydrox-simeth (MAALOX PLUS ES or MYLANTA DS) suspension 20 mL Q2HRS PRN   • ondansetron (ZOFRAN ODT) dispertab 4 mg 4X/DAY PRN    Or   • ondansetron (ZOFRAN) syringe/vial injection 4 mg 4X/DAY PRN   • traZODone (DESYREL) tablet 50 mg QHS PRN   • sodium chloride (OCEAN) 0.65 % nasal spray 2 Spray PRN       Orders Placed This Encounter   Procedures   • Diet Order Diet: Regular     Standing Status:   Standing     Number of Occurrences:   1     Order Specific Question:   Diet:     Answer:   Regular [1]       Assessment:  Active Hospital Problems    Diagnosis    • *Pelvic fracture (HCC)    • Leukocytosis    • Pelvic hematoma in female    • Postmenopausal    • Opiate dependence (HCC)    • HTN (hypertension)    • Osteoporosis, senile        Medical Decision Making and Plan:  Debility/deconditioning secondary to multiple closed fractures of the pelvis after fall 2/17/2021 (X-rays and CT scan of the pelvis show fractures of the rami on the right side with some mild displacement. There is likely a small anterior sacral fracture is nondisplaced) nonoperative management recommended per Ortho (Dr. Telles).  -Initiate acute inpatient rehabilitation program involving physical therapy and Occupational Therapy  -Weightbearing as tolerated  -Follow-up Ortho 4 weeks (approximately mid March)     Nociceptive pain: Has pain contract with [facility].  Per her  report has prescription for hydrocodone  mg 4 times a day as needed.  However patient states she does not take that much and often cuts them in half.  -Continue Percocet 5 to 10 mg as needed pain every 4 hours--> changed to hydrocodone per patient request given that she is normally on hydrocodone through her pain management group.  -Hydrocodone 5-325 mg for moderate pain, hydrocodone  mg for severe pain.  -Follow-up pain 2/25 -improved with change to hydrocodone.     Osteoarthritis: Followed by [facility] pain. Per her report has prescription for hydrocodone  mg 4 times a day as needed.      Hypertension: Transferred on lisinopril 10 mg.  Had been requiring labetalol IV as needed while in acute.  -Increase to 15 mg for continued elevated systolic 2/23--> increase to 20 mg 2/25.  -2/24 continues to be elevated into 150s intermittently.  Will monitor one more day and increase amlodipine if needed since just increased 2/23.  Required hydralazine evening of 2/24.  Has systolic ranging from mid 130s-150s and even into the 160s on occasion.  Increase as aforementioned 2/25.  -Required hydralazine 2/25, however increased amlodipine 2/25.  Monitor blood pressure.     Postmenopausal: On estradiol + Provera on transfer.  Continue.    Hyponatremia: Mild.  134.  -Encourage p.o. intake.  -Repeat CMP 3/1     Elevated BUN: Encourage fluids.   -Repeat BMP 2/26 improved to 22.  -Repeat BMP 3/1     Leukocytosis: Resolved on admission. + UA 2/25.  -Repeat CBC 3/1     Vitamin D: High at 70.  Hold on supplementation.     Bowel: Senokot 2 tablets twice daily + as needed bowel meds.  -Continue to monitor     Bladder/+ UTI: Pelvic hematoma near bladder.  CT confirmed no bladder involvement.  Had Sandoval removed prior to transfer. Post void 31, 32, 28 cc.   -PVRs low okay to discontinue.  - Dysuria 2/25 holding urine at night. Check UA -positive.  Started Bactrim 2/26 for 5-day course to end 3/1.  -Awaiting culture and  sensitivities.  Will change antibiotics depending.     DVT prophylaxis: Chemoprophylaxis on hold due to pelvic hematoma.  -SCDs  -Contacted surgeon regarding chemoprophylaxis recommendations 2/22, reached out again 2/25 -no reimaging needed given pelvic hematoma, okay to start prophylactic Lovenox.  Started 2/25.  -Repeat CBC 3/1     Skin: Right elbow abrasion.  X-ray negative.     Total time: 26 minutes.  I spent greater than 50% of the time for patient care and coordination on this date, including unit/floor time, and face-to-face time with the patient as per assessment and plan above.    Nohemi Cosby D.O.

## 2021-02-26 NOTE — THERAPY
"Occupational Therapy  Daily Treatment     Patient Name: Nafisa Hancock  Age:  80 y.o., Sex:  female  Medical Record #: [id number]  Today's Date: 2/26/2021     Precautions  Precautions: Fall Risk, Weight Bearing As Tolerated Right Lower Extremity, Weight Bearing As Tolerated Right Upper Extremity  Comments: Antalgic; needs significantly more time to complete transfers         Subjective    \"I'm only able to do this because of all of you. You all are in my head in a good way. I'm learning how to do it.\"    \"the only pain that is bugging me today is the flank pain. I feel fine everywhere else for once.\"     Objective       02/26/21 1301   Functional Level of Assist   Lower Body Dressing Supervision   Lower Body Dressing Description   (socks only; don/doff at EOB)   Bed, Chair, Wheelchair Transfer Stand by Assist   Bed Chair Wheelchair Transfer Description Increased time;Set-up of equipment;Supervision for safety;Verbal cueing   Sitting Upper Body Exercises   Comments Arm circles at 90 degree abduction 15 each direction x2, elbow circles in front of body 2 sets of 20, Pilates arms in front and to side at 90 degrees x15, Pink Theraband HEP: Elbow flex/ext, chest press, shoulder flex/ext to 90. Chair push ups 1 set of 15. Completed 1 set of 15 each movement.    Sitting Lower Body Exercises   Marching Other Resistance (See Comments)  (2 sets of 20)   Sit to Stand 2 sets of 10  (stood and then placed hands on walker )   Interdisciplinary Plan of Care Collaboration   Patient Position at End of Therapy Seated;Self Releasing Lap Belt Applied;Call Light within Reach;Tray Table within Reach;Phone within Reach   OT Total Time Spent   OT Individual Total Time Spent (Mins) 60   OT Charge Group   OT Self Care / ADL 1   OT Therapeutic Exercise  3       Assessment    Pt with calm affect and improved ADLs/mobility. Pt able to don/doff socks at EOB with supervision. Pt tolerated UB exercises, but required shoulder exercises to be " completed no higher than 90 degrees. OT gave pt opportunity to skip shoulder exercises if needed, however, pt wanted to try them. Pt had difficulty teaching back theraband HEP. She may benefit more from hand weights, but she only has a 10 lb hand weight set at home which is too heavy. Pt reports she could purchase another set without issue.    Strengths: Alert and oriented, Independent prior level of function, Motivated for self care and independence, Pleasant and cooperative, Supportive family, Willingly participates in therapeutic activities  Barriers: Decreased endurance, Difficulty following instructions, Generalized weakness, Impaired activity tolerance, Impaired appetite/intake, Impaired balance, Limited mobility, Pain poorly managed    Plan    Continued education on importance of using the toilet instead of brief, pain mgmt, functional mobility, ADLs, iADLs when able       Occupational Therapy Goals     Problem: OT Long Term Goals     Dates: Start: 02/23/21       Goal: LTG-By discharge, patient will complete basic self care tasks     Dates: Start: 02/23/21       Description: 1) Individualized Goal:  with CGA and AE as needed  2) Interventions:  OT Group Therapy, OT Self Care/ADL, OT Cognitive Skill Dev, OT Community Reintegration, OT Manual Ther Technique, OT Neuro Re-Ed/Balance, OT Therapeutic Activity, OT Evaluation, and OT Therapeutic Exercise               Goal: LTG-By discharge, patient will perform bathroom transfers     Dates: Start: 02/23/21       Description: 1) Individualized Goal:  with CGA and AE as needed  2) Interventions:  OT Group Therapy, OT Self Care/ADL, OT Cognitive Skill Dev, OT Community Reintegration, OT Manual Ther Technique, OT Neuro Re-Ed/Balance, OT Therapeutic Activity, OT Evaluation, and OT Therapeutic Exercise

## 2021-02-26 NOTE — CARE PLAN
Problem: Venous Thromboembolism (VTW)/Deep Vein Thrombosis (DVT) Prevention:  Goal: Patient will participate in Venous Thrombosis (VTE)/Deep Vein Thrombosis (DVT)Prevention Measures  Outcome: PROGRESSING AS EXPECTED  Note: Pt starting Lovenox for DVT prophylaxis     Problem: Infection  Goal: Will remain free from infection  Outcome: PROGRESSING SLOWER THAN EXPECTED  Note: UA was positive, C&S added.     Problem: Safety  Goal: Will remain free from injury  Outcome: PROGRESSING AS EXPECTED  Note: Pt uses call light consistently for staff assistance. Pt has good safety awareness, no impulsivity observed.      Problem: Communication  Goal: The ability to communicate needs accurately and effectively will improve  Outcome: PROGRESSING AS EXPECTED  Note: Pt is able to communicate her needs effectively to staff.

## 2021-02-26 NOTE — THERAPY
"Physical Therapy   Daily Treatment     Patient Name: Nafisa Hancock  Age:  80 y.o., Sex:  female  Medical Record #: [id number]  Today's Date: 2/26/2021     Precautions  Precautions: Fall Risk, Weight Bearing As Tolerated Right Lower Extremity, Weight Bearing As Tolerated Right Upper Extremity  Comments: Antalgic; needs significantly more time to complete transfers    Subjective    Pt reports pain in more controlled not that she is on the medication that she took at home     Objective       02/26/21 1001   Pain   Intervention Cold Pack   Pain 0 - 10 Group   Location Pelvis   Location Orientation Posterior;Right   Description Sore   Comfort Goal Perform Activity;Comfort with Movement   Therapist Pain Assessment Nurse Notified;5;6   Gait Functional Level of Assist    Gait Level Of Assist Stand by Assist   Assistive Device Front Wheel Walker   Distance (Feet)   (70' x 1 and 40' x 1 )   # of Times Distance was Traveled 2   Deviation Bradykinetic;Decreased Heel Strike;Decreased Base Of Support   Stairs Functional Level of Assist   Level of Assist with Stairs Unable to Participate   Transfer Functional Level of Assist   Toilet Transfers Supervised   Toilet Transfer Description Grab bar;Supervision for safety;Increased time   Sitting Lower Body Exercises   Sitting Lower Body Exercises Yes   Marching 3 sets of 10;1 set of 10   Hamstring Curl 3 sets of 10;1 set of 10;Light Resistance Theraband   Bed Mobility    Sit to Stand Stand by Assist  (vc for sequencing)   Neuro-Muscular Treatments   Neuro-Muscular Treatments Postural Changes;Weight Shift Left;Weight Shift Right;Anterior weight shift   Comments pre gait standing at FWW toe tapping to 4\" cones, lateral ws in frontal planes SBA   Interdisciplinary Plan of Care Collaboration   Patient Position at End of Therapy Seated;Self Releasing Lap Belt Applied;Call Light within Reach   PT Total Time Spent   PT Individual Total Time Spent (Mins) 60   PT Charge Group   PT Gait " "Training 2   PT Therapeutic Activities 2   Supervising Physical Therapist Marilu Solorzano       Assessment    Pt with improved focus and overall participation this session as she is motivated to get better to go home next week. Pt ambulated 70' today in 4:32\" as compared to yesterday which took over 15 minutes. Metronome and facilitation of forward momentum with walker assisted in increased azul. Pt demos step through and more continuous pattern today  Strengths: Able to follow instructions, Adequate strength, Alert and oriented, Effective communication skills, Good carryover of learning, Good insight into deficits/needs, Independent prior level of function, Motivated for self care and independence, Pleasant and cooperative, Willingly participates in therapeutic activities  Barriers: Decreased endurance, Fatigue, Impaired activity tolerance, Impaired balance, Limited mobility    Plan    Decrease A with bed mobility; FWW safety and usage with transfers and ambulation; ther ex for AROM and strengthening; education for pain management, trial step when patient cleared to leave room.       Physical Therapy Problems     Problem: Mobility     Dates: Start: 02/23/21       Goal: STG-Within one week, patient will ambulate up/down a curb     Dates: Start: 02/23/21       Description: 1) Individualized goal:  CGA with LRAD to ambulate up single step to get into home.   2) Interventions:  PT E Stim Attended, PT Gait Training, PT Self Care/Home Eval, PT Therapeutic Exercises, PT Neuro Re-Ed/Balance, PT Aquatic Therapy, PT Therapeutic Activity, and PT Manual Therapy                  Problem: Mobility Transfers     Dates: Start: 02/23/21       Goal: STG-Within one week, patient will perform bed mobility     Dates: Start: 02/23/21       Description: 1) Individualized goal:  Supervised to demonstrate improved bed mobility skills for independence at home.  2) Interventions:  PT E Stim Attended, PT Gait Training, PT Self Care/Home Eval, " PT Therapeutic Exercises, PT Neuro Re-Ed/Balance, PT Aquatic Therapy, PT Therapeutic Activity, and PT Manual Therapy          Goal: STG-Within one week, patient will sit to stand     Dates: Start: 02/23/21       Description: 1) Individualized goal:  SBA from WC with LRAD.   2) Interventions:  PT E Stim Attended, PT Gait Training, PT Self Care/Home Eval, PT Therapeutic Exercises, PT Neuro Re-Ed/Balance, PT Aquatic Therapy, PT Therapeutic Activity, and PT Manual Therapy                Problem: PT-Long Term Goals     Dates: Start: 02/23/21       Goal: LTG-By discharge, patient will ambulate     Dates: Start: 02/23/21       Description: 1) Individualized goal:  150' supervised to demonstrate improved independence in home.   2) Interventions:  PT E Stim Attended, PT Gait Training, PT Self Care/Home Eval, PT Therapeutic Exercises, PT Neuro Re-Ed/Balance, PT Aquatic Therapy, PT Therapeutic Activity, and PT Manual Therapy          Goal: LTG-By discharge, patient will perform home exercise program     Dates: Start: 02/23/21       Description: 1) Individualized goal:  Independently to demonstrate independence with care at home.   2) Interventions:  PT E Stim Attended, PT Gait Training, PT Self Care/Home Eval, PT Therapeutic Exercises, PT Neuro Re-Ed/Balance, PT Aquatic Therapy, PT Therapeutic Activity, and PT Manual Therapy          Goal: LTG-By discharge, patient will ambulate up/down 4-6 stairs     Dates: Start: 02/23/21       Description: 1) Individualized goal:  Supervised with 1HR to demonstrate improved independence with community ambulation.   2) Interventions:  PT E Stim Attended, PT Gait Training, PT Self Care/Home Eval, PT Therapeutic Exercises, PT Neuro Re-Ed/Balance, PT Aquatic Therapy, PT Therapeutic Activity, and PT Manual Therapy          Goal: LTG-By discharge, patient will transfer in/out of a car     Dates: Start: 02/23/21       Description: 1) Individualized goal:  SBA and minimal verbal cuing for transfer  home.   2) Interventions:  PT E Stim Attended, PT Gait Training, PT Self Care/Home Eval, PT Therapeutic Exercises, PT Neuro Re-Ed/Balance, PT Aquatic Therapy, PT Therapeutic Activity, and PT Manual Therapy

## 2021-02-26 NOTE — THERAPY
"Occupational Therapy  Daily Treatment     Patient Name: Nafisa Hancock  Age:  80 y.o., Sex:  female  Medical Record #: [id number]  Today's Date: 2/26/2021     Precautions  Precautions: Fall Risk, Weight Bearing As Tolerated Right Lower Extremity, Weight Bearing As Tolerated Right Upper Extremity  Comments: Antalgic; needs significantly more time to complete transfers         Subjective    \"Oh, really? I didn't know people could get confused from a UTI. I had one, but it was decades ago and I didn't get confused. Oh no, that's the last thing I need! I guess from now on I will need to use the call light when I need to go to the bathroom.\"     Objective       02/26/21 0931   Cognition    Level of Consciousness Alert   Functional Level of Assist   Eating Modified Independent   Eating Description Increased time   Grooming Supervision  (Distant supervision )   Grooming Description Increased time;Standing at sink   Toileting Supervision  (Distant supervision)   Toileting Description Grab bar;Increased time;Supervision for safety   Toilet Transfers Supervised  (Distant supervision )   Toilet Transfer Description Grab bar;Increased time;Supervision for safety   Balance   Comments ambulated from bathroom to bed at SBA level   Interdisciplinary Plan of Care Collaboration   IDT Collaboration with  Certified Nursing Assistant   Patient Position at End of Therapy Seated;Self Releasing Lap Belt Applied;Tray Table within Reach;Call Light within Reach;Family / Friend in Room   Collaboration Comments Hand off of care from CNA    OT Total Time Spent   OT Individual Total Time Spent (Mins) 30   OT Charge Group   OT Self Care / ADL 2       Assessment    Pt seemed happy with her potential discharge date. Pt had urine in her brief again this morning. It seems like pt is purposed urinating in brief and using the toilet only for bowel movements. OTR educated pt again on importance of using toilet instead of the brief.    Strengths: " Alert and oriented, Independent prior level of function, Motivated for self care and independence, Pleasant and cooperative, Supportive family, Willingly participates in therapeutic activities  Barriers: Decreased endurance, Difficulty following instructions, Generalized weakness, Impaired activity tolerance, Impaired appetite/intake, Impaired balance, Limited mobility, Pain poorly managed    Plan    Continued education on importance of using the toilet instead of brief, pain mgmt, functional mobility, ADLs, iADLs when able    Occupational Therapy Goals     Problem: OT Long Term Goals     Dates: Start: 02/23/21       Goal: LTG-By discharge, patient will complete basic self care tasks     Dates: Start: 02/23/21       Description: 1) Individualized Goal:  with CGA and AE as needed  2) Interventions:  OT Group Therapy, OT Self Care/ADL, OT Cognitive Skill Dev, OT Community Reintegration, OT Manual Ther Technique, OT Neuro Re-Ed/Balance, OT Therapeutic Activity, OT Evaluation, and OT Therapeutic Exercise               Goal: LTG-By discharge, patient will perform bathroom transfers     Dates: Start: 02/23/21       Description: 1) Individualized Goal:  with CGA and AE as needed  2) Interventions:  OT Group Therapy, OT Self Care/ADL, OT Cognitive Skill Dev, OT Community Reintegration, OT Manual Ther Technique, OT Neuro Re-Ed/Balance, OT Therapeutic Activity, OT Evaluation, and OT Therapeutic Exercise

## 2021-02-27 PROCEDURE — 97110 THERAPEUTIC EXERCISES: CPT

## 2021-02-27 PROCEDURE — 700111 HCHG RX REV CODE 636 W/ 250 OVERRIDE (IP): Performed by: PHYSICAL MEDICINE & REHABILITATION

## 2021-02-27 PROCEDURE — A9270 NON-COVERED ITEM OR SERVICE: HCPCS | Performed by: PHYSICAL MEDICINE & REHABILITATION

## 2021-02-27 PROCEDURE — 97116 GAIT TRAINING THERAPY: CPT

## 2021-02-27 PROCEDURE — 97535 SELF CARE MNGMENT TRAINING: CPT

## 2021-02-27 PROCEDURE — 97530 THERAPEUTIC ACTIVITIES: CPT

## 2021-02-27 PROCEDURE — 770010 HCHG ROOM/CARE - REHAB SEMI PRIVAT*

## 2021-02-27 PROCEDURE — 700102 HCHG RX REV CODE 250 W/ 637 OVERRIDE(OP): Performed by: PHYSICAL MEDICINE & REHABILITATION

## 2021-02-27 RX ADMIN — ESTRADIOL 0.5 MG: 1 TABLET ORAL at 08:15

## 2021-02-27 RX ADMIN — SULFAMETHOXAZOLE AND TRIMETHOPRIM 1 TABLET: 800; 160 TABLET ORAL at 20:02

## 2021-02-27 RX ADMIN — SULFAMETHOXAZOLE AND TRIMETHOPRIM 1 TABLET: 800; 160 TABLET ORAL at 08:15

## 2021-02-27 RX ADMIN — HYDROCODONE BITARTRATE AND ACETAMINOPHEN 2 TABLET: 5; 325 TABLET ORAL at 08:15

## 2021-02-27 RX ADMIN — HYDROCODONE BITARTRATE AND ACETAMINOPHEN 2 TABLET: 5; 325 TABLET ORAL at 12:26

## 2021-02-27 RX ADMIN — ENOXAPARIN SODIUM 40 MG: 40 INJECTION SUBCUTANEOUS at 08:14

## 2021-02-27 RX ADMIN — MEDROXYPROGESTERONE ACETATE 2.5 MG: 2.5 TABLET ORAL at 08:16

## 2021-02-27 RX ADMIN — HYDROCODONE BITARTRATE AND ACETAMINOPHEN 2 TABLET: 5; 325 TABLET ORAL at 20:02

## 2021-02-27 RX ADMIN — TRAZODONE HYDROCHLORIDE 50 MG: 50 TABLET ORAL at 20:02

## 2021-02-27 RX ADMIN — LISINOPRIL 20 MG: 20 TABLET ORAL at 08:15

## 2021-02-27 ASSESSMENT — GAIT ASSESSMENTS
GAIT LEVEL OF ASSIST: STAND BY ASSIST
DISTANCE (FEET): 130
ASSISTIVE DEVICE: FRONT WHEEL WALKER
DEVIATION: BRADYKINETIC;STEP TO;DECREASED HEEL STRIKE;DECREASED TOE OFF
GAIT LEVEL OF ASSIST: STAND BY ASSIST
ASSISTIVE DEVICE: FRONT WHEEL WALKER
DEVIATION: BRADYKINETIC;DECREASED HEEL STRIKE;DECREASED BASE OF SUPPORT

## 2021-02-27 ASSESSMENT — PAIN DESCRIPTION - PAIN TYPE
TYPE: ACUTE PAIN

## 2021-02-27 ASSESSMENT — ACTIVITIES OF DAILY LIVING (ADL)
BED_CHAIR_WHEELCHAIR_TRANSFER_DESCRIPTION: INCREASED TIME;SET-UP OF EQUIPMENT;SUPERVISION FOR SAFETY;VERBAL CUEING
TUB_SHOWER_TRANSFER_DESCRIPTION: SHOWER BENCH;SET-UP OF EQUIPMENT;SUPERVISION FOR SAFETY;VERBAL CUEING
TOILET_TRANSFER_DESCRIPTION: ADAPTIVE EQUIPMENT;SET-UP OF EQUIPMENT;SUPERVISION FOR SAFETY;VERBAL CUEING
TOILETING_LEVEL_OF_ASSIST_DESCRIPTION: GRAB BAR;INCREASED TIME;ADAPTIVE EQUIPMENT

## 2021-02-27 NOTE — THERAPY
Physical Therapy   Daily Treatment     Patient Name: Nafisa Hancock  Age:  80 y.o., Sex:  female  Medical Record #: [id number]  Today's Date: 2/26/2021     Precautions  Precautions: Fall Risk, Weight Bearing As Tolerated Right Lower Extremity, Weight Bearing As Tolerated Right Upper Extremity  Comments: Antalgic; needs significantly more time to complete transfers    Subjective    Pt seated in wc upon arrival, agreed to there ex      Objective       02/26/21 1531   Pain   Intervention Heat Applied   Pain 0 - 10 Group   Location Pelvis   Location Orientation Posterior;Right   Comfort Goal Comfort with Movement   Therapist Pain Assessment 6;During Activity  (with transition of sit > supine)   Non Verbal Descriptors   Non Verbal Scale  Tense Body Language;Grimacing   Transfer Functional Level of Assist   Bed, Chair, Wheelchair Transfer Stand by Assist   Bed Chair Wheelchair Transfer Description Increased time;Adaptive equipment;Supervision for safety   Supine Lower Body Exercise   Supine Lower Body Exercises Yes   Bridges Two Legged;2 sets of 10   Straight Leg Raises Front;2 sets of 10;Bilateral   Knee to Chest 2 sets of 10;Bilateral   Ankle Pumps 2 sets of 10   Gluteal Isometrics 2 sets of 10   Quadriceps Isometrics 2 sets of 10;Bilateral   Comments AAROM R LE for SLR and SKC   Bed Mobility    Sit to Supine Moderate Assist  (LE management)   Sit to Stand Stand by Assist   Rolling Minimum Assist to Lt.   Interdisciplinary Plan of Care Collaboration   Patient Position at End of Therapy In Bed;Call Light within Reach   PT Total Time Spent   PT Individual Total Time Spent (Mins) 30   PT Charge Group   PT Therapeutic Exercise 2   Supervising Physical Therapist Marilu Solorzano       Assessment    Pt cont to require SBA for STS transfers and gait training. Mod A for B LE into the bed due to increased pain with transitional movements  Strengths: Able to follow instructions, Adequate strength, Alert and oriented, Effective  communication skills, Good carryover of learning, Good insight into deficits/needs, Independent prior level of function, Motivated for self care and independence, Pleasant and cooperative, Willingly participates in therapeutic activities  Barriers: Decreased endurance, Fatigue, Impaired activity tolerance, Impaired balance, Limited mobility    Plan    Decrease A with bed mobility; FWW safety and usage with transfers and ambulation; ther ex for AROM and strengthening; education for pain management, trial step when patient cleared to leave room.    Physical Therapy Problems     Problem: Mobility     Dates: Start: 02/23/21       Goal: STG-Within one week, patient will ambulate up/down a curb     Dates: Start: 02/23/21       Description: 1) Individualized goal:  CGA with LRAD to ambulate up single step to get into home.   2) Interventions:  PT E Stim Attended, PT Gait Training, PT Self Care/Home Eval, PT Therapeutic Exercises, PT Neuro Re-Ed/Balance, PT Aquatic Therapy, PT Therapeutic Activity, and PT Manual Therapy                  Problem: Mobility Transfers     Dates: Start: 02/23/21       Goal: STG-Within one week, patient will perform bed mobility     Dates: Start: 02/23/21       Description: 1) Individualized goal:  Supervised to demonstrate improved bed mobility skills for independence at home.  2) Interventions:  PT E Stim Attended, PT Gait Training, PT Self Care/Home Eval, PT Therapeutic Exercises, PT Neuro Re-Ed/Balance, PT Aquatic Therapy, PT Therapeutic Activity, and PT Manual Therapy          Goal: STG-Within one week, patient will sit to stand     Dates: Start: 02/23/21       Description: 1) Individualized goal:  SBA from  with LRAD.   2) Interventions:  PT E Stim Attended, PT Gait Training, PT Self Care/Home Eval, PT Therapeutic Exercises, PT Neuro Re-Ed/Balance, PT Aquatic Therapy, PT Therapeutic Activity, and PT Manual Therapy                Problem: PT-Long Term Goals     Dates: Start: 02/23/21        Goal: LTG-By discharge, patient will ambulate     Dates: Start: 02/23/21       Description: 1) Individualized goal:  150' supervised to demonstrate improved independence in home.   2) Interventions:  PT E Stim Attended, PT Gait Training, PT Self Care/Home Eval, PT Therapeutic Exercises, PT Neuro Re-Ed/Balance, PT Aquatic Therapy, PT Therapeutic Activity, and PT Manual Therapy          Goal: LTG-By discharge, patient will perform home exercise program     Dates: Start: 02/23/21       Description: 1) Individualized goal:  Independently to demonstrate independence with care at home.   2) Interventions:  PT E Stim Attended, PT Gait Training, PT Self Care/Home Eval, PT Therapeutic Exercises, PT Neuro Re-Ed/Balance, PT Aquatic Therapy, PT Therapeutic Activity, and PT Manual Therapy          Goal: LTG-By discharge, patient will ambulate up/down 4-6 stairs     Dates: Start: 02/23/21       Description: 1) Individualized goal:  Supervised with 1HR to demonstrate improved independence with community ambulation.   2) Interventions:  PT E Stim Attended, PT Gait Training, PT Self Care/Home Eval, PT Therapeutic Exercises, PT Neuro Re-Ed/Balance, PT Aquatic Therapy, PT Therapeutic Activity, and PT Manual Therapy          Goal: LTG-By discharge, patient will transfer in/out of a car     Dates: Start: 02/23/21       Description: 1) Individualized goal:  SBA and minimal verbal cuing for transfer home.   2) Interventions:  PT E Stim Attended, PT Gait Training, PT Self Care/Home Eval, PT Therapeutic Exercises, PT Neuro Re-Ed/Balance, PT Aquatic Therapy, PT Therapeutic Activity, and PT Manual Therapy

## 2021-02-27 NOTE — CARE PLAN
Problem: Communication  Goal: The ability to communicate needs accurately and effectively will improve  Outcome: PROGRESSING AS EXPECTED     Problem: Safety  Goal: Will remain free from injury  Outcome: PROGRESSING AS EXPECTED     Problem: Infection  Goal: Will remain free from infection  Outcome: PROGRESSING AS EXPECTED  Intervention: Assess signs and symptoms of infection  Note: Afebrile. Continues PO ABT for UTI.     Problem: Bowel/Gastric:  Goal: Normal bowel function is maintained or improved  Outcome: PROGRESSING SLOWER THAN EXPECTED  Intervention: Educate patient and significant other/support system about diet, fluid intake, medications and activity to promote bowel function  Note: Declined scheduled bowel meds at hs, loose stools noted. Pt states, does not want daily bowel meds.

## 2021-02-27 NOTE — CARE PLAN
Problem: Safety  Goal: Will remain free from injury  Outcome: PROGRESSING AS EXPECTED  Goal: Will remain free from falls  Outcome: PROGRESSING AS EXPECTED  Pt education given regarding fall precautions AND safety measures, pt shows good understanding, has not attempted to self transfer this shift, will continue to reinforce education and continue to monitor.

## 2021-02-27 NOTE — PROGRESS NOTES
Received patient during shift change, report rec'd from day shift RN. Resting in bed, VS stable on room air. Per report continent of B&B, mod assist for transfers. A&O x 4, able to make needs known. Bed in low position, call light within reach.

## 2021-02-27 NOTE — THERAPY
Occupational Therapy  Daily Treatment     Patient Name: Nafisa Hancock  Age:  80 y.o., Sex:  female  Medical Record #: [id number]  Today's Date: 2/27/2021     Precautions  Precautions: Fall Risk, Weight Bearing As Tolerated Right Lower Extremity, Weight Bearing As Tolerated Right Upper Extremity  Comments: Antalgic; needs significantly more time to complete transfers         Subjective    Pt tearful when she wasn't able to get RLE over tub lip. Explained to pt that she can ask for help when she is in pain.     Objective       02/27/21 1101   Functional Level of Assist   Tub / Shower Transfers Minimal Assist  (simulated home set up. Tub/Shower w/tub trnfr bench)   Tub Shower Transfer Description Shower bench;Set-up of equipment;Supervision for safety;Verbal cueing   Sitting Upper Body Exercises   Upper Extremity Bike Level 3 Resistance  (motomed: level 3, 10 minutes 1 mile)   Interdisciplinary Plan of Care Collaboration   Patient Position at End of Therapy Seated;Self Releasing Lap Belt Applied;Call Light within Reach;Tray Table within Reach;Phone within Reach   OT Total Time Spent   OT Individual Total Time Spent (Mins) 30   OT Charge Group   OT Self Care / ADL 1   OT Therapeutic Exercise  1       Assessment    Pt motivated to work hard to go home. Pt tearful when she gets frustrated and cusses under her breath at times. Pt was supervision for most of tub/shower transfer, but needed assist for RLE 2/2 pain.    Strengths: Alert and oriented, Independent prior level of function, Motivated for self care and independence, Pleasant and cooperative, Supportive family, Willingly participates in therapeutic activities  Barriers: Decreased endurance, Difficulty following instructions, Generalized weakness, Impaired activity tolerance, Impaired appetite/intake, Impaired balance, Limited mobility, Pain poorly managed    Plan    Continued education on importance of using the toilet instead of brief, pain mgmt, functional  mobility, ADLs at walker level, iADLs     Occupational Therapy Goals     Problem: Bathing     Dates: Start: 02/26/21       Goal: STG-Within one week, patient will bathe     Dates: Start: 02/26/21       Description: 1) Individualized Goal:  with Mod I and AE as needed  2) Interventions:  OT Group Therapy, OT Self Care/ADL, OT Cognitive Skill Dev, OT Community Reintegration, OT Manual Ther Technique, OT Neuro Re-Ed/Balance, OT Therapeutic Activity, OT Evaluation, and OT Therapeutic Exercise                   Problem: Dressing     Dates: Start: 02/26/21       Goal: STG-Within one week, patient will dress UB     Dates: Start: 02/26/21       Description: 1) Individualized Goal:  with Mod I and AE as needed  2) Interventions:  OT Group Therapy, OT Self Care/ADL, OT Cognitive Skill Dev, OT Community Reintegration, OT Manual Ther Technique, OT Neuro Re-Ed/Balance, OT Therapeutic Activity, OT Evaluation, and OT Therapeutic Exercise             Goal: STG-Within one week, patient will dress LB     Dates: Start: 02/26/21       Description: 1) Individualized Goal:  with Mod I and AE as needed  2) Interventions:  OT Group Therapy, OT Self Care/ADL, OT Cognitive Skill Dev, OT Community Reintegration, OT Manual Ther Technique, OT Neuro Re-Ed/Balance, OT Therapeutic Activity, OT Evaluation, and OT Therapeutic Exercise                   Problem: OT Long Term Goals     Dates: Start: 02/23/21       Goal: LTG-By discharge, patient will complete basic self care tasks     Dates: Start: 02/23/21       Description: 1) Individualized Goal:  with CGA and AE as needed  2) Interventions:  OT Group Therapy, OT Self Care/ADL, OT Cognitive Skill Dev, OT Community Reintegration, OT Manual Ther Technique, OT Neuro Re-Ed/Balance, OT Therapeutic Activity, OT Evaluation, and OT Therapeutic Exercise               Goal: LTG-By discharge, patient will perform bathroom transfers     Dates: Start: 02/23/21       Description: 1) Individualized Goal:  with  CGA and AE as needed  2) Interventions:  OT Group Therapy, OT Self Care/ADL, OT Cognitive Skill Dev, OT Community Reintegration, OT Manual Ther Technique, OT Neuro Re-Ed/Balance, OT Therapeutic Activity, OT Evaluation, and OT Therapeutic Exercise                Problem: Toileting     Dates: Start: 02/26/21       Goal: STG-Within one week, patient will complete toileting tasks     Dates: Start: 02/26/21

## 2021-02-27 NOTE — THERAPY
Physical Therapy   Daily Treatment     Patient Name: Nafisa Hancock  Age:  80 y.o., Sex:  female  Medical Record #: [id number]  Today's Date: 2/27/2021     Precautions  Precautions: (P) Fall Risk, Weight Bearing As Tolerated Right Lower Extremity, Weight Bearing As Tolerated Right Upper Extremity  Comments: (P) Antalgic; needs significantly more time to complete transfers    Subjective    Patient reports that heat is helpful but not ice. She was received in bed with two heating pads on right hip. Agreeable to therapy and reports that she remembers what to do but has a difficulty time doing it (during ambulation)     Objective     02/27/21 1031   Precautions   Precautions Fall Risk;Weight Bearing As Tolerated Right Lower Extremity;Weight Bearing As Tolerated Right Upper Extremity   Comments Antalgic; needs significantly more time to complete transfers   Gait Functional Level of Assist    Gait Level Of Assist Stand by Assist   Assistive Device Front Wheel Walker   Distance (Feet)   (100ft)   # of Times Distance was Traveled 1   Deviation Bradykinetic;Decreased Heel Strike;Decreased Base Of Support   Bed Mobility    Supine to Sit Stand by Assist   Scooting Moderate Assist   PT Total Time Spent   PT Individual Total Time Spent (Mins) 30   PT Charge Group   PT Gait Training 1   PT Therapeutic Activities 1     Gait training:  - reviewed normalization of step length, azul and rhythm of gait, progression of walk and positioning. Pt ambulation with shortened step length bilateral, flat foot contact, fwd lean, decreased gait speed and azul. She intermittently can take approximately 4 steps with improved gait quality but is unable to sustain during this session.     Bed mobility:  - supine to long sit in bed use of raised head of bed SBA. Patient attempts unilateral hip scooting and bilateral hip scoot with use of bilateral UE depression on bed but following several minutes of attempts she became frustrated and asked  for assistance. Mod A to scoot to edge of bed.     Assessment    Patient continues to need a significant amount of time to complete functional mobility. Demonstrates fair ability to perform and follow through with gait corrections. She is able to verbalize understanding of gait corrections but reports that hip pain and current clothing is restricting her from being able to follow through.     Strengths: Able to follow instructions, Adequate strength, Alert and oriented, Effective communication skills, Good carryover of learning, Good insight into deficits/needs, Independent prior level of function, Motivated for self care and independence, Pleasant and cooperative, Willingly participates in therapeutic activities  Barriers: Decreased endurance, Fatigue, Impaired activity tolerance, Impaired balance, Limited mobility    Plan    Continue bed mobility training and practice, gait training, LE range of motion and strength.     Physical Therapy Problems     Problem: Mobility     Dates: Start: 02/23/21       Goal: STG-Within one week, patient will ambulate up/down a curb     Dates: Start: 02/23/21       Description: 1) Individualized goal:  CGA with LRAD to ambulate up single step to get into home.   2) Interventions:  PT E Stim Attended, PT Gait Training, PT Self Care/Home Eval, PT Therapeutic Exercises, PT Neuro Re-Ed/Balance, PT Aquatic Therapy, PT Therapeutic Activity, and PT Manual Therapy                  Problem: Mobility Transfers     Dates: Start: 02/23/21       Goal: STG-Within one week, patient will perform bed mobility     Dates: Start: 02/23/21       Description: 1) Individualized goal:  Supervised to demonstrate improved bed mobility skills for independence at home.  2) Interventions:  PT E Stim Attended, PT Gait Training, PT Self Care/Home Eval, PT Therapeutic Exercises, PT Neuro Re-Ed/Balance, PT Aquatic Therapy, PT Therapeutic Activity, and PT Manual Therapy          Goal: STG-Within one week, patient will  sit to stand     Dates: Start: 02/23/21       Description: 1) Individualized goal:  SBA from WC with LRAD.   2) Interventions:  PT E Stim Attended, PT Gait Training, PT Self Care/Home Eval, PT Therapeutic Exercises, PT Neuro Re-Ed/Balance, PT Aquatic Therapy, PT Therapeutic Activity, and PT Manual Therapy                Problem: PT-Long Term Goals     Dates: Start: 02/23/21       Goal: LTG-By discharge, patient will ambulate     Dates: Start: 02/23/21       Description: 1) Individualized goal:  150' supervised to demonstrate improved independence in home.   2) Interventions:  PT E Stim Attended, PT Gait Training, PT Self Care/Home Eval, PT Therapeutic Exercises, PT Neuro Re-Ed/Balance, PT Aquatic Therapy, PT Therapeutic Activity, and PT Manual Therapy          Goal: LTG-By discharge, patient will perform home exercise program     Dates: Start: 02/23/21       Description: 1) Individualized goal:  Independently to demonstrate independence with care at home.   2) Interventions:  PT E Stim Attended, PT Gait Training, PT Self Care/Home Eval, PT Therapeutic Exercises, PT Neuro Re-Ed/Balance, PT Aquatic Therapy, PT Therapeutic Activity, and PT Manual Therapy          Goal: LTG-By discharge, patient will ambulate up/down 4-6 stairs     Dates: Start: 02/23/21       Description: 1) Individualized goal:  Supervised with 1HR to demonstrate improved independence with community ambulation.   2) Interventions:  PT E Stim Attended, PT Gait Training, PT Self Care/Home Eval, PT Therapeutic Exercises, PT Neuro Re-Ed/Balance, PT Aquatic Therapy, PT Therapeutic Activity, and PT Manual Therapy          Goal: LTG-By discharge, patient will transfer in/out of a car     Dates: Start: 02/23/21       Description: 1) Individualized goal:  SBA and minimal verbal cuing for transfer home.   2) Interventions:  PT E Stim Attended, PT Gait Training, PT Self Care/Home Eval, PT Therapeutic Exercises, PT Neuro Re-Ed/Balance, PT Aquatic Therapy, PT  Therapeutic Activity, and PT Manual Therapy

## 2021-02-27 NOTE — THERAPY
"Occupational Therapy  Daily Treatment     Patient Name: Nafisa Hancock  Age:  80 y.o., Sex:  female  Medical Record #: [id number]  Today's Date: 2/27/2021     Precautions  Precautions: Fall Risk, Weight Bearing As Tolerated Right Lower Extremity, Weight Bearing As Tolerated Right Upper Extremity  Comments: Antalgic; needs significantly more time to complete transfers         Subjective    \"Yes, I can help her if she needs anything when she gets home.\" Ryley     Objective       02/27/21 0831   Cognition    Level of Consciousness Alert   Functional Level of Assist   Grooming Modified Independent;Standing   Grooming Description Increased time   Upper Body Dressing Modified Independent   Upper Body Dressing Description Increased time   Lower Body Dressing Supervision  (distant supervision)   Lower Body Dressing Description Increased time;Supervision for safety  (FWW)   Toileting Modified Independent   Toileting Description Grab bar;Increased time;Adaptive equipment  (FWW)   Bed, Chair, Wheelchair Transfer Minimal Assist   Bed Chair Wheelchair Transfer Description Increased time;Set-up of equipment;Supervision for safety;Verbal cueing   Toilet Transfers Supervised   Toilet Transfer Description Adaptive equipment;Set-up of equipment;Supervision for safety;Verbal cueing  (supervision to walk to bathroom, mod I once in bathroom)   Bed Mobility    Supine to Sit Minimal Assist   Scooting Moderate Assist   Interdisciplinary Plan of Care Collaboration   IDT Collaboration with  Nursing;Family / Caregiver   Patient Position at End of Therapy Seated;Self Releasing Lap Belt Applied;Call Light within Reach;Tray Table within Reach;Phone within Reach;Family / Friend in Room   Collaboration Comments Re: CLOF, meds and precautions   OT Total Time Spent   OT Individual Total Time Spent (Mins) 60   OT Charge Group   OT Self Care / ADL 3   OT Therapy Activity 1     OTR requesting room change for pt now that she is off isolation. Charge " RN stated she would move from 32 to 21.    Assessment    Pt continues to improve with ADLs. Nearing Mod I for ADLs, but still requires supervision for ambulation.    Strengths: Alert and oriented, Independent prior level of function, Motivated for self care and independence, Pleasant and cooperative, Supportive family, Willingly participates in therapeutic activities  Barriers: Decreased endurance, Difficulty following instructions, Generalized weakness, Impaired activity tolerance, Impaired appetite/intake, Impaired balance, Limited mobility, Pain poorly managed    Plan    Continued education on importance of using the toilet instead of brief, pain mgmt, functional mobility, ADLs at walker level, iADLs     Occupational Therapy Goals     Problem: Bathing     Dates: Start: 02/26/21       Goal: STG-Within one week, patient will bathe     Dates: Start: 02/26/21       Description: 1) Individualized Goal:  with Mod I and AE as needed  2) Interventions:  OT Group Therapy, OT Self Care/ADL, OT Cognitive Skill Dev, OT Community Reintegration, OT Manual Ther Technique, OT Neuro Re-Ed/Balance, OT Therapeutic Activity, OT Evaluation, and OT Therapeutic Exercise                   Problem: Dressing     Dates: Start: 02/26/21       Goal: STG-Within one week, patient will dress UB     Dates: Start: 02/26/21       Description: 1) Individualized Goal:  with Mod I and AE as needed  2) Interventions:  OT Group Therapy, OT Self Care/ADL, OT Cognitive Skill Dev, OT Community Reintegration, OT Manual Ther Technique, OT Neuro Re-Ed/Balance, OT Therapeutic Activity, OT Evaluation, and OT Therapeutic Exercise             Goal: STG-Within one week, patient will dress LB     Dates: Start: 02/26/21       Description: 1) Individualized Goal:  with Mod I and AE as needed  2) Interventions:  OT Group Therapy, OT Self Care/ADL, OT Cognitive Skill Dev, OT Community Reintegration, OT Manual Ther Technique, OT Neuro Re-Ed/Balance, OT Therapeutic  Activity, OT Evaluation, and OT Therapeutic Exercise                   Problem: OT Long Term Goals     Dates: Start: 02/23/21       Goal: LTG-By discharge, patient will complete basic self care tasks     Dates: Start: 02/23/21       Description: 1) Individualized Goal:  with CGA and AE as needed  2) Interventions:  OT Group Therapy, OT Self Care/ADL, OT Cognitive Skill Dev, OT Community Reintegration, OT Manual Ther Technique, OT Neuro Re-Ed/Balance, OT Therapeutic Activity, OT Evaluation, and OT Therapeutic Exercise               Goal: LTG-By discharge, patient will perform bathroom transfers     Dates: Start: 02/23/21       Description: 1) Individualized Goal:  with CGA and AE as needed  2) Interventions:  OT Group Therapy, OT Self Care/ADL, OT Cognitive Skill Dev, OT Community Reintegration, OT Manual Ther Technique, OT Neuro Re-Ed/Balance, OT Therapeutic Activity, OT Evaluation, and OT Therapeutic Exercise                Problem: Toileting     Dates: Start: 02/26/21       Goal: STG-Within one week, patient will complete toileting tasks     Dates: Start: 02/26/21

## 2021-02-28 LAB
BACTERIA UR CULT: ABNORMAL
BACTERIA UR CULT: ABNORMAL
SIGNIFICANT IND 70042: ABNORMAL
SITE SITE: ABNORMAL
SOURCE SOURCE: ABNORMAL

## 2021-02-28 PROCEDURE — 770010 HCHG ROOM/CARE - REHAB SEMI PRIVAT*

## 2021-02-28 PROCEDURE — A9270 NON-COVERED ITEM OR SERVICE: HCPCS | Performed by: PHYSICAL MEDICINE & REHABILITATION

## 2021-02-28 PROCEDURE — 700102 HCHG RX REV CODE 250 W/ 637 OVERRIDE(OP): Performed by: PHYSICAL MEDICINE & REHABILITATION

## 2021-02-28 PROCEDURE — 700111 HCHG RX REV CODE 636 W/ 250 OVERRIDE (IP): Performed by: PHYSICAL MEDICINE & REHABILITATION

## 2021-02-28 RX ADMIN — HYDROCODONE BITARTRATE AND ACETAMINOPHEN 2 TABLET: 5; 325 TABLET ORAL at 20:07

## 2021-02-28 RX ADMIN — LISINOPRIL 20 MG: 20 TABLET ORAL at 09:03

## 2021-02-28 RX ADMIN — HYDROCODONE BITARTRATE AND ACETAMINOPHEN 2 TABLET: 5; 325 TABLET ORAL at 03:29

## 2021-02-28 RX ADMIN — ENOXAPARIN SODIUM 40 MG: 40 INJECTION SUBCUTANEOUS at 09:02

## 2021-02-28 RX ADMIN — ESTRADIOL 0.5 MG: 1 TABLET ORAL at 09:03

## 2021-02-28 RX ADMIN — SULFAMETHOXAZOLE AND TRIMETHOPRIM 1 TABLET: 800; 160 TABLET ORAL at 09:03

## 2021-02-28 RX ADMIN — HYDROCODONE BITARTRATE AND ACETAMINOPHEN 2 TABLET: 5; 325 TABLET ORAL at 09:03

## 2021-02-28 RX ADMIN — TRAZODONE HYDROCHLORIDE 50 MG: 50 TABLET ORAL at 20:06

## 2021-02-28 RX ADMIN — SULFAMETHOXAZOLE AND TRIMETHOPRIM 1 TABLET: 800; 160 TABLET ORAL at 20:07

## 2021-02-28 RX ADMIN — MEDROXYPROGESTERONE ACETATE 2.5 MG: 2.5 TABLET ORAL at 09:03

## 2021-02-28 RX ADMIN — HYDROCODONE BITARTRATE AND ACETAMINOPHEN 2 TABLET: 5; 325 TABLET ORAL at 15:17

## 2021-02-28 RX ADMIN — SENNOSIDES AND DOCUSATE SODIUM 2 TABLET: 8.6; 5 TABLET ORAL at 09:03

## 2021-02-28 ASSESSMENT — FIBROSIS 4 INDEX: FIB4 SCORE: 1.376494403223370595

## 2021-02-28 NOTE — THERAPY
Physical Therapy   Daily Treatment     Patient Name: Nafisa Hancock  Age:  80 y.o., Sex:  female  Medical Record #: [id number]  Today's Date: 2/27/2021     Precautions  Precautions: (P) Fall Risk, Weight Bearing As Tolerated Right Lower Extremity, Weight Bearing As Tolerated Right Upper Extremity  Comments: Antalgic; needs significantly more time to complete transfers    Subjective    Pt agreeable to therapy. Reports that she is in a lot of pain but knows that she needs to do this.      Objective     02/27/21 1430   Precautions   Precautions Fall Risk;Weight Bearing As Tolerated Right Lower Extremity;Weight Bearing As Tolerated Right Upper Extremity   Gait Functional Level of Assist    Gait Level Of Assist Stand by Assist   Assistive Device Front Wheel Walker   Distance (Feet) 130   # of Times Distance was Traveled 2   Deviation Bradykinetic;Step To;Decreased Heel Strike;Decreased Toe Off   Transfer Functional Level of Assist   Bed, Chair, Wheelchair Transfer Stand by Assist   Sitting Lower Body Exercises   Sitting Lower Body Exercises Yes   Hip Adduction 2 sets of 10;Bilateral  (10' isometric)   Long Arc Quad 2 sets of 10;Bilateral   Marching 2 sets of 10   Hamstring Curl 2 sets of 10   Nustep Resistance Level 2  (10 min; 49SPM average)   Bed Mobility    Sit to Supine Moderate Assist   Sit to Stand Stand by Assist   PT Total Time Spent   PT Individual Total Time Spent (Mins) 30   PT Charge Group   PT Gait Training 2   PT Therapeutic Exercise 2     Gait training:  - continued verbal cues and education on normalizing gait.     Assessment    Patient demonstrates improved walking azul and speed this session. She was able to decrease UE weight bearing and perform continuous progression of walker. She did well on the nustep but had some increased pain following.     Strengths: Able to follow instructions, Adequate strength, Alert and oriented, Effective communication skills, Good carryover of learning, Good  insight into deficits/needs, Independent prior level of function, Motivated for self care and independence, Pleasant and cooperative, Willingly participates in therapeutic activities  Barriers: Decreased endurance, Fatigue, Impaired activity tolerance, Impaired balance, Limited mobility    Plan    Continue ROM, LE strength, gait training, balance.     Physical Therapy Problems     Problem: Mobility     Dates: Start: 02/23/21       Goal: STG-Within one week, patient will ambulate up/down a curb     Dates: Start: 02/23/21       Description: 1) Individualized goal:  CGA with LRAD to ambulate up single step to get into home.   2) Interventions:  PT E Stim Attended, PT Gait Training, PT Self Care/Home Eval, PT Therapeutic Exercises, PT Neuro Re-Ed/Balance, PT Aquatic Therapy, PT Therapeutic Activity, and PT Manual Therapy                  Problem: Mobility Transfers     Dates: Start: 02/23/21       Goal: STG-Within one week, patient will perform bed mobility     Dates: Start: 02/23/21       Description: 1) Individualized goal:  Supervised to demonstrate improved bed mobility skills for independence at home.  2) Interventions:  PT E Stim Attended, PT Gait Training, PT Self Care/Home Eval, PT Therapeutic Exercises, PT Neuro Re-Ed/Balance, PT Aquatic Therapy, PT Therapeutic Activity, and PT Manual Therapy          Goal: STG-Within one week, patient will sit to stand     Dates: Start: 02/23/21       Description: 1) Individualized goal:  SBA from  with LRAD.   2) Interventions:  PT E Stim Attended, PT Gait Training, PT Self Care/Home Eval, PT Therapeutic Exercises, PT Neuro Re-Ed/Balance, PT Aquatic Therapy, PT Therapeutic Activity, and PT Manual Therapy                Problem: PT-Long Term Goals     Dates: Start: 02/23/21       Goal: LTG-By discharge, patient will ambulate     Dates: Start: 02/23/21       Description: 1) Individualized goal:  150' supervised to demonstrate improved independence in home.   2) Interventions:   PT E Stim Attended, PT Gait Training, PT Self Care/Home Eval, PT Therapeutic Exercises, PT Neuro Re-Ed/Balance, PT Aquatic Therapy, PT Therapeutic Activity, and PT Manual Therapy          Goal: LTG-By discharge, patient will perform home exercise program     Dates: Start: 02/23/21       Description: 1) Individualized goal:  Independently to demonstrate independence with care at home.   2) Interventions:  PT E Stim Attended, PT Gait Training, PT Self Care/Home Eval, PT Therapeutic Exercises, PT Neuro Re-Ed/Balance, PT Aquatic Therapy, PT Therapeutic Activity, and PT Manual Therapy          Goal: LTG-By discharge, patient will ambulate up/down 4-6 stairs     Dates: Start: 02/23/21       Description: 1) Individualized goal:  Supervised with 1HR to demonstrate improved independence with community ambulation.   2) Interventions:  PT E Stim Attended, PT Gait Training, PT Self Care/Home Eval, PT Therapeutic Exercises, PT Neuro Re-Ed/Balance, PT Aquatic Therapy, PT Therapeutic Activity, and PT Manual Therapy          Goal: LTG-By discharge, patient will transfer in/out of a car     Dates: Start: 02/23/21       Description: 1) Individualized goal:  SBA and minimal verbal cuing for transfer home.   2) Interventions:  PT E Stim Attended, PT Gait Training, PT Self Care/Home Eval, PT Therapeutic Exercises, PT Neuro Re-Ed/Balance, PT Aquatic Therapy, PT Therapeutic Activity, and PT Manual Therapy

## 2021-03-01 LAB
ANION GAP SERPL CALC-SCNC: 12 MMOL/L (ref 7–16)
BASOPHILS # BLD AUTO: 0.8 % (ref 0–1.8)
BASOPHILS # BLD: 0.05 K/UL (ref 0–0.12)
BUN SERPL-MCNC: 18 MG/DL (ref 8–22)
CALCIUM SERPL-MCNC: 9.1 MG/DL (ref 8.5–10.5)
CHLORIDE SERPL-SCNC: 102 MMOL/L (ref 96–112)
CO2 SERPL-SCNC: 23 MMOL/L (ref 20–33)
CREAT SERPL-MCNC: 0.73 MG/DL (ref 0.5–1.4)
EOSINOPHIL # BLD AUTO: 0.12 K/UL (ref 0–0.51)
EOSINOPHIL NFR BLD: 1.8 % (ref 0–6.9)
ERYTHROCYTE [DISTWIDTH] IN BLOOD BY AUTOMATED COUNT: 47.3 FL (ref 35.9–50)
GLUCOSE SERPL-MCNC: 94 MG/DL (ref 65–99)
HCT VFR BLD AUTO: 39.2 % (ref 37–47)
HGB BLD-MCNC: 12.6 G/DL (ref 12–16)
IMM GRANULOCYTES # BLD AUTO: 0.02 K/UL (ref 0–0.11)
IMM GRANULOCYTES NFR BLD AUTO: 0.3 % (ref 0–0.9)
LYMPHOCYTES # BLD AUTO: 1.81 K/UL (ref 1–4.8)
LYMPHOCYTES NFR BLD: 27.7 % (ref 22–41)
MCH RBC QN AUTO: 30.3 PG (ref 27–33)
MCHC RBC AUTO-ENTMCNC: 32.1 G/DL (ref 33.6–35)
MCV RBC AUTO: 94.2 FL (ref 81.4–97.8)
MONOCYTES # BLD AUTO: 0.49 K/UL (ref 0–0.85)
MONOCYTES NFR BLD AUTO: 7.5 % (ref 0–13.4)
NEUTROPHILS # BLD AUTO: 4.05 K/UL (ref 2–7.15)
NEUTROPHILS NFR BLD: 61.9 % (ref 44–72)
NRBC # BLD AUTO: 0 K/UL
NRBC BLD-RTO: 0 /100 WBC
PLATELET # BLD AUTO: 400 K/UL (ref 164–446)
PMV BLD AUTO: 10.3 FL (ref 9–12.9)
POTASSIUM SERPL-SCNC: 3.8 MMOL/L (ref 3.6–5.5)
RBC # BLD AUTO: 4.16 M/UL (ref 4.2–5.4)
SODIUM SERPL-SCNC: 137 MMOL/L (ref 135–145)
WBC # BLD AUTO: 6.5 K/UL (ref 4.8–10.8)

## 2021-03-01 PROCEDURE — 770010 HCHG ROOM/CARE - REHAB SEMI PRIVAT*

## 2021-03-01 PROCEDURE — 85025 COMPLETE CBC W/AUTO DIFF WBC: CPT

## 2021-03-01 PROCEDURE — 97110 THERAPEUTIC EXERCISES: CPT

## 2021-03-01 PROCEDURE — 97530 THERAPEUTIC ACTIVITIES: CPT

## 2021-03-01 PROCEDURE — 80048 BASIC METABOLIC PNL TOTAL CA: CPT

## 2021-03-01 PROCEDURE — A9270 NON-COVERED ITEM OR SERVICE: HCPCS | Performed by: PHYSICAL MEDICINE & REHABILITATION

## 2021-03-01 PROCEDURE — 700102 HCHG RX REV CODE 250 W/ 637 OVERRIDE(OP): Performed by: PHYSICAL MEDICINE & REHABILITATION

## 2021-03-01 PROCEDURE — 99232 SBSQ HOSP IP/OBS MODERATE 35: CPT | Performed by: PHYSICAL MEDICINE & REHABILITATION

## 2021-03-01 PROCEDURE — 36415 COLL VENOUS BLD VENIPUNCTURE: CPT

## 2021-03-01 PROCEDURE — 97535 SELF CARE MNGMENT TRAINING: CPT

## 2021-03-01 PROCEDURE — 97116 GAIT TRAINING THERAPY: CPT

## 2021-03-01 PROCEDURE — 700111 HCHG RX REV CODE 636 W/ 250 OVERRIDE (IP): Performed by: PHYSICAL MEDICINE & REHABILITATION

## 2021-03-01 RX ADMIN — HYDROCODONE BITARTRATE AND ACETAMINOPHEN 2 TABLET: 5; 325 TABLET ORAL at 01:31

## 2021-03-01 RX ADMIN — HYDROCODONE BITARTRATE AND ACETAMINOPHEN 2 TABLET: 5; 325 TABLET ORAL at 11:58

## 2021-03-01 RX ADMIN — SENNOSIDES AND DOCUSATE SODIUM 2 TABLET: 8.6; 5 TABLET ORAL at 09:12

## 2021-03-01 RX ADMIN — MEDROXYPROGESTERONE ACETATE 2.5 MG: 2.5 TABLET ORAL at 09:12

## 2021-03-01 RX ADMIN — TRAZODONE HYDROCHLORIDE 50 MG: 50 TABLET ORAL at 20:04

## 2021-03-01 RX ADMIN — LISINOPRIL 30 MG: 20 TABLET ORAL at 09:12

## 2021-03-01 RX ADMIN — HYDROCODONE BITARTRATE AND ACETAMINOPHEN 2 TABLET: 5; 325 TABLET ORAL at 06:21

## 2021-03-01 RX ADMIN — HYDROCODONE BITARTRATE AND ACETAMINOPHEN 2 TABLET: 5; 325 TABLET ORAL at 16:30

## 2021-03-01 RX ADMIN — HYDROCODONE BITARTRATE AND ACETAMINOPHEN 2 TABLET: 5; 325 TABLET ORAL at 20:00

## 2021-03-01 RX ADMIN — ESTRADIOL 0.5 MG: 1 TABLET ORAL at 09:12

## 2021-03-01 RX ADMIN — ENOXAPARIN SODIUM 40 MG: 40 INJECTION SUBCUTANEOUS at 09:12

## 2021-03-01 RX ADMIN — SULFAMETHOXAZOLE AND TRIMETHOPRIM 1 TABLET: 800; 160 TABLET ORAL at 09:12

## 2021-03-01 RX ADMIN — SENNOSIDES AND DOCUSATE SODIUM 2 TABLET: 8.6; 5 TABLET ORAL at 20:00

## 2021-03-01 RX ADMIN — SULFAMETHOXAZOLE AND TRIMETHOPRIM 1 TABLET: 800; 160 TABLET ORAL at 20:00

## 2021-03-01 ASSESSMENT — ACTIVITIES OF DAILY LIVING (ADL)
BED_CHAIR_WHEELCHAIR_TRANSFER_DESCRIPTION: INCREASED TIME;SET-UP OF EQUIPMENT;ASSIST WITH TWO LIMBS
TOILETING_LEVEL_OF_ASSIST_DESCRIPTION: GRAB BAR;INCREASED TIME;ADAPTIVE EQUIPMENT
TUB_SHOWER_TRANSFER_DESCRIPTION: ADAPTIVE EQUIPMENT;GRAB BAR;SHOWER BENCH;SUPERVISION FOR SAFETY
TOILET_TRANSFER_DESCRIPTION: INCREASED TIME;GRAB BAR;ADAPTIVE EQUIPMENT

## 2021-03-01 ASSESSMENT — GAIT ASSESSMENTS
ASSISTIVE DEVICE: FRONT WHEEL WALKER
DEVIATION: BRADYKINETIC;ANTALGIC;DECREASED BASE OF SUPPORT
GAIT LEVEL OF ASSIST: STAND BY ASSIST

## 2021-03-01 NOTE — DISCHARGE PLANNING
"Met w/ patient at bedside. Reviewed DC planning. States \"Ryley's walker fits me great so I don't need one ordered for me.\" Discussed family training & HH referral. Reviewed HH choice form: [facility] provider. Questions answered. Emotional support provided.  "

## 2021-03-01 NOTE — PROGRESS NOTES
Boost supplements sitting at patients bedside this writer educated the necessity to drink them - patient reports they give her diarrhea.

## 2021-03-01 NOTE — THERAPY
Physical Therapy   Daily Treatment     Patient Name: Nafisa Hancock  Age:  80 y.o., Sex:  female  Medical Record #: [id number]  Today's Date: 3/1/2021     Precautions  Precautions: Fall Risk, Weight Bearing As Tolerated Right Lower Extremity, Weight Bearing As Tolerated Right Upper Extremity  Comments: Antalgic; needs significantly more time to complete transfers    Subjective    Pt seated in room, agreeable to PT     Objective       03/01/21 1031   Precautions   Precautions Fall Risk;Weight Bearing As Tolerated Right Lower Extremity;Weight Bearing As Tolerated Right Upper Extremity   Gait Functional Level of Assist    Gait Level Of Assist Stand by Assist   Assistive Device Front Wheel Walker   Distance (Feet)   (215 ft and 95 ft)   Deviation Bradykinetic;Antalgic;Decreased Base Of Support   Sitting Lower Body Exercises   Ankle Pumps 2 sets of 15   Hip Abduction 2 sets of 15   Hip Adduction 2 sets of 15   Long Arc Quad 2 sets of 15   Marching 2 sets of 15   Hamstring Curl 2 sets of 15   Comments Above exercises completed seated in WC with pink tband for resistance   Interdisciplinary Plan of Care Collaboration   Patient Position at End of Therapy Seated;Call Light within Reach;Tray Table within Reach   PT Total Time Spent   PT Individual Total Time Spent (Mins) 30   PT Charge Group   PT Gait Training 1   PT Therapeutic Exercise 2   PT Therapeutic Activities 1     Pt education/ discussion regarding CLOF, POC, and DC plan. Also discussed gait mechanics and compensations secondary to pain.    Assessment    Pt required extended rest breaks and very bradykinetic with gait. Pt reported decreased antalgia compared to previous sessions.     Strengths: Able to follow instructions, Adequate strength, Alert and oriented, Effective communication skills, Good carryover of learning, Good insight into deficits/needs, Independent prior level of function, Motivated for self care and independence, Pleasant and cooperative,  Willingly participates in therapeutic activities  Barriers: Decreased endurance, Fatigue, Impaired activity tolerance, Impaired balance, Limited mobility    Plan    Continue LE strength and endurance, standing tolerance and balance, gait with FWW.     Physical Therapy Problems     Problem: Mobility     Dates: Start: 02/23/21       Goal: STG-Within one week, patient will ambulate up/down a curb     Dates: Start: 02/23/21       Description: 1) Individualized goal:  CGA with LRAD to ambulate up single step to get into home.   2) Interventions:  PT E Stim Attended, PT Gait Training, PT Self Care/Home Eval, PT Therapeutic Exercises, PT Neuro Re-Ed/Balance, PT Aquatic Therapy, PT Therapeutic Activity, and PT Manual Therapy                  Problem: Mobility Transfers     Dates: Start: 02/23/21       Goal: STG-Within one week, patient will perform bed mobility     Dates: Start: 02/23/21       Description: 1) Individualized goal:  Supervised to demonstrate improved bed mobility skills for independence at home.  2) Interventions:  PT E Stim Attended, PT Gait Training, PT Self Care/Home Eval, PT Therapeutic Exercises, PT Neuro Re-Ed/Balance, PT Aquatic Therapy, PT Therapeutic Activity, and PT Manual Therapy          Goal: STG-Within one week, patient will sit to stand     Dates: Start: 02/23/21       Description: 1) Individualized goal:  SBA from  with LRAD.   2) Interventions:  PT E Stim Attended, PT Gait Training, PT Self Care/Home Eval, PT Therapeutic Exercises, PT Neuro Re-Ed/Balance, PT Aquatic Therapy, PT Therapeutic Activity, and PT Manual Therapy                Problem: PT-Long Term Goals     Dates: Start: 02/23/21       Goal: LTG-By discharge, patient will ambulate     Dates: Start: 02/23/21       Description: 1) Individualized goal:  150' supervised to demonstrate improved independence in home.   2) Interventions:  PT E Stim Attended, PT Gait Training, PT Self Care/Home Eval, PT Therapeutic Exercises, PT Neuro  Re-Ed/Balance, PT Aquatic Therapy, PT Therapeutic Activity, and PT Manual Therapy          Goal: LTG-By discharge, patient will perform home exercise program     Dates: Start: 02/23/21       Description: 1) Individualized goal:  Independently to demonstrate independence with care at home.   2) Interventions:  PT E Stim Attended, PT Gait Training, PT Self Care/Home Eval, PT Therapeutic Exercises, PT Neuro Re-Ed/Balance, PT Aquatic Therapy, PT Therapeutic Activity, and PT Manual Therapy          Goal: LTG-By discharge, patient will ambulate up/down 4-6 stairs     Dates: Start: 02/23/21       Description: 1) Individualized goal:  Supervised with 1HR to demonstrate improved independence with community ambulation.   2) Interventions:  PT E Stim Attended, PT Gait Training, PT Self Care/Home Eval, PT Therapeutic Exercises, PT Neuro Re-Ed/Balance, PT Aquatic Therapy, PT Therapeutic Activity, and PT Manual Therapy          Goal: LTG-By discharge, patient will transfer in/out of a car     Dates: Start: 02/23/21       Description: 1) Individualized goal:  SBA and minimal verbal cuing for transfer home.   2) Interventions:  PT E Stim Attended, PT Gait Training, PT Self Care/Home Eval, PT Therapeutic Exercises, PT Neuro Re-Ed/Balance, PT Aquatic Therapy, PT Therapeutic Activity, and PT Manual Therapy

## 2021-03-01 NOTE — CARE PLAN
Problem: Safety  Goal: Will remain free from falls  Outcome: PROGRESSING AS EXPECTED     Problem: Bowel/Gastric:  Goal: Normal bowel function is maintained or improved  Outcome: PROGRESSING AS EXPECTED  Note: Patient refused bowel medications this evening reports that stool is very soft.

## 2021-03-01 NOTE — PROGRESS NOTES
"Rehab Progress Note     Chief Complaint: Pelvic fracture    Interval Events (Subjective)  Assuming care today.  Chart reviewed.  Patient examined, patient is doing well.  Good appetite, hip pain improved, discussed stools, labs reviewed.  No new complaints    Objective:  VITAL SIGNS: /74   Pulse 90   Temp 36.6 °C (97.9 °F) (Oral)   Resp 18   Ht 1.651 m (5' 5\")   Wt 62 kg (136 lb 11 oz)   LMP 01/13/2017 (Exact Date) Comment: Due to hormone therapy to prevent cancer  SpO2 96%   BMI 22.75 kg/m²     Physical Exam:  Vitals: /74   Pulse 90   Temp 36.6 °C (97.9 °F) (Oral)   Resp 18   Ht 1.651 m (5' 5\")   Wt 62 kg (136 lb 11 oz)   SpO2 96%   Gen: NAD  Head: NC/AT  Eyes/ Nose/ Mouth: PERRLA, moist mucous membranes  Cardio: RRR, no mumurs  Pulm: CTAB, with normal respiratory effort  Abd: Soft NTND, active bowel sounds,   Ext: No peripheral edema. No calf tenderness. No clubbing/cyanosis.       Tone: no spasticity noted, no cogwheeling noted    Recent Results (from the past 72 hour(s))   Basic Metabolic Panel    Collection Time: 03/01/21  6:15 AM   Result Value Ref Range    Sodium 137 135 - 145 mmol/L    Potassium 3.8 3.6 - 5.5 mmol/L    Chloride 102 96 - 112 mmol/L    Co2 23 20 - 33 mmol/L    Glucose 94 65 - 99 mg/dL    Bun 18 8 - 22 mg/dL    Creatinine 0.73 0.50 - 1.40 mg/dL    Calcium 9.1 8.5 - 10.5 mg/dL    Anion Gap 12.0 7.0 - 16.0   CBC WITH DIFFERENTIAL    Collection Time: 03/01/21  6:15 AM   Result Value Ref Range    WBC 6.5 4.8 - 10.8 K/uL    RBC 4.16 (L) 4.20 - 5.40 M/uL    Hemoglobin 12.6 12.0 - 16.0 g/dL    Hematocrit 39.2 37.0 - 47.0 %    MCV 94.2 81.4 - 97.8 fL    MCH 30.3 27.0 - 33.0 pg    MCHC 32.1 (L) 33.6 - 35.0 g/dL    RDW 47.3 35.9 - 50.0 fL    Platelet Count 400 164 - 446 K/uL    MPV 10.3 9.0 - 12.9 fL    Neutrophils-Polys 61.90 44.00 - 72.00 %    Lymphocytes 27.70 22.00 - 41.00 %    Monocytes 7.50 0.00 - 13.40 %    Eosinophils 1.80 0.00 - 6.90 %    Basophils 0.80 0.00 - 1.80 %    " Immature Granulocytes 0.30 0.00 - 0.90 %    Nucleated RBC 0.00 /100 WBC    Neutrophils (Absolute) 4.05 2.00 - 7.15 K/uL    Lymphs (Absolute) 1.81 1.00 - 4.80 K/uL    Monos (Absolute) 0.49 0.00 - 0.85 K/uL    Eos (Absolute) 0.12 0.00 - 0.51 K/uL    Baso (Absolute) 0.05 0.00 - 0.12 K/uL    Immature Granulocytes (abs) 0.02 0.00 - 0.11 K/uL    NRBC (Absolute) 0.00 K/uL   ESTIMATED GFR    Collection Time: 03/01/21  6:15 AM   Result Value Ref Range    GFR If African American >60 >60 mL/min/1.73 m 2    GFR If Non African American >60 >60 mL/min/1.73 m 2       Current Facility-Administered Medications   Medication Frequency   • lisinopril (PRINIVIL) tablet 30 mg DAILY   • sulfamethoxazole-trimethoprim (BACTRIM DS) 800-160 MG tablet 1 tablet Q12HRS   • enoxaparin (LOVENOX) inj 40 mg DAILY   • HYDROcodone-acetaminophen (NORCO) 5-325 MG per tablet 1 tablet Q4HRS PRN   • HYDROcodone-acetaminophen (NORCO) 5-325 MG per tablet 2 tablet Q4HRS PRN   • senna-docusate (PERICOLACE or SENOKOT S) 8.6-50 MG per tablet 2 tablet BID    And   • polyethylene glycol/lytes (MIRALAX) PACKET 1 Packet QDAY PRN    And   • magnesium hydroxide (MILK OF MAGNESIA) suspension 30 mL QDAY PRN    And   • bisacodyl (DULCOLAX) suppository 10 mg QDAY PRN   • acetaminophen (Tylenol) tablet 650 mg Q6HRS PRN   • estradiol (ESTRACE) tablet 0.5 mg DAILY   • medroxyPROGESTERone (PROVERA) tablet 2.5 mg DAILY   • Respiratory Therapy Consult Continuous RT   • Pharmacy Consult Request ...Pain Management Review 1 Each PHARMACY TO DOSE   • hydrALAZINE (APRESOLINE) tablet 25 mg Q8HRS PRN   • artificial tears ophthalmic solution 1 Drop PRN   • benzocaine-menthol (CEPACOL) lozenge 1 Lozenge Q2HRS PRN   • mag hydrox-al hydrox-simeth (MAALOX PLUS ES or MYLANTA DS) suspension 20 mL Q2HRS PRN   • ondansetron (ZOFRAN ODT) dispertab 4 mg 4X/DAY PRN    Or   • ondansetron (ZOFRAN) syringe/vial injection 4 mg 4X/DAY PRN   • traZODone (DESYREL) tablet 50 mg QHS PRN   • sodium  chloride (OCEAN) 0.65 % nasal spray 2 Spray PRN       Orders Placed This Encounter   Procedures   • Diet Order Diet: Regular     Standing Status:   Standing     Number of Occurrences:   1     Order Specific Question:   Diet:     Answer:   Regular [1]       Assessment:  Active Hospital Problems    Diagnosis    • *Pelvic fracture (HCC)    • Leukocytosis    • Pelvic hematoma in female    • Postmenopausal    • Opiate dependence (HCC)    • HTN (hypertension)    • Osteoporosis, senile        Medical Decision Making and Plan:  Debility/deconditioning secondary to multiple closed fractures of the pelvis after fall 2/17/2021   - Nonoperative management recommended per Ortho (Dr. Telles).  -Initiate acute inpatient rehabilitation program involving physical therapy and Occupational Therapy  -Weightbearing as tolerated  -Follow-up Ortho 4 weeks (approximately mid March)     Nociceptive pain: Has pain contract with [facility].  Per her report has prescription for hydrocodone  mg 4 times a day as needed.  However patient states she does not take that much and often cuts them in half.  -Continue Percocet 5 to 10 mg as needed pain every 4 hours--> changed to hydrocodone per patient request given that she is normally on hydrocodone through her pain management group.  -Hydrocodone 5-325 mg for moderate pain, hydrocodone  mg for severe pain.  -Follow-up pain 2/25 -improved with change to hydrocodone.     Hypertension:  - Hydralazine 25 mg every 8 as needed  -Lisinopril 30 mg tab daily    Postmenopausal:  -  On estradiol + Provera on transfer.  Continue.     Hyponatremia: Mild.  134.  -Encourage p.o. intake.  -Repeat CMP 3/1 WNL     Elevated BUN: Encourage fluids.   -Repeat BMP 2/26 improved to 22.  -Repeat BMP 3/1-WNL     Leukocytosis: Resolved on admission. + UA 2/25.  -Repeat CBC 3/1 WNL     Bowel: Senokot 2 tablets twice daily + as needed bowel meds.  -Continue to monitor     Bladder/+ UTI: Pelvic hematoma near  bladder.  CT confirmed no bladder involvement.  Had Sandoval removed prior to transfer.   - Dysuria 2/25, UA -positive.  Resistant to nitrofurantoin. Started Bactrim 2/26 for 5-day course to end 3/1.    Sleep  -Trazodone 50 mg at bedtime as needed    DVT prophylaxis:   -Lovenox 40 mg injection daily    Total time:  26 minutes.  I spent greater than 50% of the time for patient care and coordination on this date, including unit/floor time, and face-to-face time with the patient as per assessment and plan above.    Wilfredo Birmingham, DO   Physical Medicine and Rehabilitation

## 2021-03-01 NOTE — THERAPY
Occupational Therapy  Daily Treatment     Patient Name: Nafisa Hancock  Age:  80 y.o., Sex:  female  Medical Record #: [id number]  Today's Date: 3/1/2021     Precautions  Precautions: (P) Fall Risk, Weight Bearing As Tolerated Right Lower Extremity, Weight Bearing As Tolerated Right Upper Extremity  Comments: (P) Antalgic; needs significantly more time to complete transfers         Subjective    Pt received up in w/c, agreeable to OT and to showering, had already set out clean clothing to change     Objective       03/01/21 0831   Precautions   Precautions Fall Risk;Weight Bearing As Tolerated Right Lower Extremity;Weight Bearing As Tolerated Right Upper Extremity   Comments Antalgic; needs significantly more time to complete transfers   Functional Level of Assist   Grooming Modified Independent;Standing   Grooming Description Increased time   Bathing Supervision   Bathing Description Grab bar;Hand held shower;Increased time;Supervision for safety  (declined to wash hair)   Upper Body Dressing Modified Independent   Upper Body Dressing Description Increased time   Lower Body Dressing Supervision   Lower Body Dressing Description Verbal cueing;Increased time  (VCs for positioning, FWW for standing balance as needed)   Toileting Modified Independent   Toileting Description Grab bar;Increased time;Adaptive equipment  (FWW)   Toilet Transfers Supervised   Toilet Transfer Description Increased time;Grab bar;Adaptive equipment  (FWW)   Tub / Shower Transfers Supervised   Tub Shower Transfer Description Adaptive equipment;Grab bar;Shower bench;Supervision for safety  (FWW)   Sitting Upper Body Exercises   Chest Press 2 sets of 10;Right ;Left  (3# RUE, no weight LUE)   Front Arm Raise 2 sets of 10;Right ;Left  (3# RUE, no weight LUE)   Bicep Curls 2 sets of 10;Right ;Left  (3# BUE)   Upper Extremity Bike Level 1 Resistance  (fluidobike 13 min 1.3 miles)   Balance   Comments functional mobility with FWW in-room and  DAVID dick   OT Total Time Spent   OT Individual Total Time Spent (Mins) 90   OT Charge Group   OT Self Care / ADL 4   OT Therapeutic Exercise  2       Assessment    Pt tolerated session well, requires increased time for ADL completion for pain management, resistant to AE use for lower body dressing and able to complete without AE given additional time and min verbal cues for positioning. Pt can be verbose requiring redirection at times but participates to best of ability.   Strengths: Alert and oriented, Independent prior level of function, Motivated for self care and independence, Pleasant and cooperative, Supportive family, Willingly participates in therapeutic activities  Barriers: Decreased endurance, Difficulty following instructions, Generalized weakness, Impaired activity tolerance, Impaired appetite/intake, Impaired balance, Limited mobility, Pain poorly managed    Plan    Continued education on importance of using the toilet instead of brief, pain mgmt, functional mobility, ADLs at walker level, iADLs        Occupational Therapy Goals     Problem: Bathing     Dates: Start: 02/26/21       Goal: STG-Within one week, patient will bathe     Dates: Start: 02/26/21       Description: 1) Individualized Goal:  with Mod I and AE as needed  2) Interventions:  OT Group Therapy, OT Self Care/ADL, OT Cognitive Skill Dev, OT Community Reintegration, OT Manual Ther Technique, OT Neuro Re-Ed/Balance, OT Therapeutic Activity, OT Evaluation, and OT Therapeutic Exercise                   Problem: Dressing     Dates: Start: 02/26/21       Goal: STG-Within one week, patient will dress UB     Dates: Start: 02/26/21       Description: 1) Individualized Goal:  with Mod I and AE as needed  2) Interventions:  OT Group Therapy, OT Self Care/ADL, OT Cognitive Skill Dev, OT Community Reintegration, OT Manual Ther Technique, OT Neuro Re-Ed/Balance, OT Therapeutic Activity, OT Evaluation, and OT Therapeutic Exercise             Goal:  STG-Within one week, patient will dress LB     Dates: Start: 02/26/21       Description: 1) Individualized Goal:  with Mod I and AE as needed  2) Interventions:  OT Group Therapy, OT Self Care/ADL, OT Cognitive Skill Dev, OT Community Reintegration, OT Manual Ther Technique, OT Neuro Re-Ed/Balance, OT Therapeutic Activity, OT Evaluation, and OT Therapeutic Exercise                   Problem: OT Long Term Goals     Dates: Start: 02/23/21       Goal: LTG-By discharge, patient will complete basic self care tasks     Dates: Start: 02/23/21       Description: 1) Individualized Goal:  with CGA and AE as needed  2) Interventions:  OT Group Therapy, OT Self Care/ADL, OT Cognitive Skill Dev, OT Community Reintegration, OT Manual Ther Technique, OT Neuro Re-Ed/Balance, OT Therapeutic Activity, OT Evaluation, and OT Therapeutic Exercise               Goal: LTG-By discharge, patient will perform bathroom transfers     Dates: Start: 02/23/21       Description: 1) Individualized Goal:  with CGA and AE as needed  2) Interventions:  OT Group Therapy, OT Self Care/ADL, OT Cognitive Skill Dev, OT Community Reintegration, OT Manual Ther Technique, OT Neuro Re-Ed/Balance, OT Therapeutic Activity, OT Evaluation, and OT Therapeutic Exercise                Problem: Toileting     Dates: Start: 02/26/21       Goal: STG-Within one week, patient will complete toileting tasks     Dates: Start: 02/26/21

## 2021-03-02 ENCOUNTER — TELEPHONE (OUTPATIENT)
Dept: HEALTH INFORMATION MANAGEMENT | Facility: OTHER | Age: 81
End: 2021-03-02

## 2021-03-02 PROCEDURE — 97530 THERAPEUTIC ACTIVITIES: CPT

## 2021-03-02 PROCEDURE — 97116 GAIT TRAINING THERAPY: CPT

## 2021-03-02 PROCEDURE — 97110 THERAPEUTIC EXERCISES: CPT

## 2021-03-02 PROCEDURE — A9270 NON-COVERED ITEM OR SERVICE: HCPCS | Performed by: PHYSICAL MEDICINE & REHABILITATION

## 2021-03-02 PROCEDURE — 700111 HCHG RX REV CODE 636 W/ 250 OVERRIDE (IP): Performed by: PHYSICAL MEDICINE & REHABILITATION

## 2021-03-02 PROCEDURE — 97535 SELF CARE MNGMENT TRAINING: CPT

## 2021-03-02 PROCEDURE — 700102 HCHG RX REV CODE 250 W/ 637 OVERRIDE(OP): Performed by: PHYSICAL MEDICINE & REHABILITATION

## 2021-03-02 PROCEDURE — 770010 HCHG ROOM/CARE - REHAB SEMI PRIVAT*

## 2021-03-02 PROCEDURE — 99232 SBSQ HOSP IP/OBS MODERATE 35: CPT | Performed by: PHYSICAL MEDICINE & REHABILITATION

## 2021-03-02 RX ADMIN — ESTRADIOL 0.5 MG: 1 TABLET ORAL at 07:50

## 2021-03-02 RX ADMIN — HYDROCODONE BITARTRATE AND ACETAMINOPHEN 2 TABLET: 5; 325 TABLET ORAL at 09:58

## 2021-03-02 RX ADMIN — LISINOPRIL 30 MG: 20 TABLET ORAL at 07:49

## 2021-03-02 RX ADMIN — TRAZODONE HYDROCHLORIDE 50 MG: 50 TABLET ORAL at 19:59

## 2021-03-02 RX ADMIN — HYDROCODONE BITARTRATE AND ACETAMINOPHEN 2 TABLET: 5; 325 TABLET ORAL at 01:15

## 2021-03-02 RX ADMIN — HYDROCODONE BITARTRATE AND ACETAMINOPHEN 2 TABLET: 5; 325 TABLET ORAL at 13:42

## 2021-03-02 RX ADMIN — SULFAMETHOXAZOLE AND TRIMETHOPRIM 1 TABLET: 800; 160 TABLET ORAL at 07:50

## 2021-03-02 RX ADMIN — HYDROCODONE BITARTRATE AND ACETAMINOPHEN 2 TABLET: 5; 325 TABLET ORAL at 19:58

## 2021-03-02 RX ADMIN — ENOXAPARIN SODIUM 40 MG: 40 INJECTION SUBCUTANEOUS at 07:50

## 2021-03-02 RX ADMIN — SULFAMETHOXAZOLE AND TRIMETHOPRIM 1 TABLET: 800; 160 TABLET ORAL at 19:59

## 2021-03-02 RX ADMIN — MEDROXYPROGESTERONE ACETATE 2.5 MG: 2.5 TABLET ORAL at 09:58

## 2021-03-02 RX ADMIN — HYDROCODONE BITARTRATE AND ACETAMINOPHEN 2 TABLET: 5; 325 TABLET ORAL at 05:29

## 2021-03-02 ASSESSMENT — GAIT ASSESSMENTS
GAIT LEVEL OF ASSIST: SUPERVISED
ASSISTIVE DEVICE: FRONT WHEEL WALKER
DEVIATION: BRADYKINETIC;DECREASED BASE OF SUPPORT;DECREASED HEEL STRIKE

## 2021-03-02 ASSESSMENT — PAIN DESCRIPTION - PAIN TYPE: TYPE: ACUTE PAIN

## 2021-03-02 ASSESSMENT — ACTIVITIES OF DAILY LIVING (ADL): BED_CHAIR_WHEELCHAIR_TRANSFER_DESCRIPTION: ADAPTIVE EQUIPMENT;VERBAL CUEING;SUPERVISION FOR SAFETY

## 2021-03-02 NOTE — CARE PLAN
Problem: Safety  Goal: Will remain free from falls  Outcome: PROGRESSING AS EXPECTED     Problem: Bowel/Gastric:  Goal: Will not experience complications related to bowel motility  Outcome: PROGRESSING AS EXPECTED  Note: Refused bowel medications.

## 2021-03-02 NOTE — THERAPY
"Physical Therapy   Daily Treatment     Patient Name: Nafisa Hancock  Age:  80 y.o., Sex:  female  Medical Record #: [id number]  Today's Date: 3/1/2021     Precautions  Precautions: Fall Risk, Weight Bearing As Tolerated Right Lower Extremity, Weight Bearing As Tolerated Right Upper Extremity  Comments: Antalgic; needs significantly more time to complete transfers    Subjective    Pt seated EOB, request assist to scoot further to edge before transfer OOB.      Objective       03/01/21 1501   Precautions   Precautions Fall Risk;Weight Bearing As Tolerated Right Lower Extremity;Weight Bearing As Tolerated Right Upper Extremity   Transfer Functional Level of Assist   Bed, Chair, Wheelchair Transfer Minimal Assist  (bed mobility min, remainder spv)   Bed Chair Wheelchair Transfer Description Increased time;Set-up of equipment;Assist with two limbs  (EOB> WC > supine in bed)   Sitting Lower Body Exercises   Nustep Resistance Level 3  (15 min, avg 40 SPM )   Interdisciplinary Plan of Care Collaboration   Patient Position at End of Therapy In Bed;Call Light within Reach;Tray Table within Reach   PT Total Time Spent   PT Individual Total Time Spent (Mins) 30   PT Charge Group   PT Therapeutic Exercise 1   PT Therapeutic Activities 1       Assessment    Pt motivated to participate in nustep this session. Reported appreciation and \"I think this really helps me the most\".    Strengths: Able to follow instructions, Adequate strength, Alert and oriented, Effective communication skills, Good carryover of learning, Good insight into deficits/needs, Independent prior level of function, Motivated for self care and independence, Pleasant and cooperative, Willingly participates in therapeutic activities  Barriers: Decreased endurance, Fatigue, Impaired activity tolerance, Impaired balance, Limited mobility    Plan    Continue LE strength and endurance, standing tolerance and balance, gait with FWW.     Physical Therapy Problems     " Problem: Mobility     Dates: Start: 02/23/21       Goal: STG-Within one week, patient will ambulate up/down a curb     Dates: Start: 02/23/21       Description: 1) Individualized goal:  CGA with LRAD to ambulate up single step to get into home.   2) Interventions:  PT E Stim Attended, PT Gait Training, PT Self Care/Home Eval, PT Therapeutic Exercises, PT Neuro Re-Ed/Balance, PT Aquatic Therapy, PT Therapeutic Activity, and PT Manual Therapy                  Problem: Mobility Transfers     Dates: Start: 02/23/21       Goal: STG-Within one week, patient will perform bed mobility     Dates: Start: 02/23/21       Description: 1) Individualized goal:  Supervised to demonstrate improved bed mobility skills for independence at home.  2) Interventions:  PT E Stim Attended, PT Gait Training, PT Self Care/Home Eval, PT Therapeutic Exercises, PT Neuro Re-Ed/Balance, PT Aquatic Therapy, PT Therapeutic Activity, and PT Manual Therapy          Goal: STG-Within one week, patient will sit to stand     Dates: Start: 02/23/21       Description: 1) Individualized goal:  SBA from WC with LRAD.   2) Interventions:  PT E Stim Attended, PT Gait Training, PT Self Care/Home Eval, PT Therapeutic Exercises, PT Neuro Re-Ed/Balance, PT Aquatic Therapy, PT Therapeutic Activity, and PT Manual Therapy                Problem: PT-Long Term Goals     Dates: Start: 02/23/21       Goal: LTG-By discharge, patient will ambulate     Dates: Start: 02/23/21       Description: 1) Individualized goal:  150' supervised to demonstrate improved independence in home.   2) Interventions:  PT E Stim Attended, PT Gait Training, PT Self Care/Home Eval, PT Therapeutic Exercises, PT Neuro Re-Ed/Balance, PT Aquatic Therapy, PT Therapeutic Activity, and PT Manual Therapy          Goal: LTG-By discharge, patient will perform home exercise program     Dates: Start: 02/23/21       Description: 1) Individualized goal:  Independently to demonstrate independence with care at  home.   2) Interventions:  PT E Stim Attended, PT Gait Training, PT Self Care/Home Eval, PT Therapeutic Exercises, PT Neuro Re-Ed/Balance, PT Aquatic Therapy, PT Therapeutic Activity, and PT Manual Therapy          Goal: LTG-By discharge, patient will ambulate up/down 4-6 stairs     Dates: Start: 02/23/21       Description: 1) Individualized goal:  Supervised with 1HR to demonstrate improved independence with community ambulation.   2) Interventions:  PT E Stim Attended, PT Gait Training, PT Self Care/Home Eval, PT Therapeutic Exercises, PT Neuro Re-Ed/Balance, PT Aquatic Therapy, PT Therapeutic Activity, and PT Manual Therapy          Goal: LTG-By discharge, patient will transfer in/out of a car     Dates: Start: 02/23/21       Description: 1) Individualized goal:  SBA and minimal verbal cuing for transfer home.   2) Interventions:  PT E Stim Attended, PT Gait Training, PT Self Care/Home Eval, PT Therapeutic Exercises, PT Neuro Re-Ed/Balance, PT Aquatic Therapy, PT Therapeutic Activity, and PT Manual Therapy

## 2021-03-02 NOTE — THERAPY
Physical Therapy   Daily Treatment     Patient Name: Nafisa Hancock  Age:  80 y.o., Sex:  female  Medical Record #: [id number]  Today's Date: 3/2/2021     Precautions  Precautions: Fall Risk, Weight Bearing As Tolerated Right Lower Extremity, Weight Bearing As Tolerated Right Upper Extremity  Comments: Antalgic; needs significantly more time to complete transfers    Subjective    Pt was agreeable to PT     Objective       03/02/21 1031   Transfer Functional Level of Assist   Bed, Chair, Wheelchair Transfer Stand by Assist   Bed Chair Wheelchair Transfer Description Adaptive equipment;Verbal cueing;Supervision for safety   Sitting Lower Body Exercises   Sitting Lower Body Exercises Yes   Nustep Resistance Level 4;Time (See Comments)  (15' B LE/UE for general endurance/ROM)   Bed Mobility    Sit to Stand Stand by Assist   Interdisciplinary Plan of Care Collaboration   Patient Position at End of Therapy Seated;Self Releasing Lap Belt Applied;Call Light within Reach;Tray Table within Reach   PT Total Time Spent   PT Individual Total Time Spent (Mins) 30   PT Charge Group   PT Therapeutic Exercise 1   PT Therapeutic Activities 1   Supervising Physical Therapist Adriana Barney       Assessment    Pt tolerated nustep activity well with no c/o of increased pain. Pt did require vc for hand placement with STS as wel as increased time to come to stand from the wc > the FWW  Strengths: Able to follow instructions, Adequate strength, Alert and oriented, Effective communication skills, Good carryover of learning, Good insight into deficits/needs, Independent prior level of function, Motivated for self care and independence, Pleasant and cooperative, Willingly participates in therapeutic activities  Barriers: Decreased endurance, Fatigue, Impaired activity tolerance, Impaired balance, Limited mobility    Plan    Continue LE strength and endurance, standing tolerance and balance, gait with FWW.     Physical Therapy Problems      Problem: Mobility     Dates: Start: 02/23/21       Goal: STG-Within one week, patient will ambulate up/down a curb     Dates: Start: 02/23/21       Description: 1) Individualized goal:  CGA with LRAD to ambulate up single step to get into home.   2) Interventions:  PT E Stim Attended, PT Gait Training, PT Self Care/Home Eval, PT Therapeutic Exercises, PT Neuro Re-Ed/Balance, PT Aquatic Therapy, PT Therapeutic Activity, and PT Manual Therapy                  Problem: Mobility Transfers     Dates: Start: 02/23/21       Goal: STG-Within one week, patient will perform bed mobility     Dates: Start: 02/23/21       Description: 1) Individualized goal:  Supervised to demonstrate improved bed mobility skills for independence at home.  2) Interventions:  PT E Stim Attended, PT Gait Training, PT Self Care/Home Eval, PT Therapeutic Exercises, PT Neuro Re-Ed/Balance, PT Aquatic Therapy, PT Therapeutic Activity, and PT Manual Therapy          Goal: STG-Within one week, patient will sit to stand     Dates: Start: 02/23/21       Description: 1) Individualized goal:  SBA from WC with LRAD.   2) Interventions:  PT E Stim Attended, PT Gait Training, PT Self Care/Home Eval, PT Therapeutic Exercises, PT Neuro Re-Ed/Balance, PT Aquatic Therapy, PT Therapeutic Activity, and PT Manual Therapy                Problem: PT-Long Term Goals     Dates: Start: 02/23/21       Goal: LTG-By discharge, patient will ambulate     Dates: Start: 02/23/21       Description: 1) Individualized goal:  150' supervised to demonstrate improved independence in home.   2) Interventions:  PT E Stim Attended, PT Gait Training, PT Self Care/Home Eval, PT Therapeutic Exercises, PT Neuro Re-Ed/Balance, PT Aquatic Therapy, PT Therapeutic Activity, and PT Manual Therapy          Goal: LTG-By discharge, patient will perform home exercise program     Dates: Start: 02/23/21       Description: 1) Individualized goal:  Independently to demonstrate independence with care at  home.   2) Interventions:  PT E Stim Attended, PT Gait Training, PT Self Care/Home Eval, PT Therapeutic Exercises, PT Neuro Re-Ed/Balance, PT Aquatic Therapy, PT Therapeutic Activity, and PT Manual Therapy          Goal: LTG-By discharge, patient will ambulate up/down 4-6 stairs     Dates: Start: 02/23/21       Description: 1) Individualized goal:  Supervised with 1HR to demonstrate improved independence with community ambulation.   2) Interventions:  PT E Stim Attended, PT Gait Training, PT Self Care/Home Eval, PT Therapeutic Exercises, PT Neuro Re-Ed/Balance, PT Aquatic Therapy, PT Therapeutic Activity, and PT Manual Therapy          Goal: LTG-By discharge, patient will transfer in/out of a car     Dates: Start: 02/23/21       Description: 1) Individualized goal:  SBA and minimal verbal cuing for transfer home.   2) Interventions:  PT E Stim Attended, PT Gait Training, PT Self Care/Home Eval, PT Therapeutic Exercises, PT Neuro Re-Ed/Balance, PT Aquatic Therapy, PT Therapeutic Activity, and PT Manual Therapy

## 2021-03-02 NOTE — CARE PLAN
Problem: Safety  Goal: Will remain free from injury  Outcome: PROGRESSING AS EXPECTED     Problem: Infection  Goal: Will remain free from infection  Outcome: PROGRESSING AS EXPECTED  Note: Pt is on oral antibiotics for UTI. No acute signs and symptoms noted at this time.      Problem: Venous Thromboembolism (VTW)/Deep Vein Thrombosis (DVT) Prevention:  Goal: Patient will participate in Venous Thrombosis (VTE)/Deep Vein Thrombosis (DVT)Prevention Measures  Outcome: PROGRESSING AS EXPECTED  Flowsheets (Taken 3/2/2021 1043)  Pharmacologic Prophylaxis Used: LMWH: Enoxaparin(Lovenox)

## 2021-03-02 NOTE — DISCHARGE PLANNING
ATTN: Case Management  RE: Referral for Home Health    As of 3/2/2021, we have accepted the Home Health referral for the patient listed above.    A [facility] clinician will be out to see the patient within 48 hours. If you have any questions or concerns regarding the patient’s transition to Home Health, please do not hesitate to contact us at [phone].      We look forward to collaborating with you,  [facility] Team

## 2021-03-02 NOTE — THERAPY
"Physical Therapy   Daily Treatment     Patient Name: Nafisa Hancock  Age:  80 y.o., Sex:  female  Medical Record #: [id number]  Today's Date: 3/2/2021     Precautions  Precautions: Fall Risk, Weight Bearing As Tolerated Right Lower Extremity, Weight Bearing As Tolerated Right Upper Extremity  Comments: Antalgic; needs significantly more time to complete transfers    Subjective    Pt seated in room, agreeable to PT. Reports FWW in room belongs to rehab hospital and somebody took her personal walker from home to keep it until her  came for family training. \"I really like those machines, I think that's what helps me the most.\"     Objective       03/02/21 1431   Precautions   Precautions Fall Risk;Weight Bearing As Tolerated Right Lower Extremity;Weight Bearing As Tolerated Right Upper Extremity   Comments Antalgic; needs significantly more time to complete transfers   Gait Functional Level of Assist    Gait Level Of Assist Supervised   Assistive Device Front Wheel Walker   Distance (Feet)   (250 ft and 125 ft )   Deviation Bradykinetic;Decreased Base Of Support;Decreased Heel Strike   Sitting Lower Body Exercises   Comments UE water ergometer: L3, 10 min, 0.57 miles. Motomed for BLEs: gear 2, 8 min, 0.96 miles    Bed Mobility    Sit to Supine Minimal Assist   Sit to Stand Supervised   Scooting Supervised   Interdisciplinary Plan of Care Collaboration   IDT Collaboration with  Physical Therapist Assistant (PTA);Therapy Tech   Patient Position at End of Therapy In Bed;Call Light within Reach;Tray Table within Reach   Collaboration Comments re: pt's personal FWW from home    PT Total Time Spent   PT Individual Total Time Spent (Mins) 60   PT Charge Group   PT Gait Training 1   PT Therapeutic Exercise 2   PT Therapeutic Activities 1     Pt education/ discussion regarding CLOF, DC plan and adaptive equipment. PT recommended pt rent a manual WC if she felt she needed one for community distances. Also discussed " "family training to be completed tomorrow with her .     Assessment    Pt motivated for participation. Reports she feels her walking is \"better today than it has been and no longer painful\".   Strengths: Able to follow instructions, Adequate strength, Alert and oriented, Effective communication skills, Good carryover of learning, Good insight into deficits/needs, Independent prior level of function, Motivated for self care and independence, Pleasant and cooperative, Willingly participates in therapeutic activities  Barriers: Decreased endurance, Fatigue, Impaired activity tolerance, Impaired balance, Limited mobility    Plan    Continue LE strength and endurance, standing tolerance and balance, gait with FWW.     Physical Therapy Problems     Problem: Mobility     Dates: Start: 02/23/21       Goal: STG-Within one week, patient will ambulate up/down a curb     Dates: Start: 02/23/21       Description: 1) Individualized goal:  CGA with LRAD to ambulate up single step to get into home.   2) Interventions:  PT E Stim Attended, PT Gait Training, PT Self Care/Home Eval, PT Therapeutic Exercises, PT Neuro Re-Ed/Balance, PT Aquatic Therapy, PT Therapeutic Activity, and PT Manual Therapy                  Problem: Mobility Transfers     Dates: Start: 02/23/21       Goal: STG-Within one week, patient will perform bed mobility     Dates: Start: 02/23/21       Description: 1) Individualized goal:  Supervised to demonstrate improved bed mobility skills for independence at home.  2) Interventions:  PT E Stim Attended, PT Gait Training, PT Self Care/Home Eval, PT Therapeutic Exercises, PT Neuro Re-Ed/Balance, PT Aquatic Therapy, PT Therapeutic Activity, and PT Manual Therapy          Goal: STG-Within one week, patient will sit to stand     Dates: Start: 02/23/21       Description: 1) Individualized goal:  SBA from  with LRAD.   2) Interventions:  PT E Stim Attended, PT Gait Training, PT Self Care/Home Eval, PT Therapeutic " Exercises, PT Neuro Re-Ed/Balance, PT Aquatic Therapy, PT Therapeutic Activity, and PT Manual Therapy                Problem: PT-Long Term Goals     Dates: Start: 02/23/21       Goal: LTG-By discharge, patient will ambulate     Dates: Start: 02/23/21       Description: 1) Individualized goal:  150' supervised to demonstrate improved independence in home.   2) Interventions:  PT E Stim Attended, PT Gait Training, PT Self Care/Home Eval, PT Therapeutic Exercises, PT Neuro Re-Ed/Balance, PT Aquatic Therapy, PT Therapeutic Activity, and PT Manual Therapy          Goal: LTG-By discharge, patient will perform home exercise program     Dates: Start: 02/23/21       Description: 1) Individualized goal:  Independently to demonstrate independence with care at home.   2) Interventions:  PT E Stim Attended, PT Gait Training, PT Self Care/Home Eval, PT Therapeutic Exercises, PT Neuro Re-Ed/Balance, PT Aquatic Therapy, PT Therapeutic Activity, and PT Manual Therapy          Goal: LTG-By discharge, patient will ambulate up/down 4-6 stairs     Dates: Start: 02/23/21       Description: 1) Individualized goal:  Supervised with 1HR to demonstrate improved independence with community ambulation.   2) Interventions:  PT E Stim Attended, PT Gait Training, PT Self Care/Home Eval, PT Therapeutic Exercises, PT Neuro Re-Ed/Balance, PT Aquatic Therapy, PT Therapeutic Activity, and PT Manual Therapy          Goal: LTG-By discharge, patient will transfer in/out of a car     Dates: Start: 02/23/21       Description: 1) Individualized goal:  SBA and minimal verbal cuing for transfer home.   2) Interventions:  PT E Stim Attended, PT Gait Training, PT Self Care/Home Eval, PT Therapeutic Exercises, PT Neuro Re-Ed/Balance, PT Aquatic Therapy, PT Therapeutic Activity, and PT Manual Therapy

## 2021-03-02 NOTE — TELEPHONE ENCOUNTER
TCN contacted Patient to discuss discharge plan from Rehab. Patient informed about covered services at DC such as GSC, HH and DME. Pt is agreeable to GSC and referral was sent at conclusion of call. Facility to set up HH with [facility]. Patient will also require the following DME: WC, FWW, bedside commode, and shower chair which is being ordered by facility prior to DC. Contact information given to patient for TCN should any additional questions or needs arise. TCN to follow up with pt after facility DC.

## 2021-03-02 NOTE — THERAPY
"Occupational Therapy  Daily Treatment     Patient Name: Nafisa Hancock  Age:  80 y.o., Sex:  female  Medical Record #: [id number]  Today's Date: 3/2/2021     Precautions  Precautions: Fall Risk, Weight Bearing As Tolerated Right Lower Extremity, Weight Bearing As Tolerated Right Upper Extremity  Comments: Antalgic; needs significantly more time to complete transfers    Safety   ADL Safety : Requires Supervision for Safety  Bathroom Safety: Requires Supervision for Safety    Subjective    Pt tearful and stating \"You're making me cook? I just want to work on my leg. Not all this stuff.\" Education on standing tolerance and mobility involved in cooking, but pt still refusing to complete iADLs.     Objective       03/02/21 0831   Safety    ADL Safety  Requires Supervision for Safety   Bathroom Safety Requires Supervision for Safety   Cognition    Level of Consciousness Alert   Functional Level of Assist   Grooming Modified Independent;Standing   Grooming Description Adaptive equipment;Increased time  (FWW)   Bed, Chair, Wheelchair Transfer Minimal Assist  (SBA for most of transfer min A only for R foot in to bed. SBA for out of bed)   Bed Chair Wheelchair Transfer Description Non-hospital bed;Assist with one limb;Adaptive equipment  (FWW and bed rail )   IADL Treatments   IADL Treatments Other  (pt initially agreed to kitchen mobility and baking muffins. Once standing in front of counter and looking through shelves, pt appeared tearful and said she no longer wanted to participate. Education on the importance of iADL participation. Pt refused )   Comments Car transfer : SBA  w/FWW and steady bar   Sitting Lower Body Exercises   Nustep Resistance Level 3  (15 minutes .4 miles, avg 51 spm)   Bed Mobility    Supine to Sit Stand by Assist   Sit to Supine Stand by Assist   Scooting Stand by Assist   Interdisciplinary Plan of Care Collaboration   IDT Collaboration with  Nursing   Patient Position at End of Therapy " Seated;Self Releasing Lap Belt Applied;Call Light within Reach;Tray Table within Reach;Phone within Reach   Collaboration Comments RN administered meds at end of session   Strengths & Barriers   Strengths Able to follow instructions;Alert and oriented;Effective communication skills;Good carryover of learning;Independent prior level of function;Making steady progress towards goals;Manages pain appropriately;Supportive family;Motivated for self care and independence   Barriers Generalized weakness;Impaired activity tolerance;Pain;Limited mobility  (BUE rotator cuff tears, refusing to participate in iADLs during OT)   OT Total Time Spent   OT Individual Total Time Spent (Mins) 90   OT Charge Group   OT Self Care / ADL 1   OT Therapy Activity 2   OT Therapeutic Exercise  3       Assessment    Pt bradykinetic and so OTR requesting 90 minute sessions in order to get more completed. Pt was dressed and ready for session when OTR walked in. Pt improved with bed mobility with non-hospital bed. She was able to get out at SBA, but needed min A for RLE on way in. Would benefit from bed rail at home.    Strengths: Able to follow instructions, Alert and oriented, Effective communication skills, Good carryover of learning, Independent prior level of function, Making steady progress towards goals, Manages pain appropriately, Supportive family, Motivated for self care and independence  Barriers: Generalized weakness, Impaired activity tolerance, Pain, Limited mobility(BUE rotator cuff tears, refusing to participate in iADLs during OT)    Plan    iADLs if agreeable, family training and d/c [facility] tomorrow    Occupational Therapy Goals     Problem: Bathing     Dates: Start: 02/26/21       Goal: STG-Within one week, patient will bathe     Dates: Start: 02/26/21       Description: 1) Individualized Goal:  with Mod I and AE as needed  2) Interventions:  OT Group Therapy, OT Self Care/ADL, OT Cognitive Skill Dev, OT Community  Reintegration, OT Manual Ther Technique, OT Neuro Re-Ed/Balance, OT Therapeutic Activity, OT Evaluation, and OT Therapeutic Exercise                   Problem: Dressing     Dates: Start: 02/26/21       Goal: STG-Within one week, patient will dress UB     Dates: Start: 02/26/21       Description: 1) Individualized Goal:  with Mod I and AE as needed  2) Interventions:  OT Group Therapy, OT Self Care/ADL, OT Cognitive Skill Dev, OT Community Reintegration, OT Manual Ther Technique, OT Neuro Re-Ed/Balance, OT Therapeutic Activity, OT Evaluation, and OT Therapeutic Exercise             Goal: STG-Within one week, patient will dress LB     Dates: Start: 02/26/21       Description: 1) Individualized Goal:  with Mod I and AE as needed  2) Interventions:  OT Group Therapy, OT Self Care/ADL, OT Cognitive Skill Dev, OT Community Reintegration, OT Manual Ther Technique, OT Neuro Re-Ed/Balance, OT Therapeutic Activity, OT Evaluation, and OT Therapeutic Exercise                   Problem: OT Long Term Goals     Dates: Start: 02/23/21       Goal: LTG-By discharge, patient will complete basic self care tasks     Dates: Start: 02/23/21       Description: 1) Individualized Goal:  with CGA and AE as needed  2) Interventions:  OT Group Therapy, OT Self Care/ADL, OT Cognitive Skill Dev, OT Community Reintegration, OT Manual Ther Technique, OT Neuro Re-Ed/Balance, OT Therapeutic Activity, OT Evaluation, and OT Therapeutic Exercise               Goal: LTG-By discharge, patient will perform bathroom transfers     Dates: Start: 02/23/21       Description: 1) Individualized Goal:  with CGA and AE as needed  2) Interventions:  OT Group Therapy, OT Self Care/ADL, OT Cognitive Skill Dev, OT Community Reintegration, OT Manual Ther Technique, OT Neuro Re-Ed/Balance, OT Therapeutic Activity, OT Evaluation, and OT Therapeutic Exercise                Problem: Toileting     Dates: Start: 02/26/21       Goal: STG-Within one week, patient will complete  toileting tasks     Dates: Start: 02/26/21

## 2021-03-02 NOTE — PROGRESS NOTES
"Rehab Progress Note     Chief Complaint: Pelvic fracture    Interval Events (Subjective)  Patient is doing well.  Progressing as expected.  Discussed going home on Thursday, patient has no concerns she is very much looking forward to getting home.  Discussed her staples with nursing, may be able to remove them prior to discharge as that will be day #13.  A few are ready to come out today.    Objective:  VITAL SIGNS: /65   Pulse 86   Temp 36.3 °C (97.3 °F) (Oral)   Resp 18   Ht 1.651 m (5' 5\")   Wt 62 kg (136 lb 11 oz)   LMP 01/13/2017 (Exact Date) Comment: Due to hormone therapy to prevent cancer  SpO2 94%   BMI 22.75 kg/m²     Physical Exam:  Vitals: /65   Pulse 86   Temp 36.3 °C (97.3 °F) (Oral)   Resp 18   Ht 1.651 m (5' 5\")   Wt 62 kg (136 lb 11 oz)   SpO2 94%   Gen: NAD  Head: NC/AT  Eyes/ Nose/ Mouth: PERRLA, moist mucous membranes  Cardio: RRR, no mumurs  Pulm: CTAB, with normal respiratory effort  Abd: Soft NTND, active bowel sounds,   Ext: No peripheral edema. No calf tenderness. No clubbing/cyanosis.       Tone: no spasticity noted, no cogwheeling noted    Recent Results (from the past 72 hour(s))   Basic Metabolic Panel    Collection Time: 03/01/21  6:15 AM   Result Value Ref Range    Sodium 137 135 - 145 mmol/L    Potassium 3.8 3.6 - 5.5 mmol/L    Chloride 102 96 - 112 mmol/L    Co2 23 20 - 33 mmol/L    Glucose 94 65 - 99 mg/dL    Bun 18 8 - 22 mg/dL    Creatinine 0.73 0.50 - 1.40 mg/dL    Calcium 9.1 8.5 - 10.5 mg/dL    Anion Gap 12.0 7.0 - 16.0   CBC WITH DIFFERENTIAL    Collection Time: 03/01/21  6:15 AM   Result Value Ref Range    WBC 6.5 4.8 - 10.8 K/uL    RBC 4.16 (L) 4.20 - 5.40 M/uL    Hemoglobin 12.6 12.0 - 16.0 g/dL    Hematocrit 39.2 37.0 - 47.0 %    MCV 94.2 81.4 - 97.8 fL    MCH 30.3 27.0 - 33.0 pg    MCHC 32.1 (L) 33.6 - 35.0 g/dL    RDW 47.3 35.9 - 50.0 fL    Platelet Count 400 164 - 446 K/uL    MPV 10.3 9.0 - 12.9 fL    Neutrophils-Polys 61.90 44.00 - 72.00 %    " Lymphocytes 27.70 22.00 - 41.00 %    Monocytes 7.50 0.00 - 13.40 %    Eosinophils 1.80 0.00 - 6.90 %    Basophils 0.80 0.00 - 1.80 %    Immature Granulocytes 0.30 0.00 - 0.90 %    Nucleated RBC 0.00 /100 WBC    Neutrophils (Absolute) 4.05 2.00 - 7.15 K/uL    Lymphs (Absolute) 1.81 1.00 - 4.80 K/uL    Monos (Absolute) 0.49 0.00 - 0.85 K/uL    Eos (Absolute) 0.12 0.00 - 0.51 K/uL    Baso (Absolute) 0.05 0.00 - 0.12 K/uL    Immature Granulocytes (abs) 0.02 0.00 - 0.11 K/uL    NRBC (Absolute) 0.00 K/uL   ESTIMATED GFR    Collection Time: 03/01/21  6:15 AM   Result Value Ref Range    GFR If African American >60 >60 mL/min/1.73 m 2    GFR If Non African American >60 >60 mL/min/1.73 m 2       Current Facility-Administered Medications   Medication Frequency   • lisinopril (PRINIVIL) tablet 30 mg DAILY   • sulfamethoxazole-trimethoprim (BACTRIM DS) 800-160 MG tablet 1 tablet Q12HRS   • enoxaparin (LOVENOX) inj 40 mg DAILY   • HYDROcodone-acetaminophen (NORCO) 5-325 MG per tablet 1 tablet Q4HRS PRN   • HYDROcodone-acetaminophen (NORCO) 5-325 MG per tablet 2 tablet Q4HRS PRN   • senna-docusate (PERICOLACE or SENOKOT S) 8.6-50 MG per tablet 2 tablet BID    And   • polyethylene glycol/lytes (MIRALAX) PACKET 1 Packet QDAY PRN    And   • magnesium hydroxide (MILK OF MAGNESIA) suspension 30 mL QDAY PRN    And   • bisacodyl (DULCOLAX) suppository 10 mg QDAY PRN   • acetaminophen (Tylenol) tablet 650 mg Q6HRS PRN   • estradiol (ESTRACE) tablet 0.5 mg DAILY   • medroxyPROGESTERone (PROVERA) tablet 2.5 mg DAILY   • Respiratory Therapy Consult Continuous RT   • Pharmacy Consult Request ...Pain Management Review 1 Each PHARMACY TO DOSE   • hydrALAZINE (APRESOLINE) tablet 25 mg Q8HRS PRN   • artificial tears ophthalmic solution 1 Drop PRN   • benzocaine-menthol (CEPACOL) lozenge 1 Lozenge Q2HRS PRN   • mag hydrox-al hydrox-simeth (MAALOX PLUS ES or MYLANTA DS) suspension 20 mL Q2HRS PRN   • ondansetron (ZOFRAN ODT) dispertab 4 mg 4X/DAY  PRN    Or   • ondansetron (ZOFRAN) syringe/vial injection 4 mg 4X/DAY PRN   • traZODone (DESYREL) tablet 50 mg QHS PRN   • sodium chloride (OCEAN) 0.65 % nasal spray 2 Spray PRN       Orders Placed This Encounter   Procedures   • Diet Order Diet: Regular     Standing Status:   Standing     Number of Occurrences:   1     Order Specific Question:   Diet:     Answer:   Regular [1]       Assessment:  Active Hospital Problems    Diagnosis    • *Pelvic fracture (HCC)    • Leukocytosis    • Pelvic hematoma in female    • Postmenopausal    • Opiate dependence (HCC)    • HTN (hypertension)    • Osteoporosis, senile        Medical Decision Making and Plan:  Debility/deconditioning secondary to multiple closed fractures of the pelvis after fall 2/17/2021   - Nonoperative management recommended per Ortho (Dr. Telles).  -Initiate acute inpatient rehabilitation program involving physical therapy and Occupational Therapy  -Weightbearing as tolerated  -Follow-up Ortho 4 weeks (approximately mid March)  - Alden can come out 3/5, possibly prior to discharge on 3/4     Nociceptive pain: Has pain contract with [facility].  Per her report has prescription for hydrocodone  mg 4 times a day as needed.  However patient states she does not take that much and often cuts them in half.  -Continue Percocet 5 to 10 mg as needed pain every 4 hours--> changed to hydrocodone per patient request given that she is normally on hydrocodone through her pain management group.  -Hydrocodone 5-325 mg for moderate pain, hydrocodone  mg for severe pain.  -Follow-up pain 2/25 -improved with change to hydrocodone.     Hypertension:  - Hydralazine 25 mg every 8 as needed  -Lisinopril 30 mg tab daily    Postmenopausal:  -  On estradiol + Provera on transfer.  Continue.     Hyponatremia: Mild.  134.  -Encourage p.o. intake.  -Repeat CMP 3/1 WNL     Elevated BUN: Encourage fluids.   -Repeat BMP 2/26 improved to 22.  -Repeat BMP  3/1-WNL     Leukocytosis: Resolved on admission. + UA 2/25.  -Repeat CBC 3/1 WNL     Bowel: Senokot 2 tablets twice daily + as needed bowel meds.  -Continue to monitor     Bladder/+ UTI: Pelvic hematoma near bladder.  CT confirmed no bladder involvement.  Had Sandoval removed prior to transfer.   - Dysuria 2/25, UA -positive.  Resistant to nitrofurantoin. Started Bactrim 2/26 for 5-day course to end 3/1.    Sleep  -Trazodone 50 mg at bedtime as needed    DVT prophylaxis:   -Lovenox 40 mg injection daily    Total time:  28 minutes.  I spent greater than 50% of the time for patient care and coordination on this date, including unit/floor time, and face-to-face time with the patient as per assessment and plan above.    Wilfredo Birmingham, DO   Physical Medicine and Rehabilitation

## 2021-03-03 PROCEDURE — 97530 THERAPEUTIC ACTIVITIES: CPT

## 2021-03-03 PROCEDURE — 700102 HCHG RX REV CODE 250 W/ 637 OVERRIDE(OP): Performed by: PHYSICAL MEDICINE & REHABILITATION

## 2021-03-03 PROCEDURE — 700111 HCHG RX REV CODE 636 W/ 250 OVERRIDE (IP): Performed by: PHYSICAL MEDICINE & REHABILITATION

## 2021-03-03 PROCEDURE — 97535 SELF CARE MNGMENT TRAINING: CPT

## 2021-03-03 PROCEDURE — A9270 NON-COVERED ITEM OR SERVICE: HCPCS | Performed by: PHYSICAL MEDICINE & REHABILITATION

## 2021-03-03 PROCEDURE — 770010 HCHG ROOM/CARE - REHAB SEMI PRIVAT*

## 2021-03-03 PROCEDURE — 99232 SBSQ HOSP IP/OBS MODERATE 35: CPT | Performed by: PHYSICAL MEDICINE & REHABILITATION

## 2021-03-03 RX ADMIN — HYDROCODONE BITARTRATE AND ACETAMINOPHEN 2 TABLET: 5; 325 TABLET ORAL at 10:11

## 2021-03-03 RX ADMIN — ENOXAPARIN SODIUM 40 MG: 40 INJECTION SUBCUTANEOUS at 08:01

## 2021-03-03 RX ADMIN — MEDROXYPROGESTERONE ACETATE 2.5 MG: 2.5 TABLET ORAL at 08:01

## 2021-03-03 RX ADMIN — ESTRADIOL 0.5 MG: 1 TABLET ORAL at 08:01

## 2021-03-03 RX ADMIN — HYDROCODONE BITARTRATE AND ACETAMINOPHEN 2 TABLET: 5; 325 TABLET ORAL at 01:00

## 2021-03-03 RX ADMIN — HYDROCODONE BITARTRATE AND ACETAMINOPHEN 2 TABLET: 5; 325 TABLET ORAL at 05:33

## 2021-03-03 RX ADMIN — HYDROCODONE BITARTRATE AND ACETAMINOPHEN 2 TABLET: 5; 325 TABLET ORAL at 20:56

## 2021-03-03 RX ADMIN — TRAZODONE HYDROCHLORIDE 50 MG: 50 TABLET ORAL at 20:56

## 2021-03-03 RX ADMIN — LISINOPRIL 30 MG: 20 TABLET ORAL at 08:01

## 2021-03-03 RX ADMIN — HYDROCODONE BITARTRATE AND ACETAMINOPHEN 2 TABLET: 5; 325 TABLET ORAL at 16:44

## 2021-03-03 ASSESSMENT — GAIT ASSESSMENTS
ASSISTIVE DEVICE: FRONT WHEEL WALKER
GAIT LEVEL OF ASSIST: SUPERVISED
DEVIATION: BRADYKINETIC;DECREASED BASE OF SUPPORT
GAIT LEVEL OF ASSIST: MODIFIED INDEPENDENT
DISTANCE (FEET): 95
DISTANCE (FEET): 115
ASSISTIVE DEVICE: 4 WHEEL WALKER
DEVIATION: BRADYKINETIC;DECREASED BASE OF SUPPORT

## 2021-03-03 ASSESSMENT — ACTIVITIES OF DAILY LIVING (ADL)
SHOWER_TRANSFER_LEVEL_OF_ASSIST: REQUIRES SUPERVISION WITH SHOWER TRANSFER
TOILET_TRANSFER_DESCRIPTION: GRAB BAR;INCREASED TIME
TOILETING_LEVEL_OF_ASSIST: ABLE TO COMPLETE TOILETING WITHOUT ASSIST
TOILETING_LEVEL_OF_ASSIST_DESCRIPTION: ADAPTIVE EQUIPMENT;INCREASED TIME;GRAB BAR
TOILET_TRANSFER_DESCRIPTION: GRAB BAR;INCREASED TIME;SET-UP OF EQUIPMENT
TOILET_TRANSFER_LEVEL_OF_ASSIST: ABLE TO COMPLETE TOILET TRANSFER WITHOUT ASSIST

## 2021-03-03 ASSESSMENT — PAIN DESCRIPTION - PAIN TYPE: TYPE: ACUTE PAIN

## 2021-03-03 NOTE — CARE PLAN
Problem: OT Long Term Goals  Goal: LTG-By discharge, patient will complete basic self care tasks  Description: 1) Individualized Goal:  with CGA and AE as needed  2) Interventions:  OT Group Therapy, OT Self Care/ADL, OT Cognitive Skill Dev, OT Community Reintegration, OT Manual Ther Technique, OT Neuro Re-Ed/Balance, OT Therapeutic Activity, OT Evaluation, and OT Therapeutic Exercise       Outcome: MET  Goal: LTG-By discharge, patient will perform bathroom transfers  Description: 1) Individualized Goal:  with CGA and AE as needed  2) Interventions:  OT Group Therapy, OT Self Care/ADL, OT Cognitive Skill Dev, OT Community Reintegration, OT Manual Ther Technique, OT Neuro Re-Ed/Balance, OT Therapeutic Activity, OT Evaluation, and OT Therapeutic Exercise  Outcome: MET     Problem: Bathing  Goal: STG-Within one week, patient will bathe  Description: 1) Individualized Goal:  with Mod I and AE as needed  2) Interventions:  OT Group Therapy, OT Self Care/ADL, OT Cognitive Skill Dev, OT Community Reintegration, OT Manual Ther Technique, OT Neuro Re-Ed/Balance, OT Therapeutic Activity, OT Evaluation, and OT Therapeutic Exercise     Outcome: MET     Problem: Dressing  Goal: STG-Within one week, patient will dress UB  Description: 1) Individualized Goal:  with Mod I and AE as needed  2) Interventions:  OT Group Therapy, OT Self Care/ADL, OT Cognitive Skill Dev, OT Community Reintegration, OT Manual Ther Technique, OT Neuro Re-Ed/Balance, OT Therapeutic Activity, OT Evaluation, and OT Therapeutic Exercise     Outcome: MET  Goal: STG-Within one week, patient will dress LB  Description: 1) Individualized Goal:  with Mod I and AE as needed  2) Interventions:  OT Group Therapy, OT Self Care/ADL, OT Cognitive Skill Dev, OT Community Reintegration, OT Manual Ther Technique, OT Neuro Re-Ed/Balance, OT Therapeutic Activity, OT Evaluation, and OT Therapeutic Exercise     Outcome: MET     Problem: Toileting  Goal: STG-Within one week,  patient will complete toileting tasks  Outcome: MET

## 2021-03-03 NOTE — THERAPY
"Occupational Therapy  Daily Treatment     Patient Name: Nafisa Hancock  Age:  80 y.o., Sex:  female  Medical Record #: [id number]  Today's Date: 3/3/2021     Precautions  Precautions: Fall Risk, Weight Bearing As Tolerated Right Lower Extremity  Comments: Antalgic; needs significantly more time to complete transfers    Safety   ADL Safety : Requires Supervision for Safety  Bathroom Safety: Requires Supervision for Safety    Subjective    \"Oh yes, PT went over the fall recovery packet. I have all the pictures.\" pt able to verbalize how to recover from a fall. OT briefly demonstrated for pt's S.O.     Objective       03/03/21 1101   Functional Level of Assist   Bed, Chair, Wheelchair Transfer Minimal Assist   Bed Chair Wheelchair Transfer Description Non-hospital bed;Assist with one limb;Verbal cueing;Supervision for safety   Tub / Shower Transfers Supervised  (simulated home set up: tub/shower with tub trnfr bench)   Interdisciplinary Plan of Care Collaboration   IDT Collaboration with  Family / Caregiver;Physical Therapist   Patient Position at End of Therapy Seated;Self Releasing Lap Belt Applied;Call Light within Reach;Tray Table within Reach;Phone within Reach   Collaboration Comments family training   OT Total Time Spent   OT Individual Total Time Spent (Mins) 30   OT Charge Group   OT Self Care / ADL 2     Family training of the above self care tasks. Provided handout with tub transfer bench and bed rail. Educated on importance of grab bars.     Assessment    Pt's S.O. had a shuffling gait and was furniture walking throughout session. He is able to help her manage her feet in to bed, but I emphasized the importance of him not picking her up off the floor if she were to have a fall. Pt, otherwise, only needs supervision. Pt and S.O. agreed that CGA would be best for when she first gets home.    Strengths: Able to follow instructions, Alert and oriented, Effective communication skills, Good carryover of " learning, Independent prior level of function, Making steady progress towards goals, Manages pain appropriately, Supportive family, Motivated for self care and independence  Barriers: Generalized weakness, Impaired activity tolerance, Pain, Limited mobility(BUE rotator cuff tears, refusing to participate in iADLs during OT)    Plan    Shower this afternoon, progress to 4WW    Occupational Therapy Goals     Problem: Bathing     Dates: Start: 02/26/21       Goal: STG-Within one week, patient will bathe     Dates: Start: 02/26/21       Description: 1) Individualized Goal:  with Mod I and AE as needed  2) Interventions:  OT Group Therapy, OT Self Care/ADL, OT Cognitive Skill Dev, OT Community Reintegration, OT Manual Ther Technique, OT Neuro Re-Ed/Balance, OT Therapeutic Activity, OT Evaluation, and OT Therapeutic Exercise                   Problem: Dressing     Dates: Start: 02/26/21       Goal: STG-Within one week, patient will dress UB     Dates: Start: 02/26/21       Description: 1) Individualized Goal:  with Mod I and AE as needed  2) Interventions:  OT Group Therapy, OT Self Care/ADL, OT Cognitive Skill Dev, OT Community Reintegration, OT Manual Ther Technique, OT Neuro Re-Ed/Balance, OT Therapeutic Activity, OT Evaluation, and OT Therapeutic Exercise             Goal: STG-Within one week, patient will dress LB     Dates: Start: 02/26/21       Description: 1) Individualized Goal:  with Mod I and AE as needed  2) Interventions:  OT Group Therapy, OT Self Care/ADL, OT Cognitive Skill Dev, OT Community Reintegration, OT Manual Ther Technique, OT Neuro Re-Ed/Balance, OT Therapeutic Activity, OT Evaluation, and OT Therapeutic Exercise                   Problem: OT Long Term Goals     Dates: Start: 02/23/21       Goal: LTG-By discharge, patient will complete basic self care tasks     Dates: Start: 02/23/21       Description: 1) Individualized Goal:  with CGA and AE as needed  2) Interventions:  OT Group Therapy, OT Self  Care/ADL, OT Cognitive Skill Dev, OT Community Reintegration, OT Manual Ther Technique, OT Neuro Re-Ed/Balance, OT Therapeutic Activity, OT Evaluation, and OT Therapeutic Exercise               Goal: LTG-By discharge, patient will perform bathroom transfers     Dates: Start: 02/23/21       Description: 1) Individualized Goal:  with CGA and AE as needed  2) Interventions:  OT Group Therapy, OT Self Care/ADL, OT Cognitive Skill Dev, OT Community Reintegration, OT Manual Ther Technique, OT Neuro Re-Ed/Balance, OT Therapeutic Activity, OT Evaluation, and OT Therapeutic Exercise                Problem: Toileting     Dates: Start: 02/26/21       Goal: STG-Within one week, patient will complete toileting tasks     Dates: Start: 02/26/21

## 2021-03-03 NOTE — THERAPY
"Physical Therapy   Daily Treatment     Patient Name: Nafisa Hancock  Age:  80 y.o., Sex:  female  Medical Record #: [id number]  Today's Date: 3/3/2021     Precautions  Precautions: (P) Fall Risk, Weight Bearing As Tolerated Right Lower Extremity  Comments: Antalgic; needs significantly more time to complete transfers    Subjective    Pt in bed, agreeable to PT.      Objective       03/03/21 0831   Precautions   Precautions Fall Risk;Weight Bearing As Tolerated Right Lower Extremity   Gait Functional Level of Assist    Gait Level Of Assist Modified Independent   Assistive Device Front Wheel Walker   Distance (Feet) 115   # of Times Distance was Traveled 1   Deviation Bradykinetic;Decreased Base Of Support   Stairs Functional Level of Assist   Level of Assist with Stairs Contact Guard Assist   # of Stairs Climbed 1  (4\" curb/ step)   Stairs Description Extra time;Walker   Transfer Functional Level of Assist   Bed, Chair, Wheelchair Transfer Minimal Assist  (assist with bed mobility only, mod I for remainder)   Bed Chair Wheelchair Transfer Description Increased time;Assist with one limb;Set-up of equipment  (bed > WC)   Toilet Transfers Supervised   Toilet Transfer Description Grab bar;Increased time;Set-up of equipment   Bed Mobility    Supine to Sit Modified Independent   Sit to Stand Modified Independent   Scooting Minimal Assist  (scooting to EOB )   Interdisciplinary Plan of Care Collaboration   Patient Position at End of Therapy Seated;Call Light within Reach;Tray Table within Reach   PT Total Time Spent   PT Individual Total Time Spent (Mins) 60   PT Charge Group   PT Therapeutic Activities 4     Pt issued and reviewed fall information packet and discussed home set-up/ safety for fall prevention. Also issued LE seated there-ex HEP.     Assessment    Pt is able to complete all functional activities at mod I to spv level except for bed mobility. Voices comfort with DC home tomorrow.     Strengths: Able to " follow instructions, Adequate strength, Alert and oriented, Effective communication skills, Good carryover of learning, Good insight into deficits/needs, Independent prior level of function, Motivated for self care and independence, Pleasant and cooperative, Willingly participates in therapeutic activities  Barriers: Decreased endurance, Fatigue, Impaired activity tolerance, Impaired balance, Limited mobility    Plan    Complete family training later today. DC tomorrow.     Physical Therapy Problems     Problem: Mobility     Dates: Start: 02/23/21       Goal: STG-Within one week, patient will ambulate up/down a curb     Dates: Start: 02/23/21       Description: 1) Individualized goal:  CGA with LRAD to ambulate up single step to get into home.   2) Interventions:  PT E Stim Attended, PT Gait Training, PT Self Care/Home Eval, PT Therapeutic Exercises, PT Neuro Re-Ed/Balance, PT Aquatic Therapy, PT Therapeutic Activity, and PT Manual Therapy                  Problem: Mobility Transfers     Dates: Start: 02/23/21       Goal: STG-Within one week, patient will perform bed mobility     Dates: Start: 02/23/21       Description: 1) Individualized goal:  Supervised to demonstrate improved bed mobility skills for independence at home.  2) Interventions:  PT E Stim Attended, PT Gait Training, PT Self Care/Home Eval, PT Therapeutic Exercises, PT Neuro Re-Ed/Balance, PT Aquatic Therapy, PT Therapeutic Activity, and PT Manual Therapy          Goal: STG-Within one week, patient will sit to stand     Dates: Start: 02/23/21       Description: 1) Individualized goal:  SBA from  with LRAD.   2) Interventions:  PT E Stim Attended, PT Gait Training, PT Self Care/Home Eval, PT Therapeutic Exercises, PT Neuro Re-Ed/Balance, PT Aquatic Therapy, PT Therapeutic Activity, and PT Manual Therapy                Problem: PT-Long Term Goals     Dates: Start: 02/23/21       Goal: LTG-By discharge, patient will ambulate     Dates: Start: 02/23/21        Description: 1) Individualized goal:  150' supervised to demonstrate improved independence in home.   2) Interventions:  PT E Stim Attended, PT Gait Training, PT Self Care/Home Eval, PT Therapeutic Exercises, PT Neuro Re-Ed/Balance, PT Aquatic Therapy, PT Therapeutic Activity, and PT Manual Therapy          Goal: LTG-By discharge, patient will perform home exercise program     Dates: Start: 02/23/21       Description: 1) Individualized goal:  Independently to demonstrate independence with care at home.   2) Interventions:  PT E Stim Attended, PT Gait Training, PT Self Care/Home Eval, PT Therapeutic Exercises, PT Neuro Re-Ed/Balance, PT Aquatic Therapy, PT Therapeutic Activity, and PT Manual Therapy          Goal: LTG-By discharge, patient will ambulate up/down 4-6 stairs     Dates: Start: 02/23/21       Description: 1) Individualized goal:  Supervised with 1HR to demonstrate improved independence with community ambulation.   2) Interventions:  PT E Stim Attended, PT Gait Training, PT Self Care/Home Eval, PT Therapeutic Exercises, PT Neuro Re-Ed/Balance, PT Aquatic Therapy, PT Therapeutic Activity, and PT Manual Therapy          Goal: LTG-By discharge, patient will transfer in/out of a car     Dates: Start: 02/23/21       Description: 1) Individualized goal:  SBA and minimal verbal cuing for transfer home.   2) Interventions:  PT E Stim Attended, PT Gait Training, PT Self Care/Home Eval, PT Therapeutic Exercises, PT Neuro Re-Ed/Balance, PT Aquatic Therapy, PT Therapeutic Activity, and PT Manual Therapy

## 2021-03-03 NOTE — THERAPY
"Physical Therapy   Daily Treatment     Patient Name: Nafisa Hancock  Age:  80 y.o., Sex:  female  Medical Record #: [id number]  Today's Date: 3/3/2021     Precautions  Precautions: Fall Risk, Weight Bearing As Tolerated Right Lower Extremity  Comments: Antalgic; needs significantly more time to complete transfers    Subjective    \"What about one of those walkers with the seat? Would that work for me?\"     Objective       03/03/21 1031   Precautions   Precautions Fall Risk;Weight Bearing As Tolerated Right Lower Extremity   Gait Functional Level of Assist    Gait Level Of Assist Supervised   Assistive Device 4 Wheel Walker   Distance (Feet) 95   # of Times Distance was Traveled 1   Deviation Bradykinetic;Decreased Base Of Support   Interdisciplinary Plan of Care Collaboration   IDT Collaboration with  Family / Caregiver;Occupational Therapist   Patient Position at End of Therapy Seated;Family / Friend in Room  (in back gym)   Collaboration Comments pt's SO present for last 5 min of session. pt care passed to OT    PT Total Time Spent   PT Individual Total Time Spent (Mins) 30   PT Charge Group   PT Therapeutic Activities 2     Pt education/ discussion regarding FWW vs 4WW use and safety with 4WW use. Pt able to demonstrate safe use of 4WW and requested to order one for DC.    Pt's  present for last 5 min, briefly discussed pt's CLOF, equipment, and DC plan.    Assessment    Pt demonstrates readiness to transition home tomorrow. SO with no concerns for safe DC and assist.     Strengths: Able to follow instructions, Adequate strength, Alert and oriented, Effective communication skills, Good carryover of learning, Good insight into deficits/needs, Independent prior level of function, Motivated for self care and independence, Pleasant and cooperative, Willingly participates in therapeutic activities  Barriers: Decreased endurance, Fatigue, Impaired activity tolerance, Impaired balance, Limited " mobility    Plan    DC home tomorrow.    Physical Therapy Problems     Problem: Mobility     Dates: Start: 02/23/21       Goal: STG-Within one week, patient will ambulate up/down a curb     Dates: Start: 02/23/21       Description: 1) Individualized goal:  CGA with LRAD to ambulate up single step to get into home.   2) Interventions:  PT E Stim Attended, PT Gait Training, PT Self Care/Home Eval, PT Therapeutic Exercises, PT Neuro Re-Ed/Balance, PT Aquatic Therapy, PT Therapeutic Activity, and PT Manual Therapy                  Problem: Mobility Transfers     Dates: Start: 02/23/21       Goal: STG-Within one week, patient will perform bed mobility     Dates: Start: 02/23/21       Description: 1) Individualized goal:  Supervised to demonstrate improved bed mobility skills for independence at home.  2) Interventions:  PT E Stim Attended, PT Gait Training, PT Self Care/Home Eval, PT Therapeutic Exercises, PT Neuro Re-Ed/Balance, PT Aquatic Therapy, PT Therapeutic Activity, and PT Manual Therapy          Goal: STG-Within one week, patient will sit to stand     Dates: Start: 02/23/21       Description: 1) Individualized goal:  SBA from WC with LRAD.   2) Interventions:  PT E Stim Attended, PT Gait Training, PT Self Care/Home Eval, PT Therapeutic Exercises, PT Neuro Re-Ed/Balance, PT Aquatic Therapy, PT Therapeutic Activity, and PT Manual Therapy                Problem: PT-Long Term Goals     Dates: Start: 02/23/21       Goal: LTG-By discharge, patient will ambulate     Dates: Start: 02/23/21       Description: 1) Individualized goal:  150' supervised to demonstrate improved independence in home.   2) Interventions:  PT E Stim Attended, PT Gait Training, PT Self Care/Home Eval, PT Therapeutic Exercises, PT Neuro Re-Ed/Balance, PT Aquatic Therapy, PT Therapeutic Activity, and PT Manual Therapy          Goal: LTG-By discharge, patient will perform home exercise program     Dates: Start: 02/23/21       Description: 1)  Individualized goal:  Independently to demonstrate independence with care at home.   2) Interventions:  PT E Stim Attended, PT Gait Training, PT Self Care/Home Eval, PT Therapeutic Exercises, PT Neuro Re-Ed/Balance, PT Aquatic Therapy, PT Therapeutic Activity, and PT Manual Therapy          Goal: LTG-By discharge, patient will ambulate up/down 4-6 stairs     Dates: Start: 02/23/21       Description: 1) Individualized goal:  Supervised with 1HR to demonstrate improved independence with community ambulation.   2) Interventions:  PT E Stim Attended, PT Gait Training, PT Self Care/Home Eval, PT Therapeutic Exercises, PT Neuro Re-Ed/Balance, PT Aquatic Therapy, PT Therapeutic Activity, and PT Manual Therapy          Goal: LTG-By discharge, patient will transfer in/out of a car     Dates: Start: 02/23/21       Description: 1) Individualized goal:  SBA and minimal verbal cuing for transfer home.   2) Interventions:  PT E Stim Attended, PT Gait Training, PT Self Care/Home Eval, PT Therapeutic Exercises, PT Neuro Re-Ed/Balance, PT Aquatic Therapy, PT Therapeutic Activity, and PT Manual Therapy

## 2021-03-03 NOTE — CARE PLAN
Problem: Safety  Goal: Will remain free from injury  Outcome: PROGRESSING AS EXPECTED     Problem: Venous Thromboembolism (VTW)/Deep Vein Thrombosis (DVT) Prevention:  Goal: Patient will participate in Venous Thrombosis (VTE)/Deep Vein Thrombosis (DVT)Prevention Measures  Outcome: PROGRESSING AS EXPECTED  Flowsheets (Taken 3/3/2021 1406)  Pharmacologic Prophylaxis Used: LMWH: Enoxaparin(Lovenox)     Problem: Bowel/Gastric:  Goal: Normal bowel function is maintained or improved  Outcome: PROGRESSING AS EXPECTED  Last BM: 03/02/21     Problem: Pain Management  Goal: Pain level will decrease to patient's comfort goal  Outcome: PROGRESSING AS EXPECTED

## 2021-03-03 NOTE — DISCHARGE PLANNING
Case Management Discharge Instructions        Discharge Location: Home with Home Health     Agency Name / Phone: [facility]  [phone]   Registered Nurse, Occupational Therapist, Physical Therapist     Comments: Home health will call to set up initial appointment    Medical Equipment Agency Name / Phone: [facility] [phone]    4 wheel walker with seat       Follow-up Information:    Dale Telles M.D.  [phone]  Orthopaedics    [address] [facility] [place] [address]   Tuesday 3.16.2021, appointment at 9:45 am.       Nohemi Cosby D.O. [phone]  Physiatry (rehab MD)    [address] [facility] [place] [address]   Wednesday 3.17.2021, check in at 12:50pm for 1:20pm appointment.     Map provided.     Rene Chang M.D.  [phone]  Primary Care Provider    [address]    [place] [address]   Monday 3.22.2021, appointment at 11:30am.      Rene Chang M.D. [phone]  Primary Care Provider    [address]    [place] [address]   Friday 8.6.2021, appointment at 2:00 pm

## 2021-03-03 NOTE — CARE PLAN
Problem: Safety  Goal: Will remain free from injury  Note: Pt remains free of accidental injury at this time. Able to verbalize needs and does not attempt self transfer. Bed rails x2 secured for safety. Call light and personal belongings within reach.      Problem: Pain Management  Goal: Pain level will decrease to patient's comfort goal  Note: Medicated pt with [place] for right hip pain. Pillows provided for comfort. Maintained room conducive to sleep and rest. Will continue to assess and monitor for pain

## 2021-03-03 NOTE — THERAPY
"Occupational Therapy  Daily Treatment     Patient Name: Nafisa Hancock  Age:  80 y.o., Sex:  female  Medical Record #: [id number]  Today's Date: 3/3/2021     Precautions  Precautions: Fall Risk, Weight Bearing As Tolerated Right Lower Extremity  Comments: Antalgic; needs significantly more time to complete transfers    Safety   ADL Safety : Requires Supervision for Safety  Bathroom Safety: Requires Supervision for Safety    Subjective    \"I couldn't have been here on my way out if it weren't for you all. I'm going to come in all dressed up. You won't recognize me. I'm going to ask for you, the sergeant.\"     Objective       03/03/21 1301   Functional Level of Assist   Eating Independent   Grooming Modified Independent;Standing   Grooming Description Increased time   Bathing Modified Independent   Bathing Description Grab bar;Tub bench;Increased time   Upper Body Dressing Modified Independent   Upper Body Dressing Description Increased time   Lower Body Dressing Modified Independent   Lower Body Dressing Description Assistive devices;Grab bar;Increased time   Toileting Modified Independent   Toileting Description Adaptive equipment;Increased time;Grab bar   Toilet Transfers Modified Independent   Toilet Transfer Description Grab bar;Increased time   Tub / Shower Transfers Supervised   Tub Shower Transfer Description Set-up of equipment;Adaptive equipment;Grab bar   Interdisciplinary Plan of Care Collaboration   Patient Position at End of Therapy Seated;Self Releasing Lap Belt Applied;Call Light within Reach;Tray Table within Reach;Phone within Reach   OT Total Time Spent   OT Individual Total Time Spent (Mins) 60   OT Charge Group   OT Self Care / ADL 4       Assessment    Pt mod I for most ADLs. Still recommending SBA at home since it will be a different environment from the hospital.    Strengths: Able to follow instructions, Alert and oriented, Effective communication skills, Good carryover of learning, " Independent prior level of function, Making steady progress towards goals, Manages pain appropriately, Supportive family, Motivated for self care and independence  Barriers: Generalized weakness, Impaired activity tolerance, Pain, Limited mobility(BUE rotator cuff tears, refusing to participate in iADLs during OT)    Plan    D/c tomorrow    Occupational Therapy Goals     Problem: Bathing     Dates: Start: 02/26/21       Goal: STG-Within one week, patient will bathe     Dates: Start: 02/26/21       Description: 1) Individualized Goal:  with Mod I and AE as needed  2) Interventions:  OT Group Therapy, OT Self Care/ADL, OT Cognitive Skill Dev, OT Community Reintegration, OT Manual Ther Technique, OT Neuro Re-Ed/Balance, OT Therapeutic Activity, OT Evaluation, and OT Therapeutic Exercise                   Problem: Dressing     Dates: Start: 02/26/21       Goal: STG-Within one week, patient will dress UB     Dates: Start: 02/26/21       Description: 1) Individualized Goal:  with Mod I and AE as needed  2) Interventions:  OT Group Therapy, OT Self Care/ADL, OT Cognitive Skill Dev, OT Community Reintegration, OT Manual Ther Technique, OT Neuro Re-Ed/Balance, OT Therapeutic Activity, OT Evaluation, and OT Therapeutic Exercise             Goal: STG-Within one week, patient will dress LB     Dates: Start: 02/26/21       Description: 1) Individualized Goal:  with Mod I and AE as needed  2) Interventions:  OT Group Therapy, OT Self Care/ADL, OT Cognitive Skill Dev, OT Community Reintegration, OT Manual Ther Technique, OT Neuro Re-Ed/Balance, OT Therapeutic Activity, OT Evaluation, and OT Therapeutic Exercise                   Problem: OT Long Term Goals     Dates: Start: 02/23/21       Goal: LTG-By discharge, patient will complete basic self care tasks     Dates: Start: 02/23/21       Description: 1) Individualized Goal:  with CGA and AE as needed  2) Interventions:  OT Group Therapy, OT Self Care/ADL, OT Cognitive Skill Dev,  OT Community Reintegration, OT Manual Ther Technique, OT Neuro Re-Ed/Balance, OT Therapeutic Activity, OT Evaluation, and OT Therapeutic Exercise               Goal: LTG-By discharge, patient will perform bathroom transfers     Dates: Start: 02/23/21       Description: 1) Individualized Goal:  with CGA and AE as needed  2) Interventions:  OT Group Therapy, OT Self Care/ADL, OT Cognitive Skill Dev, OT Community Reintegration, OT Manual Ther Technique, OT Neuro Re-Ed/Balance, OT Therapeutic Activity, OT Evaluation, and OT Therapeutic Exercise                Problem: Toileting     Dates: Start: 02/26/21       Goal: STG-Within one week, patient will complete toileting tasks     Dates: Start: 02/26/21

## 2021-03-03 NOTE — PROGRESS NOTES
"Rehab Progress Note     Chief Complaint: Pelvic fracture    Interval Events (Subjective)  Overall doing well.  Patient excited to go home soon.  No new complaints today.  Slept well.  Having bowel movements, voiding freely.    Objective:  VITAL SIGNS: /62   Pulse 80   Temp 36.4 °C (97.6 °F) (Oral)   Resp 18   Ht 1.651 m (5' 5\")   Wt 62 kg (136 lb 11 oz)   LMP 01/13/2017 (Exact Date) Comment: Due to hormone therapy to prevent cancer  SpO2 94%   BMI 22.75 kg/m²     Physical Exam:  Vitals: /62   Pulse 80   Temp 36.4 °C (97.6 °F) (Oral)   Resp 18   Ht 1.651 m (5' 5\")   Wt 62 kg (136 lb 11 oz)   SpO2 94%   Gen: NAD  Head: NC/AT  Eyes/ Nose/ Mouth: PERRLA, moist mucous membranes  Cardio: RRR, no mumurs  Pulm: CTAB, with normal respiratory effort  Abd: Soft NTND, active bowel sounds,   Ext: No peripheral edema. No calf tenderness. No clubbing/cyanosis.       Tone: no spasticity noted, no cogwheeling noted    Recent Results (from the past 72 hour(s))   Basic Metabolic Panel    Collection Time: 03/01/21  6:15 AM   Result Value Ref Range    Sodium 137 135 - 145 mmol/L    Potassium 3.8 3.6 - 5.5 mmol/L    Chloride 102 96 - 112 mmol/L    Co2 23 20 - 33 mmol/L    Glucose 94 65 - 99 mg/dL    Bun 18 8 - 22 mg/dL    Creatinine 0.73 0.50 - 1.40 mg/dL    Calcium 9.1 8.5 - 10.5 mg/dL    Anion Gap 12.0 7.0 - 16.0   CBC WITH DIFFERENTIAL    Collection Time: 03/01/21  6:15 AM   Result Value Ref Range    WBC 6.5 4.8 - 10.8 K/uL    RBC 4.16 (L) 4.20 - 5.40 M/uL    Hemoglobin 12.6 12.0 - 16.0 g/dL    Hematocrit 39.2 37.0 - 47.0 %    MCV 94.2 81.4 - 97.8 fL    MCH 30.3 27.0 - 33.0 pg    MCHC 32.1 (L) 33.6 - 35.0 g/dL    RDW 47.3 35.9 - 50.0 fL    Platelet Count 400 164 - 446 K/uL    MPV 10.3 9.0 - 12.9 fL    Neutrophils-Polys 61.90 44.00 - 72.00 %    Lymphocytes 27.70 22.00 - 41.00 %    Monocytes 7.50 0.00 - 13.40 %    Eosinophils 1.80 0.00 - 6.90 %    Basophils 0.80 0.00 - 1.80 %    Immature Granulocytes 0.30 0.00 " - 0.90 %    Nucleated RBC 0.00 /100 WBC    Neutrophils (Absolute) 4.05 2.00 - 7.15 K/uL    Lymphs (Absolute) 1.81 1.00 - 4.80 K/uL    Monos (Absolute) 0.49 0.00 - 0.85 K/uL    Eos (Absolute) 0.12 0.00 - 0.51 K/uL    Baso (Absolute) 0.05 0.00 - 0.12 K/uL    Immature Granulocytes (abs) 0.02 0.00 - 0.11 K/uL    NRBC (Absolute) 0.00 K/uL   ESTIMATED GFR    Collection Time: 03/01/21  6:15 AM   Result Value Ref Range    GFR If African American >60 >60 mL/min/1.73 m 2    GFR If Non African American >60 >60 mL/min/1.73 m 2       Current Facility-Administered Medications   Medication Frequency   • lisinopril (PRINIVIL) tablet 30 mg DAILY   • enoxaparin (LOVENOX) inj 40 mg DAILY   • HYDROcodone-acetaminophen (NORCO) 5-325 MG per tablet 1 tablet Q4HRS PRN   • HYDROcodone-acetaminophen (NORCO) 5-325 MG per tablet 2 tablet Q4HRS PRN   • senna-docusate (PERICOLACE or SENOKOT S) 8.6-50 MG per tablet 2 tablet BID    And   • polyethylene glycol/lytes (MIRALAX) PACKET 1 Packet QDAY PRN    And   • magnesium hydroxide (MILK OF MAGNESIA) suspension 30 mL QDAY PRN    And   • bisacodyl (DULCOLAX) suppository 10 mg QDAY PRN   • acetaminophen (Tylenol) tablet 650 mg Q6HRS PRN   • estradiol (ESTRACE) tablet 0.5 mg DAILY   • medroxyPROGESTERone (PROVERA) tablet 2.5 mg DAILY   • Respiratory Therapy Consult Continuous RT   • Pharmacy Consult Request ...Pain Management Review 1 Each PHARMACY TO DOSE   • hydrALAZINE (APRESOLINE) tablet 25 mg Q8HRS PRN   • artificial tears ophthalmic solution 1 Drop PRN   • benzocaine-menthol (CEPACOL) lozenge 1 Lozenge Q2HRS PRN   • mag hydrox-al hydrox-simeth (MAALOX PLUS ES or MYLANTA DS) suspension 20 mL Q2HRS PRN   • ondansetron (ZOFRAN ODT) dispertab 4 mg 4X/DAY PRN    Or   • ondansetron (ZOFRAN) syringe/vial injection 4 mg 4X/DAY PRN   • traZODone (DESYREL) tablet 50 mg QHS PRN   • sodium chloride (OCEAN) 0.65 % nasal spray 2 Spray PRN       Orders Placed This Encounter   Procedures   • Diet Order Diet:  Regular     Standing Status:   Standing     Number of Occurrences:   1     Order Specific Question:   Diet:     Answer:   Regular [1]       Assessment:  Active Hospital Problems    Diagnosis    • *Pelvic fracture (HCC)    • Leukocytosis    • Pelvic hematoma in female    • Postmenopausal    • Opiate dependence (HCC)    • HTN (hypertension)    • Osteoporosis, senile        Medical Decision Making and Plan:  Debility/deconditioning secondary to multiple closed fractures of the pelvis after fall 2/17/2021   - Nonoperative management recommended per Ortho (Dr. Telles).  -Initiate acute inpatient rehabilitation program involving physical therapy and Occupational Therapy  -Weightbearing as tolerated  -Follow-up Ortho 4 weeks (approximately mid March)  - Alden can come out 3/5, possibly prior to discharge on 3/4     Nociceptive pain: Has pain contract with [facility].  Per her report has prescription for hydrocodone  mg 4 times a day as needed.  However patient states she does not take that much and often cuts them in half.  -Continue Percocet 5 to 10 mg as needed pain every 4 hours--> changed to hydrocodone per patient request given that she is normally on hydrocodone through her pain management group.  -Hydrocodone 5-325 mg for moderate pain, hydrocodone  mg for severe pain.  -Follow-up pain 2/25 -improved with change to hydrocodone.     Hypertension:  - Hydralazine 25 mg every 8 as needed  -Lisinopril 30 mg tab daily    Postmenopausal:  -  On estradiol + Provera on transfer.  Continue.     Hyponatremia: Mild.  134.  -Encourage p.o. intake.  -Repeat CMP 3/1 WNL     Elevated BUN: Encourage fluids.   -Repeat BMP 2/26 improved to 22.  -Repeat BMP 3/1-WNL     Leukocytosis: Resolved on admission. + UA 2/25.  -Repeat CBC 3/1 WNL     Bowel: Senokot 2 tablets twice daily + as needed bowel meds.  -Continue to monitor     Bladder/+ UTI: Pelvic hematoma near bladder.  CT confirmed no bladder involvement.  Had Sandoval  removed prior to transfer.   - Dysuria 2/25, UA -positive.  Resistant to nitrofurantoin. Started Bactrim 2/26 for 5-day course to end 3/1.    Sleep  -Trazodone 50 mg at bedtime as needed    DVT prophylaxis:   -Lovenox 40 mg injection daily    Total time:  27 minutes.  I spent greater than 50% of the time for patient care and coordination on this date, including unit/floor time, and face-to-face time with the patient as per assessment and plan above.    Wilfredo Birmingham, DO   Physical Medicine and Rehabilitation

## 2021-03-04 VITALS
HEART RATE: 72 BPM | TEMPERATURE: 97.6 F | SYSTOLIC BLOOD PRESSURE: 129 MMHG | WEIGHT: 136.69 LBS | BODY MASS INDEX: 22.77 KG/M2 | HEIGHT: 65 IN | RESPIRATION RATE: 18 BRPM | DIASTOLIC BLOOD PRESSURE: 67 MMHG | OXYGEN SATURATION: 94 %

## 2021-03-04 PROCEDURE — A9270 NON-COVERED ITEM OR SERVICE: HCPCS | Performed by: PHYSICAL MEDICINE & REHABILITATION

## 2021-03-04 PROCEDURE — 99239 HOSP IP/OBS DSCHRG MGMT >30: CPT | Performed by: PHYSICAL MEDICINE & REHABILITATION

## 2021-03-04 PROCEDURE — 700111 HCHG RX REV CODE 636 W/ 250 OVERRIDE (IP): Performed by: PHYSICAL MEDICINE & REHABILITATION

## 2021-03-04 PROCEDURE — 700102 HCHG RX REV CODE 250 W/ 637 OVERRIDE(OP): Performed by: PHYSICAL MEDICINE & REHABILITATION

## 2021-03-04 RX ORDER — MEDROXYPROGESTERONE ACETATE 2.5 MG/1
2.5 TABLET ORAL DAILY
Qty: 30 TABLET | Refills: 2 | Status: SHIPPED | OUTPATIENT
Start: 2021-03-05 | End: 2021-06-03

## 2021-03-04 RX ORDER — ESTRADIOL 0.5 MG/1
0.5 TABLET ORAL DAILY
Qty: 30 TABLET | Refills: 2 | Status: SHIPPED | OUTPATIENT
Start: 2021-03-05 | End: 2021-06-03

## 2021-03-04 RX ORDER — TRAZODONE HYDROCHLORIDE 50 MG/1
50 TABLET ORAL
Qty: 30 TABLET | Refills: 2 | Status: SHIPPED | OUTPATIENT
Start: 2021-03-04 | End: 2021-06-02

## 2021-03-04 RX ORDER — HYDROCODONE BITARTRATE AND ACETAMINOPHEN 5; 325 MG/1; MG/1
2 TABLET ORAL EVERY 4 HOURS PRN
Qty: 140 TABLET | Refills: 0 | Status: SHIPPED | OUTPATIENT
Start: 2021-03-04 | End: 2021-03-18

## 2021-03-04 RX ORDER — LISINOPRIL 30 MG/1
30 TABLET ORAL DAILY
Qty: 30 TABLET | Refills: 2 | Status: SHIPPED | OUTPATIENT
Start: 2021-03-05 | End: 2021-05-14

## 2021-03-04 RX ADMIN — LISINOPRIL 30 MG: 20 TABLET ORAL at 07:28

## 2021-03-04 RX ADMIN — HYDROCODONE BITARTRATE AND ACETAMINOPHEN 2 TABLET: 5; 325 TABLET ORAL at 07:28

## 2021-03-04 RX ADMIN — ESTRADIOL 0.5 MG: 1 TABLET ORAL at 07:46

## 2021-03-04 RX ADMIN — MEDROXYPROGESTERONE ACETATE 2.5 MG: 2.5 TABLET ORAL at 07:46

## 2021-03-04 RX ADMIN — HYDROCODONE BITARTRATE AND ACETAMINOPHEN 2 TABLET: 5; 325 TABLET ORAL at 02:11

## 2021-03-04 RX ADMIN — ENOXAPARIN SODIUM 40 MG: 40 INJECTION SUBCUTANEOUS at 07:29

## 2021-03-04 NOTE — DISCHARGE SUMMARY
Rehab Discharge Summary    Admission Date: 2/22/2021    Discharge Date: 3/4/2021    Attending Provider: Dr Wilfredo Birmingham DO     Admission Diagnosis:   Active Hospital Problems    Diagnosis    • *Pelvic fracture (HCC)    • Leukocytosis    • Pelvic hematoma in female    • Postmenopausal    • Opiate dependence (HCC)    • HTN (hypertension)    • Osteoporosis, senile        Discharge Diagnosis:  Active Hospital Problems    Diagnosis    • *Pelvic fracture (HCC)    • Leukocytosis    • Pelvic hematoma in female    • Postmenopausal    • Opiate dependence (HCC)    • HTN (hypertension)    • Osteoporosis, senile        HPI per H&P:  Per the history and physical from patient's admission:   80 y.o. female who presented 2/17/2021 status post fall at home.  Patient reports she was walking from her den to her kitchen when her boots caught on the carpet and she fell and landed on her right hip as well as her right elbow.  Patient reports that she sustained a laceration on her right elbow, as well as experienced severe right hip pain that is worse on movement and palpation.  He denies any head trauma, LOC, seizure-like activity, bowel/bladder incontinence.  She denies any prior history of falls in the past.  Patient does report that the pain hurts too much to ambulate currently.  Otherwise, no further complaints at this time.     ED: SBP elevated into the 200s to 190s.  WBC 15, sodium 133.  CT pelvis with right inferior/superior pubic rami fracture, right symphyseal fracture, and possible right sacral alar fracture anteriorly.  Patient also noted with hematoma adjacent to right bladder.  CT pelvis with and without contrast without bladder injury.  Right elbow XR unremarkable.  Patient was given fentanyl 50 mcg as well as a tetanus shot.  Orthopedic team was consulted and nonoperative management was recommended.     Patient was evaluated by Rehab Medicine physician and Physical Therapy and Occupational Therapy and determined to be  appropriate for acute inpatient rehab and was transferred to [facility] on 2/22.  On admission patient reports that she is doing okay.  She is excited to get home but understands that she needs rehab prior to that.  She denies any significant pain in the pelvis though she does have mild pain currently controlled at this time and states that she is followed by pain management through [facility].  Denies any bladder or bowel issues.  States she was a very active person prior to admission     Pre-mobidly, the patient lived in a single level home with significant other.  With this acute therapeutic intervention, this patient hopes to improve her functional status, and return to independent living with the supportive care of signigicant other.    Patient was admitted to [facility] on 2/22/2021.     Hospital Course by Problem List:  Debility/deconditioning secondary to multiple closed fractures of the pelvis after fall 2/17/2021   - Nonoperative management recommended per Ortho (Dr. Telles).  -Initiate acute inpatient rehabilitation program involving physical therapy and Occupational Therapy  -Weightbearing as tolerated  -Follow-up Ortho 4 weeks (approximately mid March)  - Alden can come out 3/5, possibly prior to discharge on 3/4     Nociceptive pain: Has pain contract with [facility].  Per her report has prescription for hydrocodone  mg 4 times a day as needed.  However patient states she does not take that much and often cuts them in half.  -Continue Percocet 5 to 10 mg as needed pain every 4 hours--> changed to hydrocodone per patient request given that she is normally on hydrocodone through her pain management group.  -Hydrocodone 5-325 mg for moderate pain, hydrocodone  mg for severe pain.  -Follow-up pain 2/25 -improved with change to hydrocodone.     Hypertension:  - Hydralazine 25 mg every 8 as needed  -Lisinopril 30 mg tab daily     Postmenopausal:  -  On  estradiol + Provera on transfer.  Continue.     Hyponatremia: Mild.  134.  -Encourage p.o. intake.  -Repeat CMP 3/1 WNL     Elevated BUN: Encourage fluids.   -Repeat BMP 2/26 improved to 22.  -Repeat BMP 3/1-WNL     Leukocytosis: Resolved on admission. + UA 2/25.  -Repeat CBC 3/1 WNL     Bowel: Senokot 2 tablets twice daily + as needed bowel meds.  -Continue to monitor     Bladder/+ UTI: Pelvic hematoma near bladder.  CT confirmed no bladder involvement.  Had Sandoval removed prior to transfer.   - Dysuria 2/25, UA -positive.  Resistant to nitrofurantoin. Started Bactrim 2/26 for 5-day course to end 3/1.     Sleep  -Trazodone 50 mg at bedtime as needed     DVT prophylaxis:   -Lovenox 40 mg injection daily    Functional Status at Discharge  Eating:  Independent  Eating Description:  Increased time  Grooming:  Modified Independent, Standing  Grooming Description:  Increased time  Bathing:  Modified Independent  Bathing Description:  Grab bar, Tub bench, Increased time  Upper Body Dressing:  Modified Independent  Upper Body Dressing Description:  Increased time  Lower Body Dressing:  Modified Independent  Lower Body Dressing Description:  Assistive devices, Grab bar, Increased time  Discharge Location : Home  Patient Discharging with Assist of: Spouse / Significant Other  Level of Supervision Required: Intermittent Supervision(supervision initially, however, pt was mod I at hospital)  Recommended Equipment for Discharge: 4-Wheeled Walker;Tub Transfer Bench;Grab Bars by Toilet;Grab Bars in Tub / Shower;Other (See Comments)(bed rails)  Recommended Services Upon Discharge: Home Health Occupational Therapy  Long Term Goals Met: 2  Long Term Goals Not Met: 0  Criteria for Termination of Services: Maximum Function Achieved for Inpatient Rehabilitation  Walk:  Supervised  Distance Walked:  95  Number of Times Distance Was Traveled:  1  Assistive Device:  4 Wheel Walker  Gait Deviation:  Bradykinetic, Decreased Base Of  Support  Wheelchair:  Stand by Assist  Distance Propelled:  (limited to about 5 feet)   Wheelchair Description:  Extra time, Leg rest management, Impaired coordination, Supervision for safety, Verbal cueing  Stairs Contact Guard Assist  Stairs Description Extra time, Walker  Discharge Location: Home  Patient Discharging with Assist of: Spouse / Significant Other  Level of Supervision Required Upon Discharge: Intermittent Supervision  Recommended Equipment for Discharge: Front-Wheeled Walker;4-Wheeled Walker  Recommeded Services Upon Discharge: Home Health Physical Therapy  Criteria for Termination of Services: Maximum Function Achieved for Inpatient Rehabilitation  Comprehension:     Comprehension Description:     Expression:     Expression Description:     Social Interaction:     Social Interaction Description:     Problem Solving:     Problem Solving Description:     Memory:     Memory Description:          IWilfredo D.O., personally performed a complete drug regimen review and no potential clinically significant medication issues were identified.   Discharge Medication:     Medication List      START taking these medications      Instructions   HYDROcodone-acetaminophen 5-325 MG Tabs per tablet  Commonly known as: NORCO   Take 2 Tablets by mouth every four hours as needed for up to 14 days.  Dose: 2 tablet     traZODone 50 MG Tabs  Commonly known as: DESYREL   Take 1 tablet by mouth at bedtime as needed for up to 90 days.  Dose: 50 mg        CHANGE how you take these medications      Instructions   estradiol 0.5 MG tablet  Start taking on: March 5, 2021  What changed: additional instructions  Commonly known as: ESTRACE   Take 1 tablet by mouth every day for 90 days.  Dose: 0.5 mg     lisinopril 30 MG tablet  Start taking on: March 5, 2021  What changed:   · medication strength  · how much to take  Commonly known as: PRINIVIL   Take 1 tablet by mouth every day for 90 days.  Dose: 30 mg        CONTINUE  taking these medications      Instructions   medroxyPROGESTERone 2.5 MG Tabs  Start taking on: March 5, 2021  Commonly known as: PROVERA   Take 1 tablet by mouth every day for 90 days.  Dose: 2.5 mg        STOP taking these medications    bisacodyl 10 MG Supp  Commonly known as: DULCOLAX     Juni-Mag-Zinc-D Tabs     calcium carbonate 500 MG Chew  Commonly known as: TUMS     labetalol 5 MG/ML Soln  Commonly known as: NORMODYNE/TRANDATE     magnesium hydroxide 400 MG/5ML Susp  Commonly known as: MILK OF MAGNESIA     oxyCODONE-acetaminophen 5-325 MG Tabs  Commonly known as: PERCOCET     polyethylene glycol/lytes 17 g Pack  Commonly known as: MIRALAX     senna-docusate 8.6-50 MG Tabs  Commonly known as: PERICOLACE or SENOKOT S     vitamin D 1000 Unit (25 mcg) Tabs  Commonly known as: cholecalciferol            Discharge Diet:  Regular    Discharge Activity:  As tolerated    Disposition:  Patient to discharge home with family support and community resources.     Equipment:  Four-wheel walker    Follow-up & Discharge Instructions:  Follow up with your primary care provider (PCP) within 7-10 days of discharge to review your medications and take over your care.     If you develop chest pain, fever, chills, change in neurologic function (weakness, sensation changes, vision changes), or other concerning sxs, seek immediate medical attention or call 911.      Condition on Discharge:  Good    More than 35 minutes was spent on discharging this patient, including face-to-face time, prescription management, and the dictation of this note.    Wilfredo Birmingham D.O.    Date of Service: 3/4/2021

## 2021-03-04 NOTE — DISCHARGE PLANNING
Case management Summary:   Met with patient & SO Ryley prior to discharge.   Reviewed all follow up appointments: ortho, PM&R, PCP.   Referral made to [facility] and they are have accepted referral and are ready to follow.    [facility] has delivered 4WW to patient.   Family training completed 3.3.21 w/ SO Ryley.  Faxed request for [employer] handicap placard w/ confirmed receipt (original to patient).     During hospitalization, I have provided support and education and have been available for questions and information during hours of operation, communicated with therapy team and MD along with providing links/resources  to outside services.    Patient verbalizes agreement with all plans and has an understanding of the next steps within the post acute services.     Individualized Goals:   1. To walk again  2. To be able to manage at home with SO's support.  3. To improve mental clarity    Outcome:   1. Goal met & completed: mod independent w/ 4WW. It is anticipated she will continue to improve w/ OP mgmt, HH referral in place.  2. Goal met & completed: discharging home w/ SO.  3. Goal met & completed.

## 2021-03-04 NOTE — DISCHARGE INSTRUCTIONS
Discharge Instructions    Discharged to home by car with relative. Discharged via walking, hospital escort: Yes.  Special equipment needed: Walker    Be sure to schedule a follow-up appointment with your primary care doctor or any specialists as instructed.     Discharge Plan:   Influenza Vaccine Indication: Not indicated: Previously immunized this influenza season and > 8 years of age    I understand that a diet low in cholesterol, fat, and sodium is recommended for good health. Unless I have been given specific instructions below for another diet, I accept this instruction as my diet prescription.   Other diet: Regular    Special Instructions: None    · Is patient discharged on Warfarin / Coumadin?   No       Depression / Suicide Risk    As you are discharged from this [facility] facility, it is important to learn how to keep safe from harming yourself.    Recognize the warning signs:  · Abrupt changes in personality, positive or negative- including increase in energy   · Giving away possessions  · Change in eating patterns- significant weight changes-  positive or negative  · Change in sleeping patterns- unable to sleep or sleeping all the time   · Unwillingness or inability to communicate  · Depression  · Unusual sadness, discouragement and loneliness  · Talk of wanting to die  · Neglect of personal appearance   · Rebelliousness- reckless behavior  · Withdrawal from people/activities they love  · Confusion- inability to concentrate     If you or a loved one observes any of these behaviors or has concerns about self-harm, here's what you can do:  · Talk about it- your feelings and reasons for harming yourself  · Remove any means that you might use to hurt yourself (examples: pills, rope, extension cords, firearm)  · Get professional help from the community (Mental Health, Substance Abuse, psychological counseling)  · Do not be alone:Call your Safe Contact- someone whom you trust who will be there for  "you.  · Call your local CRISIS HOTLINE 237-3975 or 993-552-4545  · Call your local Children's Mobile Crisis Response Team [place] (952) 766-2278 or www.[employer]  · Call the toll free National Suicide Prevention Hotlines   · National Suicide Prevention Lifeline 044-856-BIKP (3225)  · National [employer] [place] Line Network 800-SUICIDE (173-9852)    Prevent Falls in Your Home    \"Falling once doubles your chance of falling again\"          -Center for Disease Control and Prevention    Falls in the home can lead to serious injury (fractures, brain injuries), hospitalizations, increased medical costs, and could even be fatal.  The good news is, there are many precautions you can take to avoid falls in your home and help keep you safe:     · If prescribed an assistive device (walker, crutches), use as instructed by the healthcare provider\"   · Remove any tripping hazards from your home, including loose cords, throw rugs and clutter  · Keep a nightlight on in dark (hallways, bathrooms, etc)   · Get up slowly, to make sure you feel okay before getting up  · Be aware of any side effects of your medications: some medications may make you dizzy  · Place a non-skid rubber mat in your shower or tub-consider a shower bench or chair if unsteady on your feet  · Wear supportive shoes or non-skid socks when moving around  · Start an exercise program once approved by your provider.  If you are feeling weak following a hospital stay, talk to your doctor about home health or outpatient therapy programs designed to help rebuild your strength and endurance    Physical Therapy Discharge Instructions for Nafisa Hancock    3/3/2021    Level of Assist Required for Ambulation: No Assist on Flat Surfaces, Supervision on Curbs, Supervision on Stairs  Device Recommended for Ambulation: 4-Wheeled Walker, Front-Wheeled Walker  Level of Assist Required for Transfers: Requires No Assist  Device Recommended for Transfers: Front-Wheeled Walker, " 4-Wheeled Walker  Home Exercise Program: Refer to Home Exercise Program Handout for Details    Andrew, Best of luck to you on your continued recovery and return home! Take care of yourself!  -Adriana Barney, PT, DPT    Occupational Therapy Discharge Instructions for Nafisa Hancock    3/3/2021    Level of Assist Required for Eating: Able to Complete Eating without Assist  Level of Assist Required for Grooming: Able to Complete Grooming without Assist  Level of Assist Required for Dressing: Able to Complete Dressing without Assist  Level of Assist Required for Toileting: Able to Complete Toileting without Assist  Level of Assist Required for Toilet Transfer: Able to Complete Toilet Transfer without Assist  Level of Assist Required for Bathing: Able to Complete Bathing without Assist  Level of Assist Required for Shower Transfer: Requires Supervision with Shower Transfer  Level of Assist Required for Meal Prep: Requires Supervision with Meal Preparation  Driving: Please Contact Physician Prior to Driving  Home Exercise Program: Refer to Home Exercise Program Handout for Details    Andrew,   It was a pleasure working with you while you've been at rehab. I am very happy you will be going home. I recommend that Ryley be nearby when you're walking and doing your shower transfer initially when you get home, but that you will be doing it on your own soon.   I wish you the best on the rest of your recovery!  Marley Wilson, OTR/L, CTRS

## 2021-03-04 NOTE — PROGRESS NOTES
Patient discharged to home per order.  Discharge instructions reviewed with patient; Pt verbalized understanding and signed copies placed in chart.  Patient has all belongings; signed copy of form in chart. Patient left facility at 1140 via w/c accompanied by rehab staff and significant other.  Have enjoyed working with this pleasant patient.

## 2021-03-08 ENCOUNTER — TELEPHONE (OUTPATIENT)
Dept: HEALTH INFORMATION MANAGEMENT | Facility: OTHER | Age: 81
End: 2021-03-08

## 2021-03-08 NOTE — TELEPHONE ENCOUNTER
TCN left VM for pt to discuss progress since DC home. TCN noted not SOC set up for HH or GSC. TCN sent emails to both requesting status of SOC. TCN requested call back from pt.

## 2021-03-15 ENCOUNTER — TELEPHONE (OUTPATIENT)
Dept: HEALTH INFORMATION MANAGEMENT | Facility: OTHER | Age: 81
End: 2021-03-15

## 2021-03-15 NOTE — TELEPHONE ENCOUNTER
TCN contacted pt to discuss progress home. Pt feels she is doing really well. Pt had declined HH and [facility]SC reporting she has enough support at home and she didn't want people in her house. Pt has PCP follow up on 3/22. Pt stats she walking and standing for up to 30 mi at a tie and slowly building her strength back. Pt has TCN contact information and will call TCN if she needs anything.

## 2021-03-17 ENCOUNTER — APPOINTMENT (OUTPATIENT)
Dept: PHYSICAL MEDICINE AND REHAB | Facility: REHABILITATION | Age: 81
End: 2021-03-17
Payer: MEDICARE

## 2021-03-22 ENCOUNTER — APPOINTMENT (OUTPATIENT)
Dept: MEDICAL GROUP | Facility: PHYSICIAN GROUP | Age: 81
End: 2021-03-22
Payer: MEDICARE

## 2021-04-24 ENCOUNTER — IMMUNIZATION (OUTPATIENT)
Dept: FAMILY PLANNING/WOMEN'S HEALTH CLINIC | Facility: IMMUNIZATION CENTER | Age: 81
End: 2021-04-24
Payer: MEDICARE

## 2021-04-24 DIAGNOSIS — Z23 ENCOUNTER FOR VACCINATION: Primary | ICD-10-CM

## 2021-04-24 PROCEDURE — 91300 PFIZER SARS-COV-2 VACCINE: CPT | Performed by: INTERNAL MEDICINE

## 2021-04-24 PROCEDURE — 0002A PFIZER SARS-COV-2 VACCINE: CPT | Performed by: INTERNAL MEDICINE

## 2021-05-06 ENCOUNTER — TELEPHONE (OUTPATIENT)
Dept: MEDICAL GROUP | Facility: PHYSICIAN GROUP | Age: 81
End: 2021-05-06

## 2021-05-06 NOTE — TELEPHONE ENCOUNTER
ESTABLISHED PATIENT PRE-VISIT PLANNING     Patient was NOT contacted to complete PVP.     Note: Patient will not be contacted if there is no indication to call.     1.  Reviewed notes from the last few office visits within the medical group: Yes    2.  If any orders were placed at last visit or intended to be done for this visit (i.e. 6 mos follow-up), do we have Results/Consult Notes?         •  Labs - Labs ordered and not completed  Note: If patient appointment is for lab review and patient did not complete labs, check with provider if OK to reschedule patient until labs completed.       •  Imaging - Imaging was not ordered at last office visit.       •  Referrals - No referrals were ordered at last office visit.    3. Is this appointment scheduled as a Hospital Follow-Up? Yes, visit was at [facility].     4.  Immunizations were updated in Epic using Reconcile Outside Information activity? Yes    5.  Patient is due for the following Health Maintenance Topics:   Health Maintenance Due   Topic Date Due   • Annual Wellness Visit  02/26/2021       6.  AHA (Pulse8) form printed for Provider? Yes

## 2021-05-13 ENCOUNTER — IMMUNIZATION (OUTPATIENT)
Dept: FAMILY PLANNING/WOMEN'S HEALTH CLINIC | Facility: IMMUNIZATION CENTER | Age: 81
End: 2021-05-13
Attending: INTERNAL MEDICINE
Payer: MEDICARE

## 2021-05-13 DIAGNOSIS — Z23 ENCOUNTER FOR VACCINATION: Primary | ICD-10-CM

## 2021-05-13 PROCEDURE — 91300 PFIZER SARS-COV-2 VACCINE: CPT

## 2021-05-13 PROCEDURE — 0002A PFIZER SARS-COV-2 VACCINE: CPT

## 2021-05-14 ENCOUNTER — OFFICE VISIT (OUTPATIENT)
Dept: MEDICAL GROUP | Facility: PHYSICIAN GROUP | Age: 81
End: 2021-05-14
Payer: MEDICARE

## 2021-05-14 VITALS
HEART RATE: 84 BPM | SYSTOLIC BLOOD PRESSURE: 140 MMHG | RESPIRATION RATE: 15 BRPM | BODY MASS INDEX: 22.6 KG/M2 | OXYGEN SATURATION: 97 % | DIASTOLIC BLOOD PRESSURE: 80 MMHG | WEIGHT: 135.8 LBS | TEMPERATURE: 97.9 F

## 2021-05-14 DIAGNOSIS — I10 ESSENTIAL HYPERTENSION: ICD-10-CM

## 2021-05-14 DIAGNOSIS — S32.301A CLOSED DISPLACED FRACTURE OF RIGHT ILIUM, UNSPECIFIED FRACTURE MORPHOLOGY, INITIAL ENCOUNTER (HCC): ICD-10-CM

## 2021-05-14 PROBLEM — N94.89 PELVIC HEMATOMA IN FEMALE: Status: RESOLVED | Noted: 2021-02-18 | Resolved: 2021-05-14

## 2021-05-14 PROCEDURE — 99213 OFFICE O/P EST LOW 20 MIN: CPT | Performed by: FAMILY MEDICINE

## 2021-05-14 PROCEDURE — 8041 PR SCP AHA: Performed by: FAMILY MEDICINE

## 2021-05-14 RX ORDER — LISINOPRIL 20 MG/1
20 TABLET ORAL DAILY
Qty: 90 TABLET | Refills: 3 | Status: SHIPPED | OUTPATIENT
Start: 2021-05-14 | End: 2021-05-18 | Stop reason: SDUPTHER

## 2021-05-14 RX ORDER — HYDROCODONE BITARTRATE AND ACETAMINOPHEN 10; 325 MG/1; MG/1
0.5 TABLET ORAL PRN
COMMUNITY
Start: 2021-04-20 | End: 2022-08-12 | Stop reason: SDUPTHER

## 2021-05-14 ASSESSMENT — FIBROSIS 4 INDEX: FIB4 SCORE: 1.101195522578696476

## 2021-05-14 NOTE — ASSESSMENT & PLAN NOTE
New problem  The patient had a ground level mechanical fall at home  Found to have pelvic fracture  Overall she has recovered well w/o any surgery  Pain is well controlled

## 2021-05-14 NOTE — PROGRESS NOTES
Annual Health Assessment Questions:    1.  Are you currently engaging in any exercise or physical activity? Yes    2.  How would you describe your mood or emotional well-being today? good    3.  Have you had any falls in the last year? Yes one     4.  Have you noticed any problems with your balance or had difficulty walking? Yes    5.  In the last six months have you experienced any leakage of urine? No    6. DPA/Advanced Directive: Patient does not have an Advanced Directive.  A packet and workshop information was given on Advanced Directives.

## 2021-05-14 NOTE — PROGRESS NOTES
Subjective:     Chief Complaint   Patient presents with   • Trauma     pelvic follow up    • Medication Refill     bp       HPI:   Nafisa presents today to discuss the following.      Pelvic fracture (HCC)  New problem  The patient had a ground level mechanical fall at home  Found to have pelvic fracture  Overall she has recovered well w/o any surgery  Pain is well controlled     HTN (hypertension)  Chronic issue  On lisinopril 20mg daily  Requesting a refill today      Past Medical History:   Diagnosis Date   • Arthritis 04/16/2020    hands, feet   • CATARACT 2012    bilateral phaco with IOL   • Hypertension 04/16/2020   • MEDICAL HOME 11/07/12   • OSTEOPOROSIS 6/18/2010   • Pain     right shoulder       Current Outpatient Medications Ordered in Epic   Medication Sig Dispense Refill   • HYDROcodone/acetaminophen (NORCO)  MG Tab Take 10 Tablets by mouth as needed.     • lisinopril (PRINIVIL) 20 MG Tab Take 1 tablet by mouth every day for 90 days. 90 tablet 3   • estradiol (ESTRACE) 0.5 MG tablet Take 1 tablet by mouth every day. 30 tablet 2   • medroxyPROGESTERone (PROVERA) 2.5 MG Tab Take 1 tablet by mouth every day, 30 tablet 2   • traZODone (DESYREL) 50 MG Tab Take 1 tablet by mouth at bedtime as needed. 30 tablet 2     No current Epic-ordered facility-administered medications on file.       Allergies:  Patient has no known allergies.    Health Maintenance: Completed    ROS:  Gen: no fevers/chills, no changes in weight  Eyes: no changes in vision  Pulm: no sob, no cough  CV: no chest pain, no palpitations  GI: no nausea/vomiting, no diarrhea      Objective:     Exam:  /80 (BP Location: Left arm, Patient Position: Sitting, BP Cuff Size: Adult)   Pulse 84   Temp 36.6 °C (97.9 °F) (Temporal)   Resp 15   Wt 61.6 kg (135 lb 12.8 oz)   LMP 01/13/2017 (Exact Date) Comment: Due to hormone therapy to prevent cancer  SpO2 97%   BMI 22.60 kg/m²  Body mass index is 22.6 kg/m².          Constitutional:  Alert, no distress, well-groomed.  Skin: Warm, dry, good turgor, no rashes in visible areas.  Eye: Equal, round and reactive, conjunctiva clear, lids normal.  ENMT: Lips without lesions, good dentition, moist mucous membranes.  Neck: Trachea midline, no masses, no thyromegaly.  Respiratory: Unlabored respiratory effort, no cough.  MSK: Normal gait, moves all extremities.  Neuro: Grossly non-focal.   Psych: Alert and oriented x3, normal affect and mood.        Assessment & Plan:     80 y.o. female with the following -     1. Closed displaced fracture of right ilium, unspecified fracture morphology, initial encounter ([facility])  This is a new problem.  The patient suffered a ground-level fall back in February 2021.  She was found to have a pelvic fracture.  She recovered very well and returns for follow-up today.  Denies any acute issues.  She has been using her four-wheel walker to avoid falls.  Fall precautions were also given today.    2. Essential hypertension  Chronic, stable condition.  We will continue with lisinopril 20 mg daily.  - lisinopril (PRINIVIL) 20 MG Tab; Take 1 tablet by mouth every day for 90 days.  Dispense: 90 tablet; Refill: 3      No follow-ups on file.    Please note that this dictation was created using voice recognition software. I have made every reasonable attempt to correct obvious errors, but I expect that there are errors of grammar and possibly content that I did not discover before finalizing the note.

## 2021-05-18 ENCOUNTER — TELEPHONE (OUTPATIENT)
Dept: MEDICAL GROUP | Facility: PHYSICIAN GROUP | Age: 81
End: 2021-05-18

## 2021-05-18 DIAGNOSIS — I10 ESSENTIAL HYPERTENSION: ICD-10-CM

## 2021-05-18 RX ORDER — LISINOPRIL 20 MG/1
20 TABLET ORAL DAILY
Qty: 100 TABLET | Refills: 3 | Status: SHIPPED | OUTPATIENT
Start: 2021-05-18 | End: 2022-03-15 | Stop reason: SDUPTHER

## 2021-05-18 RX ORDER — LISINOPRIL 20 MG/1
20 TABLET ORAL DAILY
Qty: 90 TABLET | Refills: 3 | Status: CANCELLED | OUTPATIENT
Start: 2021-05-18 | End: 2021-08-16

## 2021-05-18 RX ORDER — LISINOPRIL 20 MG/1
20 TABLET ORAL DAILY
Qty: 100 TABLET | Refills: 3 | Status: SHIPPED | OUTPATIENT
Start: 2021-05-18 | End: 2021-05-18 | Stop reason: SDUPTHER

## 2021-05-18 NOTE — TELEPHONE ENCOUNTER
VOICEMAIL  1. Caller Name: isaiah                       Call Back Number: [phone]    2. Message: isaiah [facility] stating insurance only covers quantity of 100 for lisinopril, they need a new script for 100 quantity     3. Patient approves office to leave a detailed voicemail/MyChart message: yes

## 2021-08-06 ENCOUNTER — OFFICE VISIT (OUTPATIENT)
Dept: MEDICAL GROUP | Facility: PHYSICIAN GROUP | Age: 81
End: 2021-08-06
Payer: MEDICARE

## 2021-08-06 VITALS
OXYGEN SATURATION: 96 % | TEMPERATURE: 99.1 F | HEIGHT: 65 IN | HEART RATE: 69 BPM | BODY MASS INDEX: 21.73 KG/M2 | WEIGHT: 130.4 LBS | DIASTOLIC BLOOD PRESSURE: 80 MMHG | SYSTOLIC BLOOD PRESSURE: 134 MMHG

## 2021-08-06 DIAGNOSIS — M81.0 SENILE OSTEOPOROSIS: Chronic | ICD-10-CM

## 2021-08-06 DIAGNOSIS — S32.301A CLOSED DISPLACED FRACTURE OF RIGHT ILIUM, UNSPECIFIED FRACTURE MORPHOLOGY, INITIAL ENCOUNTER (HCC): ICD-10-CM

## 2021-08-06 PROBLEM — D72.829 LEUKOCYTOSIS: Status: RESOLVED | Noted: 2021-02-18 | Resolved: 2021-08-06

## 2021-08-06 PROCEDURE — 99214 OFFICE O/P EST MOD 30 MIN: CPT | Performed by: FAMILY MEDICINE

## 2021-08-06 PROCEDURE — 8041 PR SCP AHA: Performed by: FAMILY MEDICINE

## 2021-08-06 RX ORDER — ESTRADIOL 0.5 MG/1
0.5 TABLET ORAL
COMMUNITY
Start: 2021-06-13 | End: 2022-07-14

## 2021-08-06 RX ORDER — MEDROXYPROGESTERONE ACETATE 2.5 MG/1
TABLET ORAL
COMMUNITY
Start: 2021-06-13 | End: 2022-07-14

## 2021-08-06 ASSESSMENT — FIBROSIS 4 INDEX: FIB4 SCORE: 1.101195522578696476

## 2021-08-06 NOTE — ASSESSMENT & PLAN NOTE
Chronic issue  We've discussed risks and benefits of not being on medicine for this.  Would like to hold off on medicine  Taking calcium supplements

## 2021-08-06 NOTE — ASSESSMENT & PLAN NOTE
The patient is status post ground-level mechanical fall May 2021.  Found to have a right pelvic fracture and has recovered well without any surgery.  Reports doing better today.  Pain is well controlled with Norco

## 2021-08-06 NOTE — PROGRESS NOTES
"Subjective:     Chief Complaint   Patient presents with   • Follow-Up     getting better,        HPI:   Nafisa presents today to discuss the following.    Pelvic fracture (HCC)  The patient is status post ground-level mechanical fall May 2021.  Found to have a right pelvic fracture and has recovered well without any surgery.  Reports doing better today.  Pain is well controlled with Norco     Osteoporosis, senile  Chronic issue  We've discussed risks and benefits of not being on medicine for this.  Would like to hold off on medicine  Taking calcium supplements      Past Medical History:   Diagnosis Date   • Arthritis 04/16/2020    hands, feet   • CATARACT 2012    bilateral phaco with IOL   • Hypertension 04/16/2020   • MEDICAL HOME 11/07/12   • OSTEOPOROSIS 6/18/2010   • Pain     right shoulder       Current Outpatient Medications Ordered in Epic   Medication Sig Dispense Refill   • estradiol (ESTRACE) 0.5 MG tablet Take 0.5 mg by mouth every day.     • medroxyPROGESTERone (PROVERA) 2.5 MG Tab TAKE 1 TABLET BY MOUTH EVERY DAY WHILE USING ESTROGEN     • lisinopril (PRINIVIL) 20 MG Tab Take 1 tablet by mouth every day. 100 tablet 3   • HYDROcodone/acetaminophen (NORCO)  MG Tab Take 10 Tablets by mouth as needed.       No current Epic-ordered facility-administered medications on file.       Allergies:  Patient has no known allergies.    Health Maintenance: Completed    ROS:  Gen: no fevers/chills, no changes in weight  Eyes: no changes in vision  Pulm: no sob, no cough  CV: no chest pain, no palpitations  GI: no nausea/vomiting, no diarrhea      Objective:     Exam:  /80 (BP Location: Left arm, Patient Position: Sitting, BP Cuff Size: Adult)   Pulse 69   Temp 37.3 °C (99.1 °F) (Temporal)   Ht 1.651 m (5' 5\")   Wt 59.1 kg (130 lb 6.4 oz)   LMP 01/13/2017 (Exact Date) Comment: Due to hormone therapy to prevent cancer  SpO2 96%   BMI 21.70 kg/m²  Body mass index is 21.7 kg/m².        Constitutional: " Alert, no distress, well-groomed.  Skin: Warm, dry, good turgor, no rashes in visible areas.  Eye: Equal, round and reactive, conjunctiva clear, lids normal.  ENMT: Lips without lesions, good dentition, moist mucous membranes.  Neck: Trachea midline, no masses, no thyromegaly.  Respiratory: Unlabored respiratory effort, no cough.  MSK: Normal gait, moves all extremities.  Neuro: Grossly non-focal.   Psych: Alert and oriented x3, normal affect and mood.        Assessment & Plan:     80 y.o. female with the following -     1. Closed displaced fracture of right ilium, unspecified fracture morphology, initial encounter ([facility])  This is an established problem.  Status post right mechanical fall May 2021.  Suffered a right sided pelvic fracture.  Did not require operation.  He has been healing well on its own and the patient endorses improvement today.  Pain is well controlled with his current pain medication without the need for additional dosing.  -Continue to monitor    2. Osteoporosis, senile  Chronic, stable condition.  The patient has a history of osteoporosis she used to be on Prolia injections with history of intolerance.  I discussed the risks and benefits today and she would like to continue to hold off on being on osteoporosis medication.  Fall precautions recommended.      No follow-ups on file.    Please note that this dictation was created using voice recognition software. I have made every reasonable attempt to correct obvious errors, but I expect that there are errors of grammar and possibly content that I did not discover before finalizing the note.

## 2021-08-25 ENCOUNTER — TELEPHONE (OUTPATIENT)
Dept: HEALTH INFORMATION MANAGEMENT | Facility: OTHER | Age: 81
End: 2021-08-25

## 2021-08-25 NOTE — TELEPHONE ENCOUNTER
Outcome: Left Message     CALLED PT TO SCHEDULE KOLBY, LVM  Please transfer to Patient Outreach Team at [phone] when patient returns call.     Attempt # 1

## 2021-09-13 NOTE — TELEPHONE ENCOUNTER
Outcome: pt decline chalino   Please transfer to Patient Outreach Team at [phone] when patient returns call.      Attempt # 2

## 2022-01-26 ENCOUNTER — TELEPHONE (OUTPATIENT)
Dept: HEALTH INFORMATION MANAGEMENT | Facility: OTHER | Age: 82
End: 2022-01-26

## 2022-02-10 ENCOUNTER — OFFICE VISIT (OUTPATIENT)
Dept: MEDICAL GROUP | Facility: PHYSICIAN GROUP | Age: 82
End: 2022-02-10
Payer: MEDICARE

## 2022-02-10 ENCOUNTER — HOSPITAL ENCOUNTER (OUTPATIENT)
Dept: LAB | Facility: MEDICAL CENTER | Age: 82
End: 2022-02-10
Attending: FAMILY MEDICINE
Payer: MEDICARE

## 2022-02-10 VITALS
HEIGHT: 65 IN | DIASTOLIC BLOOD PRESSURE: 84 MMHG | TEMPERATURE: 98.8 F | BODY MASS INDEX: 20.86 KG/M2 | WEIGHT: 125.2 LBS | SYSTOLIC BLOOD PRESSURE: 152 MMHG | OXYGEN SATURATION: 94 % | HEART RATE: 86 BPM

## 2022-02-10 DIAGNOSIS — M19.90 ARTHRITIS: ICD-10-CM

## 2022-02-10 DIAGNOSIS — D64.9 ANEMIA, UNSPECIFIED TYPE: ICD-10-CM

## 2022-02-10 DIAGNOSIS — R53.82 CHRONIC FATIGUE: ICD-10-CM

## 2022-02-10 LAB
BASOPHILS # BLD AUTO: 0.4 % (ref 0–1.8)
BASOPHILS # BLD: 0.04 K/UL (ref 0–0.12)
CRP SERPL HS-MCNC: <0.3 MG/DL (ref 0–0.75)
EOSINOPHIL # BLD AUTO: 0.05 K/UL (ref 0–0.51)
EOSINOPHIL NFR BLD: 0.5 % (ref 0–6.9)
ERYTHROCYTE [DISTWIDTH] IN BLOOD BY AUTOMATED COUNT: 44.9 FL (ref 35.9–50)
ERYTHROCYTE [SEDIMENTATION RATE] IN BLOOD BY WESTERGREN METHOD: 4 MM/HOUR (ref 0–25)
FERRITIN SERPL-MCNC: 113 NG/ML (ref 10–291)
HCT VFR BLD AUTO: 43.1 % (ref 37–47)
HGB BLD-MCNC: 14.3 G/DL (ref 12–16)
IMM GRANULOCYTES # BLD AUTO: 0.03 K/UL (ref 0–0.11)
IMM GRANULOCYTES NFR BLD AUTO: 0.3 % (ref 0–0.9)
IRON SATN MFR SERPL: 21 % (ref 15–55)
IRON SERPL-MCNC: 69 UG/DL (ref 40–170)
LYMPHOCYTES # BLD AUTO: 1.94 K/UL (ref 1–4.8)
LYMPHOCYTES NFR BLD: 20.7 % (ref 22–41)
MCH RBC QN AUTO: 30.4 PG (ref 27–33)
MCHC RBC AUTO-ENTMCNC: 33.2 G/DL (ref 33.6–35)
MCV RBC AUTO: 91.5 FL (ref 81.4–97.8)
MONOCYTES # BLD AUTO: 0.5 K/UL (ref 0–0.85)
MONOCYTES NFR BLD AUTO: 5.3 % (ref 0–13.4)
NEUTROPHILS # BLD AUTO: 6.8 K/UL (ref 2–7.15)
NEUTROPHILS NFR BLD: 72.8 % (ref 44–72)
NRBC # BLD AUTO: 0 K/UL
NRBC BLD-RTO: 0 /100 WBC
PLATELET # BLD AUTO: 362 K/UL (ref 164–446)
PMV BLD AUTO: 9.9 FL (ref 9–12.9)
RBC # BLD AUTO: 4.71 M/UL (ref 4.2–5.4)
RHEUMATOID FACT SER IA-ACNC: <10 IU/ML (ref 0–14)
TIBC SERPL-MCNC: 328 UG/DL (ref 250–450)
TRANSFERRIN SERPL-MCNC: 283 MG/DL (ref 200–370)
TSH SERPL DL<=0.005 MIU/L-ACNC: 1.81 UIU/ML (ref 0.38–5.33)
UIBC SERPL-MCNC: 259 UG/DL (ref 110–370)
WBC # BLD AUTO: 9.4 K/UL (ref 4.8–10.8)

## 2022-02-10 PROCEDURE — 86200 CCP ANTIBODY: CPT

## 2022-02-10 PROCEDURE — 86140 C-REACTIVE PROTEIN: CPT

## 2022-02-10 PROCEDURE — 99214 OFFICE O/P EST MOD 30 MIN: CPT | Performed by: FAMILY MEDICINE

## 2022-02-10 PROCEDURE — 82728 ASSAY OF FERRITIN: CPT

## 2022-02-10 PROCEDURE — 83540 ASSAY OF IRON: CPT

## 2022-02-10 PROCEDURE — 84466 ASSAY OF TRANSFERRIN: CPT

## 2022-02-10 PROCEDURE — 85025 COMPLETE CBC W/AUTO DIFF WBC: CPT

## 2022-02-10 PROCEDURE — 84443 ASSAY THYROID STIM HORMONE: CPT

## 2022-02-10 PROCEDURE — 85652 RBC SED RATE AUTOMATED: CPT

## 2022-02-10 PROCEDURE — 36415 COLL VENOUS BLD VENIPUNCTURE: CPT

## 2022-02-10 PROCEDURE — 83550 IRON BINDING TEST: CPT

## 2022-02-10 PROCEDURE — 86431 RHEUMATOID FACTOR QUANT: CPT

## 2022-02-10 ASSESSMENT — PATIENT HEALTH QUESTIONNAIRE - PHQ9: CLINICAL INTERPRETATION OF PHQ2 SCORE: 0

## 2022-02-10 ASSESSMENT — FIBROSIS 4 INDEX: FIB4 SCORE: 1.11

## 2022-02-10 NOTE — PROGRESS NOTES
"Subjective:     Chief Complaint   Patient presents with   • Tired   • Referral Needed     Arthritis specialist,   • Medication Management     RA medication,        HPI:   Nafisa presents today to discuss the following.    Arthritis  Chronic issue  C/o general arthritis pains   Takes norco for this    Chronic fatigue  Chronic issue  She is sleeping well  She is unsure whether she snores or not  When she wakes up she may feel like she didn't sleep well        Past Medical History:   Diagnosis Date   • Arthritis 04/16/2020    hands, feet   • CATARACT 2012    bilateral phaco with IOL   • Hypertension 04/16/2020   • MEDICAL HOME 11/07/12   • OSTEOPOROSIS 6/18/2010   • Pain     right shoulder       Current Outpatient Medications Ordered in Epic   Medication Sig Dispense Refill   • estradiol (ESTRACE) 0.5 MG tablet Take 0.5 mg by mouth every day.     • medroxyPROGESTERone (PROVERA) 2.5 MG Tab TAKE 1 TABLET BY MOUTH EVERY DAY WHILE USING ESTROGEN     • lisinopril (PRINIVIL) 20 MG Tab Take 1 tablet by mouth every day. 100 tablet 3   • HYDROcodone/acetaminophen (NORCO)  MG Tab Take 10 Tablets by mouth as needed.       No current Epic-ordered facility-administered medications on file.       Allergies:  Patient has no known allergies.    Health Maintenance: Completed    ROS:  Gen: no fevers/chills, no changes in weight  Eyes: no changes in vision  Pulm: no sob, no cough  CV: no chest pain, no palpitations  GI: no nausea/vomiting, no diarrhea      Objective:     Exam:  /84 (BP Location: Right arm, Patient Position: Sitting, BP Cuff Size: Adult)   Pulse 86   Temp 37.1 °C (98.8 °F) (Temporal)   Ht 1.651 m (5' 5\")   Wt 56.8 kg (125 lb 3.2 oz)   LMP 01/13/2017 (Exact Date) Comment: Due to hormone therapy to prevent cancer  SpO2 94%   BMI 20.83 kg/m²  Body mass index is 20.83 kg/m².      Constitutional: Alert, no distress, well-groomed.  Skin: Warm, dry, good turgor, no rashes in visible areas.  Eye: Equal, round " and reactive, conjunctiva clear, lids normal.  ENMT: Lips without lesions, good dentition, moist mucous membranes.  Neck: Trachea midline, no masses, no thyromegaly.  Respiratory: Unlabored respiratory effort, no cough.  MSK: Normal gait, moves all extremities.  Neuro: Grossly non-focal.   Psych: Alert and oriented x3, normal affect and mood.        Assessment & Plan:     81 y.o. female with the following -     1. Arthritis  Chronic, unchanged condition.  The patient suffers from generalized arthritic pain.  She is on Norco to control the pain and managed by pain management.  I would like to repeat blood testing to get her screened for autoimmune disease.  She is amenable to plan.  - CCP-CYCLIC CITRULLINATED PEPTID; Future  - RHEUMATOID ARTHRITIS FACTOR; Future  - Sed Rate; Future  - CRP QUANTITIVE (NON-CARDIAC); Future    2. Chronic fatigue  3. Anemia, unspecified type   Chronic, unchanged condition.  The patient endorses a history of fatigue.  I am concerned for anemia.  I would like to order baseline blood work and rule out any other secondary causes such as thyroid problems.  - CBC WITH DIFFERENTIAL; Future  - TSH WITH REFLEX TO FT4; Future  - IRON/TOTAL IRON BIND; Future  - TRANSFERRIN; Future  - FERRITIN; Future      Return in about 3 months (around 5/10/2022).    Please note that this dictation was created using voice recognition software. I have made every reasonable attempt to correct obvious errors, but I expect that there are errors of grammar and possibly content that I did not discover before finalizing the note.

## 2022-02-10 NOTE — ASSESSMENT & PLAN NOTE
Chronic issue  She is sleeping well  She is unsure whether she snores or not  When she wakes up she may feel like she didn't sleep well

## 2022-02-12 LAB — CCP IGG SERPL-ACNC: 2 UNITS (ref 0–19)

## 2022-02-14 ENCOUNTER — TELEPHONE (OUTPATIENT)
Dept: MEDICAL GROUP | Facility: PHYSICIAN GROUP | Age: 82
End: 2022-02-14

## 2022-02-14 NOTE — TELEPHONE ENCOUNTER
----- Message from Rene Chang M.D. sent at 2/14/2022  2:04 PM PST -----  No autoimmune problems  Your thyroid function is also normal  Iron levels are normal

## 2022-02-14 NOTE — TELEPHONE ENCOUNTER
Phone Number Called: [phone]    Call outcome: Did not leave a detailed message. Requested patient to call back.    Message: Attempted to call pt, pt did not answer and mailbox is full. Will attempt to give results at a later time.

## 2022-02-14 NOTE — LETTER
February 15, 2022  -      Nafisa Whittier  [address] [place] [address]        Dear Nafisa:    We have tried to contact you via phone to discuss your recent lab results. However we were -not able to get in touch with you and your mailbox is full. Please call [phone] and ask to speak with Dr. Chang's medical assistant so we can give you your results. Thank you. Have a good day     Liana SOLO     If you have any questions or concerns, please don't hesitate to call.        Sincerely,        Rene Chang M.D.    Electronically Signed

## 2022-02-15 NOTE — TELEPHONE ENCOUNTER
Phone Number Called: [phone]    Call outcome: Did not leave a detailed message. Requested patient to call back.    Message: Attempted a second time to inform pt of lab results, pt did not answer and mailbox is full. If pt does not answer try three- will send letter

## 2022-02-17 ENCOUNTER — TELEPHONE (OUTPATIENT)
Dept: MEDICAL GROUP | Facility: PHYSICIAN GROUP | Age: 82
End: 2022-02-17
Payer: MEDICARE

## 2022-02-17 NOTE — TELEPHONE ENCOUNTER
VOICEMAIL  1. Caller Name: Nafisa Hancock                      Call Back Number: [phone]    2. Message: Patient LVM requesting a call back.    3. Patient approves office to leave a detailed voicemail/MyChart message: N\A

## 2022-02-17 NOTE — TELEPHONE ENCOUNTER
Phone Number Called: [phone]    Call outcome: Spoke to patient regarding message below.    Message: Spoke to Patient and Patient was requesting Lab results. Informed Patient of the Following results:  No autoimmune problems  Your thyroid function is also normal  Iron levels are normal    Patient acknowledged and would like to know if PCP would advise for any vitamins or medication for Thyroid?    Please advise.

## 2022-02-18 NOTE — TELEPHONE ENCOUNTER
Phone Number Called: [phone]     Call outcome: Did not leave a detailed message. Requested patient to call back.    Message: Unable to LVM VM box is full

## 2022-02-22 ENCOUNTER — TELEPHONE (OUTPATIENT)
Dept: MEDICAL GROUP | Facility: PHYSICIAN GROUP | Age: 82
End: 2022-02-22
Payer: MEDICARE

## 2022-02-22 NOTE — TELEPHONE ENCOUNTER
Phone Number Called: [phone] (home)     Call outcome: Did not leave a detailed message. Requested patient to call back.    Message: cb with questions

## 2022-02-22 NOTE — TELEPHONE ENCOUNTER
VOICEMAIL  1. Caller Name: Nafisa Hancock                        Call Back Number: [phone] (home)    2. Message: patient requesting call back

## 2022-03-15 DIAGNOSIS — I10 ESSENTIAL HYPERTENSION: ICD-10-CM

## 2022-03-15 RX ORDER — LISINOPRIL 20 MG/1
20 TABLET ORAL DAILY
Qty: 100 TABLET | Refills: 0 | Status: SHIPPED | OUTPATIENT
Start: 2022-03-15 | End: 2022-08-29 | Stop reason: SDUPTHER

## 2022-07-14 ENCOUNTER — OFFICE VISIT (OUTPATIENT)
Dept: MEDICAL GROUP | Facility: PHYSICIAN GROUP | Age: 82
End: 2022-07-14
Payer: MEDICARE

## 2022-07-14 VITALS
HEIGHT: 65 IN | DIASTOLIC BLOOD PRESSURE: 90 MMHG | SYSTOLIC BLOOD PRESSURE: 160 MMHG | OXYGEN SATURATION: 93 % | BODY MASS INDEX: 20.83 KG/M2 | HEART RATE: 72 BPM | TEMPERATURE: 98.2 F

## 2022-07-14 DIAGNOSIS — F11.20 UNCOMPLICATED OPIOID DEPENDENCE (HCC): ICD-10-CM

## 2022-07-14 PROCEDURE — 99213 OFFICE O/P EST LOW 20 MIN: CPT | Performed by: FAMILY MEDICINE

## 2022-07-14 ASSESSMENT — ACTIVITIES OF DAILY LIVING (ADL): BATHING_REQUIRES_ASSISTANCE: 0

## 2022-07-14 ASSESSMENT — PATIENT HEALTH QUESTIONNAIRE - PHQ9: CLINICAL INTERPRETATION OF PHQ2 SCORE: 0

## 2022-07-14 ASSESSMENT — ENCOUNTER SYMPTOMS: GENERAL WELL-BEING: GOOD

## 2022-07-14 NOTE — PROGRESS NOTES
No chief complaint on file.      HPI:  Nafisa is a 81 y.o. here for Medicare Annual Wellness Visit    Here for [facility] facility    Patient Active Problem List    Diagnosis Date Noted   • Chronic fatigue 02/10/2022   • Pelvic fracture (HCC) 02/18/2021   • Postmenopausal 02/05/2021   • Opiate dependence (HCC) 08/21/2019   • Dyslipidemia 11/15/2018   • HTN (hypertension) 02/23/2017   • Right shoulder pain 02/16/2017   • Arthritis 05/24/2011   • Osteoporosis, senile 06/18/2010       Current Outpatient Medications   Medication Sig Dispense Refill   • lisinopril (PRINIVIL) 20 MG Tab Take 1 Tablet by mouth every day. 100 Tablet 0   • estradiol (ESTRACE) 0.5 MG tablet Take 0.5 mg by mouth every day.     • medroxyPROGESTERone (PROVERA) 2.5 MG Tab TAKE 1 TABLET BY MOUTH EVERY DAY WHILE USING ESTROGEN     • HYDROcodone/acetaminophen (NORCO)  MG Tab Take 10 Tablets by mouth as needed.       No current facility-administered medications for this visit.        Patient is taking medications as noted in medication list.  Current supplements as per medication list.     Allergies: Patient has no known allergies.        Is patient current with immunizations? No, due for Patient is up to date on all vaccines. Patient is interested in receiving NONE today.    She  reports that she has never smoked. She has never used smokeless tobacco. She reports previous alcohol use. She reports that she does not use drugs.  Counseling given: Not Answered      DPA/Advanced directive: Patient has Advanced Directive, but it is not on file. Instructed to bring in a copy to scan into their chart.    ROS:    Gait: Uses a walker yes   Ostomy: No   Other tubes: No   Amputations: No   Chronic oxygen use No   Last eye exam 2021   Wears hearing aids: No  : Denies any urinary leakage during the last 6 months    Health Maintenance Summary          Overdue - Annual Wellness Visit (Every 366 Days) Overdue since 2/26/2021 02/26/2020  Subsequent  Annual Wellness Visit - Includes PPPS ()    08/29/2014  Done          Overdue - MAMMOGRAM (Yearly) Overdue since 9/17/2021 09/17/2020  MA-SCREENING MAMMO BILAT W/TOMOSYNTHESIS W/CAD    09/13/2019  MA-SCREENING MAMMO BILAT W/TOMOSYNTHESIS W/CAD    07/19/2018  MA-MAMMO SCREENING BILAT W/MARIA ISABEL W/CAD    07/18/2017  MA-MAMMO DIAGNOSTIC UNILAT W/MARIA ISABEL W/CAD RIGHT    06/26/2017  MA-MAMMO SCREENING BILAT W/MARIA ISABEL W/CAD    Only the first 5 history entries have been loaded, but more history exists.          Overdue - COVID-19 Vaccine (3 - Booster for Pfizer series) Overdue since 10/13/2021    05/13/2021  Imm Admin: PFIZER PURPLE CAP SARS-COV-2 VACCINATION (12+)    04/24/2021  Imm Admin: PFIZER PURPLE CAP SARS-COV-2 VACCINATION (12+)          Overdue - COLORECTAL CANCER SCREENING (COLONOSCOPY - Every 10 Years) Overdue since 6/15/2022    06/15/2012  COLONOSCOPY (Done)    05/16/2012  OCCULT BLOOD FECES IMMUNOASSAY    05/10/2012  COLONOSCOPY (Patient Declined)          Postponed - IMM ZOSTER VACCINES (2 of 3) Postponed until 11/13/2035 02/23/2016  Imm Admin: Zoster Vaccine Live (ZVL) (Zostavax) - HISTORICAL DATA          IMM INFLUENZA (1) Next due on 9/1/2022 09/30/2020  Imm Admin: Influenza Vaccine Adult HD    09/19/2019  Imm Admin: Influenza Vaccine Adult HD    09/27/2018  Imm Admin: Influenza Vaccine Adult HD    08/31/2017  Imm Admin: Influenza Vaccine Adult HD    09/30/2016  Imm Admin: Influenza Vaccine Adult HD    Only the first 5 history entries have been loaded, but more history exists.          BONE DENSITY (Every 5 Years) Next due on 9/13/2024 09/13/2019  DS-BONE DENSITY STUDY (DEXA)    10/30/2013  DS-BONE DENSITY STUDY (DEXA)    03/31/2008  DS-BONE DENSITY STUDY (DEXA)          IMM DTaP/Tdap/Td Vaccine (3 - Td or Tdap) Next due on 2/17/2031 02/17/2021  Imm Admin: Tdap Vaccine    05/10/2012  Imm Admin: Tdap Vaccine          IMM PNEUMOCOCCAL VACCINE: 65+ Years (Series Information) Completed    11/29/2016   Imm Admin: Pneumococcal polysaccharide vaccine (PPSV-23)    11/18/2015  Imm Admin: Pneumococcal Conjugate Vaccine (Prevnar/PCV-13)    05/24/2006  Imm Admin: Pneumococcal Vaccine (UF) - HISTORICAL DATA    05/24/2006  Imm Admin: Pneumococcal polysaccharide vaccine (PPSV-23)          IMM MENINGOCOCCAL VACCINE (MCV4) (Series Information) Aged Out    No completion history exists for this topic.          Discontinued - PAP SMEAR  Discontinued    12/12/2016  PATHOLOGY GYN SPECIMEN    12/07/2015  PATHOLOGY GYN SPECIMEN          Discontinued - IMM HEP B VACCINE  Discontinued    No completion history exists for this topic.                Patient Care Team:  Rene Chang M.D. as PCP - General (Family Medicine)  James Reyes M.D. as PCP - [facility] Paneled  Kaya Ingram as    Thomas Lucero M.D. (Obstetrics & Gynecology)  Marcelle Puente, PT, DPT as Transitional Care Navigator    Social History     Tobacco Use   • Smoking status: Never Smoker   • Smokeless tobacco: Never Used   Vaping Use   • Vaping Use: Never used   Substance Use Topics   • Alcohol use: Not Currently     Alcohol/week: 0.0 oz   • Drug use: No     Family History   Problem Relation Age of Onset   • Arthritis Mother    • Non-contributory Neg Hx      She  has a past medical history of Arthritis (04/16/2020), CATARACT (2012), Hypertension (04/16/2020), MEDICAL HOME (11/07/12), OSTEOPOROSIS (6/18/2010), and Pain.   Past Surgical History:   Procedure Laterality Date   • DILATION AND CURETTAGE N/A 4/20/2020    Procedure: DILATION AND CURETTAGE;  Surgeon: Thomas Lucero M.D.;  Location: SURGERY [facility];  Service: Gynecology   • SHOULDER ARTHROSCOPY W/ ROTATOR CUFF REPAIR Right 3/16/2017    Procedure: SHOULDER ARTHROSCOPY W/ ROTATOR CUFF REPAIR;  Surgeon: James Meza M.D.;  Location: SURGERY [facility];  Service:    • SHOULDER DECOMPRESSION ARTHROSCOPIC  3/16/2017    Procedure: SHOULDER DECOMPRESSION ARTHROSCOPIC -  "SUBACROMIAL, CUEVAS;  Surgeon: Darleen Meza M.D.;  Location: SURGERY [facility];  Service:    • CATARACT PHACO WITH IOL  7/25/2012    Performed by TEJ DOUGLAS at SURGERY SAME DAY [facility]   • CATARACT PHACO WITH IOL  7/11/2012    Performed by TEJ DOUGLAS at SURGERY SAME DAY [facility] ORS   • COLONOSCOPY  2012   • SHOULDER DECOMPRESSION ARTHROSCOPIC  10/1/2009    Performed by DARLEEN MEZA at SURGERY [facility]   • SHOULDER ARTHROSCOPY W/ ROTATOR CUFF REPAIR  10/1/2009    Performed by DARLEEN MEZA at SURGERY [facility]   • BREAST BIOPSY Left 2003    negative         Exam:   BP (!) 160/90 (BP Location: Left arm, Patient Position: Sitting, BP Cuff Size: Adult)   Pulse 72   Temp 36.8 °C (98.2 °F) (Temporal)   Ht 1.651 m (5' 5\")   SpO2 93%  Body mass index is 20.83 kg/m².    Hearing good.    Dentition fair  Alert, oriented in no acute distress  Eye contact is good, speech goal directed, affect calm    There are no diagnoses linked to this encounter.     Services suggested: No services needed at this time  Health Care Screening recommendations as per orders if indicated.  Referrals offered: PT/OT/Nutrition counseling/Behavioral Health/Smoking cessation as per orders if indicated.    Discussion today about general wellness and lifestyle habits:    · Prevent falls and reduce trip hazards; Cautioned about securing or removing rugs.  · Have a working fire alarm and carbon monoxide detector;   · Engage in regular physical activity and social activities.     Follow-up: No follow-ups on file.  "

## 2022-08-15 PROBLEM — K59.03 DRUG-INDUCED CONSTIPATION: Status: ACTIVE | Noted: 2022-08-12

## 2022-08-15 PROBLEM — R53.1 WEAKNESS: Status: ACTIVE | Noted: 2022-08-12

## 2022-08-15 PROBLEM — M25.512 BILATERAL SHOULDER PAIN: Status: ACTIVE | Noted: 2017-02-16

## 2022-08-15 PROBLEM — R64 CACHEXIA (HCC): Status: ACTIVE | Noted: 2022-08-12

## 2022-08-15 PROBLEM — M21.371 RIGHT FOOT DROP: Status: ACTIVE | Noted: 2022-08-12

## 2022-09-08 ENCOUNTER — HOME HEALTH ADMISSION (OUTPATIENT)
Dept: HOME HEALTH SERVICES | Facility: HOME HEALTHCARE | Age: 82
End: 2022-09-08
Payer: MEDICARE

## 2022-09-11 ENCOUNTER — HOME CARE VISIT (OUTPATIENT)
Dept: HOME HEALTH SERVICES | Facility: HOME HEALTHCARE | Age: 82
End: 2022-09-11

## 2022-09-11 NOTE — Clinical Note
Patient refusing home health. Stated she does not want PT or OT or any home health at this time.     Thank you,    Michael Black RN  [phone]

## 2022-09-12 NOTE — CASE COMMUNICATION
noted  ----- Message -----  From: Alton Black R.N.  Sent: 9/11/2022  11:21 AM PDT  To: Marley Alejandre R.N., Nichelle Cullen R.N., *      Patient refusing home health. Stated she does not want PT or OT or any home health at this time.     Thank you,    Michael Black RN  [phone]

## 2022-10-06 NOTE — IP AVS SNAPSHOT
" Home Care Instructions                                                                                                                  Name:Nafisa Hancock  Medical Record Number:[id number]  CSN: [id number]    YOB: 1940   Age: 76 y.o.  Sex: female  HT:1.651 m (5' 5\") WT: 63 kg (138 lb 14.2 oz)          Admit Date: 2/16/2017     Discharge Date:   Today's Date: 2/18/2017  Attending Doctor:  Magdiel Lopez M.D.                  Allergies:  Nkda            Discharge Instructions       Discharge Instructions    Discharged to home by car with relative. Discharged via wheelchair, hospital escort: Yes.  Special equipment needed: Not Applicable    Be sure to schedule a follow-up appointment with your primary care doctor or any specialists as instructed.     Discharge Plan:   Diet Plan: Discussed  Activity Level: Discussed  Confirmed Follow up Appointment: Patient to Call and Schedule Appointment  Confirmed Symptoms Management: Discussed  Medication Reconciliation Updated: Yes  Influenza Vaccine Indication: Not indicated: Previously immunized this influenza season and > 8 years of age    I understand that a diet low in cholesterol, fat, and sodium is recommended for good health. Unless I have been given specific instructions below for another diet, I accept this instruction as my diet prescription.   Other diet: regular    Special Instructions: Hypertension  Hypertension, commonly called high blood pressure, is when the force of blood pumping through your arteries is too strong. Your arteries are the blood vessels that carry blood from your heart throughout your body. A blood pressure reading consists of a higher number over a lower number, such as 110/72. The higher number (systolic) is the pressure inside your arteries when your heart pumps. The lower number (diastolic) is the pressure inside your arteries when your heart relaxes. Ideally you want your blood pressure below 120/80.  Hypertension forces your " Care Management Discharge Note    Discharge Date: 10/07/2022       [facility]charge Disposition: [facility]    Discharge Services:  None     Discharge DME:  None     Discharge Transportation: [facility] stretcher transport arranged for 1530 due to O2 needs.     Confirmed with admissions that we do not need to send an LVAD cart as they have an LVAD cart over there.     Private pay costs discussed: Not applicable    PAS Confirmation Code:  [id number]  Patient/family educated on Medicare website which has current facility and service quality ratings:  No pt has an LVAD-has limited options    Education Provided on the Discharge Plan:  Discharge Plan   Persons Notified of Discharge Plans: Primary Team, bedside RN, pt and left msg for pt's Jasmyn sidhu ([phone]) regarding discharge time and location.  Patient/Family in Agreement with the Plan:  Yes     Handoff Referral Completed: No    Additional Information:  Pt discharging to [facility] today at 1530. [facility] requiring booster or else pt has to quarantine at TCU. Pt wants to get the booster. Discussed with pharmacy and they will arrange this with bedside RN prior to discharge.         Crystal Clark, KLAUDIASW         heart to work harder to pump blood. Your arteries may become narrow or stiff. Having untreated or uncontrolled hypertension can cause heart attack, stroke, kidney disease, and other problems.  RISK FACTORS  Some risk factors for high blood pressure are controllable. Others are not.   Risk factors you cannot control include:   · Race. You may be at higher risk if you are .  · Age. Risk increases with age.  · Gender. Men are at higher risk than women before age 45 years. After age 65, women are at higher risk than men.  Risk factors you can control include:  · Not getting enough exercise or physical activity.  · Being overweight.  · Getting too much fat, sugar, calories, or salt in your diet.  · Drinking too much alcohol.  SIGNS AND SYMPTOMS  Hypertension does not usually cause signs or symptoms. Extremely high blood pressure (hypertensive crisis) may cause headache, anxiety, shortness of breath, and nosebleed.  DIAGNOSIS  To check if you have hypertension, your health care provider will measure your blood pressure while you are seated, with your arm held at the level of your heart. It should be measured at least twice using the same arm. Certain conditions can cause a difference in blood pressure between your right and left arms. A blood pressure reading that is higher than normal on one occasion does not mean that you need treatment. If it is not clear whether you have high blood pressure, you may be asked to return on a different day to have your blood pressure checked again. Or, you may be asked to monitor your blood pressure at home for 1 or more weeks.  TREATMENT  Treating high blood pressure includes making lifestyle changes and possibly taking medicine. Living a healthy lifestyle can help lower high blood pressure. You may need to change some of your habits.  Lifestyle changes may include:  · Following the DASH diet. This diet is high in fruits, vegetables, and whole grains. It is low in salt,  red meat, and added sugars.  · Keep your sodium intake below 2,300 mg per day.  · Getting at least 30-45 minutes of aerobic exercise at least 4 times per week.  · Losing weight if necessary.  · Not smoking.  · Limiting alcoholic beverages.  · Learning ways to reduce stress.  Your health care provider may prescribe medicine if lifestyle changes are not enough to get your blood pressure under control, and if one of the following is true:  · You are 18-59 years of age and your systolic blood pressure is above 140.  · You are 60 years of age or older, and your systolic blood pressure is above 150.  · Your diastolic blood pressure is above 90.  · You have diabetes, and your systolic blood pressure is over 140 or your diastolic blood pressure is over 90.  · You have kidney disease and your blood pressure is above 140/90.  · You have heart disease and your blood pressure is above 140/90.  Your personal target blood pressure may vary depending on your medical conditions, your age, and other factors.  HOME CARE INSTRUCTIONS  · Have your blood pressure rechecked as directed by your health care provider.    · Take medicines only as directed by your health care provider. Follow the directions carefully. Blood pressure medicines must be taken as prescribed. The medicine does not work as well when you skip doses. Skipping doses also puts you at risk for problems.  · Do not smoke.    · Monitor your blood pressure at home as directed by your health care provider.   SEEK MEDICAL CARE IF:   · You think you are having a reaction to medicines taken.  · You have recurrent headaches or feel dizzy.  · You have swelling in your ankles.  · You have trouble with your vision.  SEEK IMMEDIATE MEDICAL CARE IF:  · You develop a severe headache or confusion.  · You have unusual weakness, numbness, or feel faint.  · You have severe chest or abdominal pain.  · You vomit repeatedly.  · You have trouble breathing.  MAKE SURE YOU:   · Understand these  instructions.  · Will watch your condition.  · Will get help right away if you are not doing well or get worse.     This information is not intended to replace advice given to you by your health care provider. Make sure you discuss any questions you have with your health care provider.     Document Released: 12/18/2006 Document Revised: 05/03/2016 Document Reviewed: 10/10/2014  [employer] Interactive Patient Education ©2016 [employer]      · Is patient discharged on Warfarin / Coumadin?   No     · Is patient Post Blood Transfusion?  No    Depression / Suicide Risk    As you are discharged from this [facility] facility, it is important to learn how to keep safe from harming yourself.    Recognize the warning signs:  · Abrupt changes in personality, positive or negative- including increase in energy   · Giving away possessions  · Change in eating patterns- significant weight changes-  positive or negative  · Change in sleeping patterns- unable to sleep or sleeping all the time   · Unwillingness or inability to communicate  · Depression  · Unusual sadness, discouragement and loneliness  · Talk of wanting to die  · Neglect of personal appearance   · Rebelliousness- reckless behavior  · Withdrawal from people/activities they love  · Confusion- inability to concentrate     If you or a loved one observes any of these behaviors or has concerns about self-harm, here's what you can do:  · Talk about it- your feelings and reasons for harming yourself  · Remove any means that you might use to hurt yourself (examples: pills, rope, extension cords, firearm)  · Get professional help from the community (Mental Health, Substance Abuse, psychological counseling)  · Do not be alone:Call your Safe Contact- someone whom you trust who will be there for you.  · Call your local CRISIS HOTLINE 096-8817 or 835-829-7783  · Call your local Children's Mobile Crisis Response Team [place] (145) 353-2133 or www.[employer]  · Call  the toll free National Suicide Prevention Hotlines   · National Suicide Prevention Lifeline 343-793-NAMH (6849)  · National Hope Line Network 800-SUICIDE (316-5716)        Your appointments     Feb 23, 2017  1:40 PM   Established Patient with James Reyes M.D.   [facility] [address]    [address]  [address]  [place] [address]   [phone]           You will be receiving a confirmation call a few days before your appointment from our automated call confirmation system.                 Discharge Medication Instructions:    Below are the medications your physician expects you to take upon discharge:    Review all your home medications and newly ordered medications with your doctor and/or pharmacist. Follow medication instructions as directed by your doctor and/or pharmacist.    Please keep your medication list with you and share with your physician.               Medication List      START taking these medications        Instructions    hydrochlorothiazide 12.5 MG tablet   Last time this was given:  12.5 mg on 2/18/2017  8:39 AM   Commonly known as:  HYDRODIURIL    Take 1 Tab by mouth every day.   Dose:  12.5 mg         CHANGE how you take these medications        Instructions    lisinopril 40 MG tablet   What changed:    - medication strength  - how much to take   Last time this was given:  40 mg on 2/18/2017  8:39 AM   Commonly known as:  PRINIVIL, ZESTRIL    Take 1 Tab by mouth every day.   Dose:  40 mg         CONTINUE taking these medications        Instructions    CALCIUM + D PO    Take 1,200 mg by mouth every day.   Dose:  1200 mg       medroxyPROGESTERone 5 MG Tabs   Commonly known as:  PROVERA    Take 2.5 mg by mouth every day. Daily x10d per month   Dose:  2.5 mg       MULTIVITAMINS PO    Take  by mouth every day.       PREMARIN 0.45 MG Tabs   Generic drug:  Estrogens Conjugated    Take  by mouth every day.       VITAMIN B-12 PO    Take 500 mcg by mouth every day.   Dose:  500  mcg         STOP taking these medications     ibuprofen 200 MG Tabs   Commonly known as:  MOTRIN               Instructions           Diet / Nutrition:    Follow any diet instructions given to you by your doctor or the dietician, including how much salt (sodium) you are allowed each day.    If you are overweight, talk to your doctor about a weight reduction plan.    Activity:    Remain physically active following your doctor's instructions about exercise and activity.    Rest often.     Any time you become even a little tired or short of breath, SIT DOWN and rest.    Worsening Symptoms:    Report any of the following signs and symptoms to the doctor's office immediately:    *Pain of jaw, arm, or neck  *Chest pain not relieved by medication                               *Dizziness or loss of consciousness  *Difficulty breathing even when at rest   *More tired than usual                                       *Bleeding drainage or swelling of surgical site  *Swelling of feet, ankles, legs or stomach                 *Fever (>100ºF)  *Pink or blood tinged sputum  *Weight gain (3lbs/day or 5lbs /week)           *Shock from internal defibrillator (if applicable)  *Palpitations or irregular heartbeats                *Cool and/or numb extremities    Stroke Awareness    Common Risk Factors for Stroke include:    Age  Atrial Fibrillation  Carotid Artery Stenosis  Diabetes Mellitus  Excessive alcohol consumption  High blood pressure  Overweight   Physical inactivity  Smoking    Warning signs and symptoms of a stroke include:    *Sudden numbness or weakness of the face, arm or leg (especially on one side of the body).  *Sudden confusion, trouble speaking or understanding.  *Sudden trouble seeing in one or both eyes.  *Sudden trouble walking, dizziness, loss of balance or coordination.Sudden severe headache with no known cause.    It is very important to get treatment quickly when a stroke occurs. If you experience any of the  above warning signs, call 701 immediately.                   Disclaimer         Quit Smoking / Tobacco Use:    I understand the use of any tobacco products increases my chance of suffering from future heart disease or stroke and could cause other illnesses which may shorten my life. Quitting the use of tobacco products is the single most important thing I can do to improve my health. For further information on smoking / tobacco cessation call a Toll Free Quit Line at 1-873.897.4834 (*[employer]) or 1-744.358.5121 (American Lung Association) or you can access the web based program at www.lungusa.org.    [place] Tobacco Users Help Line:  (974) 158-8928       Toll Free: 1-668.681.6434  Quit Tobacco Program [facility] (686)527-5763    Crisis Hotline:    [place] Crisis Hotline:  9-017-QYKCXXC or 1-619.249.6778    [place] Crisis Hotline:    1-357.922.8529 or 352-546-1355    Discharge Survey:   Thank you for choosing [facility]. We hope we did everything we could to make your hospital stay a pleasant one. You may be receiving a phone survey and we would appreciate your time and participation in answering the questions. Your input is very valuable to us in our efforts to improve our service to our patients and their families.        My signature on this form indicates that:    1. I have reviewed and understand the above information.  2. My questions regarding this information have been answered to my satisfaction.  3. I have formulated a plan with my discharge nurse to obtain my prescribed medications for home.                  Disclaimer         __________________________________                     __________       ________                       Patient Signature                                                 Date                    Time

## 2022-11-03 ENCOUNTER — DOCUMENTATION (OUTPATIENT)
Dept: HEALTH INFORMATION MANAGEMENT | Facility: OTHER | Age: 82
End: 2022-11-03
Payer: MEDICARE

## 2023-02-20 PROBLEM — Z78.0 POSTMENOPAUSAL: Status: RESOLVED | Noted: 2021-02-05 | Resolved: 2023-02-20

## 2023-04-06 ENCOUNTER — HOSPITAL ENCOUNTER (OUTPATIENT)
Facility: MEDICAL CENTER | Age: 83
End: 2023-04-06
Attending: NURSE PRACTITIONER
Payer: MEDICARE

## 2023-04-06 DIAGNOSIS — E03.9 HYPOTHYROIDISM, UNSPECIFIED TYPE: ICD-10-CM

## 2023-04-06 DIAGNOSIS — I10 PRIMARY HYPERTENSION: ICD-10-CM

## 2023-04-06 DIAGNOSIS — E53.8 VITAMIN B12 DEFICIENCY: ICD-10-CM

## 2023-04-06 DIAGNOSIS — E55.9 VITAMIN D DEFICIENCY: ICD-10-CM

## 2023-04-06 LAB
BASOPHILS # BLD AUTO: 0.7 % (ref 0–1.8)
BASOPHILS # BLD: 0.05 K/UL (ref 0–0.12)
EOSINOPHIL # BLD AUTO: 0.07 K/UL (ref 0–0.51)
EOSINOPHIL NFR BLD: 1 % (ref 0–6.9)
ERYTHROCYTE [DISTWIDTH] IN BLOOD BY AUTOMATED COUNT: 45 FL (ref 35.9–50)
HCT VFR BLD AUTO: 40.9 % (ref 37–47)
HGB BLD-MCNC: 13.2 G/DL (ref 12–16)
IMM GRANULOCYTES # BLD AUTO: 0.02 K/UL (ref 0–0.11)
IMM GRANULOCYTES NFR BLD AUTO: 0.3 % (ref 0–0.9)
LYMPHOCYTES # BLD AUTO: 1.78 K/UL (ref 1–4.8)
LYMPHOCYTES NFR BLD: 26.3 % (ref 22–41)
MCH RBC QN AUTO: 29.1 PG (ref 27–33)
MCHC RBC AUTO-ENTMCNC: 32.3 G/DL (ref 33.6–35)
MCV RBC AUTO: 90.1 FL (ref 81.4–97.8)
MONOCYTES # BLD AUTO: 0.5 K/UL (ref 0–0.85)
MONOCYTES NFR BLD AUTO: 7.4 % (ref 0–13.4)
NEUTROPHILS # BLD AUTO: 4.36 K/UL (ref 2–7.15)
NEUTROPHILS NFR BLD: 64.3 % (ref 44–72)
NRBC # BLD AUTO: 0 K/UL
NRBC BLD-RTO: 0 /100 WBC
PLATELET # BLD AUTO: 329 K/UL (ref 164–446)
PMV BLD AUTO: 10.7 FL (ref 9–12.9)
RBC # BLD AUTO: 4.54 M/UL (ref 4.2–5.4)
WBC # BLD AUTO: 6.8 K/UL (ref 4.8–10.8)

## 2023-04-06 PROCEDURE — 82607 VITAMIN B-12: CPT

## 2023-04-06 PROCEDURE — 82306 VITAMIN D 25 HYDROXY: CPT

## 2023-04-06 PROCEDURE — 80053 COMPREHEN METABOLIC PANEL: CPT

## 2023-04-06 PROCEDURE — 84480 ASSAY TRIIODOTHYRONINE (T3): CPT

## 2023-04-06 PROCEDURE — 85025 COMPLETE CBC W/AUTO DIFF WBC: CPT

## 2023-04-06 PROCEDURE — 84443 ASSAY THYROID STIM HORMONE: CPT

## 2023-04-07 LAB
25(OH)D3 SERPL-MCNC: 66 NG/ML (ref 30–100)
ALBUMIN SERPL BCP-MCNC: 4.6 G/DL (ref 3.2–4.9)
ALBUMIN/GLOB SERPL: 1.7 G/DL
ALP SERPL-CCNC: 69 U/L (ref 30–99)
ALT SERPL-CCNC: 6 U/L (ref 2–50)
ANION GAP SERPL CALC-SCNC: 12 MMOL/L (ref 7–16)
AST SERPL-CCNC: 12 U/L (ref 12–45)
BILIRUB SERPL-MCNC: 0.4 MG/DL (ref 0.1–1.5)
BUN SERPL-MCNC: 12 MG/DL (ref 8–22)
CALCIUM ALBUM COR SERPL-MCNC: 8.9 MG/DL (ref 8.5–10.5)
CALCIUM SERPL-MCNC: 9.4 MG/DL (ref 8.5–10.5)
CHLORIDE SERPL-SCNC: 101 MMOL/L (ref 96–112)
CO2 SERPL-SCNC: 26 MMOL/L (ref 20–33)
CREAT SERPL-MCNC: 0.68 MG/DL (ref 0.5–1.4)
GFR SERPLBLD CREATININE-BSD FMLA CKD-EPI: 87 ML/MIN/1.73 M 2
GLOBULIN SER CALC-MCNC: 2.7 G/DL (ref 1.9–3.5)
GLUCOSE SERPL-MCNC: 94 MG/DL (ref 65–99)
POTASSIUM SERPL-SCNC: 3.6 MMOL/L (ref 3.6–5.5)
PROT SERPL-MCNC: 7.3 G/DL (ref 6–8.2)
SODIUM SERPL-SCNC: 139 MMOL/L (ref 135–145)
T3 SERPL-MCNC: 119 NG/DL (ref 60–181)
TSH SERPL DL<=0.005 MIU/L-ACNC: 1.17 UIU/ML (ref 0.38–5.33)
VIT B12 SERPL-MCNC: 2949 PG/ML (ref 211–911)

## 2023-04-26 PROBLEM — G89.29 CHRONIC PAIN: Status: ACTIVE | Noted: 2023-04-26

## 2023-06-29 PROBLEM — R63.4 WEIGHT LOSS: Status: ACTIVE | Noted: 2023-06-29

## 2023-08-04 PROBLEM — Z91.81 AT RISK FOR FALLS: Status: ACTIVE | Noted: 2023-08-04

## 2024-01-08 PROBLEM — G89.4 CHRONIC PAIN SYNDROME: Status: ACTIVE | Noted: 2023-04-26

## 2024-01-08 PROBLEM — F11.24 OPIOID DEPENDENCE WITH OPIOID-INDUCED MOOD DISORDER (HCC): Chronic | Status: ACTIVE | Noted: 2019-08-21

## 2024-01-08 PROBLEM — I50.30 HYPERTENSIVE HEART DISEASE WITH CONGESTIVE HEART FAILURE WITH PRESERVED LEFT VENTRICULAR FUNCTION (HCC): Chronic | Status: ACTIVE | Noted: 2024-01-08

## 2024-01-08 PROBLEM — K59.03 DRUG-INDUCED CONSTIPATION: Chronic | Status: ACTIVE | Noted: 2022-08-12

## 2024-01-08 PROBLEM — F11.29 OPIOID DEPENDENCE WITH OPIOID-INDUCED DISORDER (HCC): Status: ACTIVE | Noted: 2019-08-21

## 2024-01-08 PROBLEM — I50.30 HYPERTENSIVE HEART DISEASE WITH CONGESTIVE HEART FAILURE WITH PRESERVED LEFT VENTRICULAR FUNCTION (HCC): Status: ACTIVE | Noted: 2024-01-08

## 2024-01-08 PROBLEM — R64 CACHEXIA (HCC): Chronic | Status: ACTIVE | Noted: 2022-08-12

## 2024-01-08 PROBLEM — S32.9XXA PELVIC FRACTURE (HCC): Chronic | Status: ACTIVE | Noted: 2021-02-18

## 2024-01-08 PROBLEM — F11.24 OPIOID DEPENDENCE WITH OPIOID-INDUCED MOOD DISORDER (HCC): Status: ACTIVE | Noted: 2019-08-21

## 2024-01-08 PROBLEM — I11.0 HYPERTENSIVE HEART DISEASE WITH CONGESTIVE HEART FAILURE WITH PRESERVED LEFT VENTRICULAR FUNCTION (HCC): Status: ACTIVE | Noted: 2024-01-08

## 2024-01-08 PROBLEM — I11.0 HYPERTENSIVE HEART DISEASE WITH CONGESTIVE HEART FAILURE WITH PRESERVED LEFT VENTRICULAR FUNCTION (HCC): Chronic | Status: ACTIVE | Noted: 2024-01-08

## 2024-01-18 ENCOUNTER — HOSPITAL ENCOUNTER (OUTPATIENT)
Dept: LAB | Facility: MEDICAL CENTER | Age: 84
End: 2024-01-18
Attending: NURSE PRACTITIONER
Payer: MEDICARE

## 2024-01-18 DIAGNOSIS — I50.30 HYPERTENSIVE HEART DISEASE WITH CONGESTIVE HEART FAILURE WITH PRESERVED LEFT VENTRICULAR FUNCTION (HCC): Chronic | ICD-10-CM

## 2024-01-18 DIAGNOSIS — Z79.899 HIGH RISK MEDICATION USE: ICD-10-CM

## 2024-01-18 DIAGNOSIS — R63.4 WEIGHT LOSS: ICD-10-CM

## 2024-01-18 DIAGNOSIS — I10 ESSENTIAL HYPERTENSION: ICD-10-CM

## 2024-01-18 DIAGNOSIS — I11.0 HYPERTENSIVE HEART DISEASE WITH CONGESTIVE HEART FAILURE WITH PRESERVED LEFT VENTRICULAR FUNCTION (HCC): Chronic | ICD-10-CM

## 2024-01-18 DIAGNOSIS — E78.5 DYSLIPIDEMIA: ICD-10-CM

## 2024-01-18 DIAGNOSIS — R53.82 CHRONIC FATIGUE: ICD-10-CM

## 2024-01-18 DIAGNOSIS — R64 CACHEXIA (HCC): Chronic | ICD-10-CM

## 2024-01-18 LAB
APPEARANCE UR: ABNORMAL
BACTERIA #/AREA URNS HPF: ABNORMAL /HPF
BASOPHILS # BLD AUTO: 0.5 % (ref 0–1.8)
BASOPHILS # BLD: 0.06 K/UL (ref 0–0.12)
BILIRUB UR QL STRIP.AUTO: NEGATIVE
COLOR UR: YELLOW
EOSINOPHIL # BLD AUTO: 0.06 K/UL (ref 0–0.51)
EOSINOPHIL NFR BLD: 0.5 % (ref 0–6.9)
EPI CELLS #/AREA URNS HPF: NEGATIVE /HPF
ERYTHROCYTE [DISTWIDTH] IN BLOOD BY AUTOMATED COUNT: 44.9 FL (ref 35.9–50)
EST. AVERAGE GLUCOSE BLD GHB EST-MCNC: 120 MG/DL
GLUCOSE UR STRIP.AUTO-MCNC: NEGATIVE MG/DL
HBA1C MFR BLD: 5.8 % (ref 4–5.6)
HCT VFR BLD AUTO: 42.4 % (ref 37–47)
HGB BLD-MCNC: 13.9 G/DL (ref 12–16)
HYALINE CASTS #/AREA URNS LPF: ABNORMAL /LPF
IMM GRANULOCYTES # BLD AUTO: 0.04 K/UL (ref 0–0.11)
IMM GRANULOCYTES NFR BLD AUTO: 0.4 % (ref 0–0.9)
KETONES UR STRIP.AUTO-MCNC: NEGATIVE MG/DL
LEUKOCYTE ESTERASE UR QL STRIP.AUTO: ABNORMAL
LYMPHOCYTES # BLD AUTO: 1.96 K/UL (ref 1–4.8)
LYMPHOCYTES NFR BLD: 17.8 % (ref 22–41)
MCH RBC QN AUTO: 30.9 PG (ref 27–33)
MCHC RBC AUTO-ENTMCNC: 32.8 G/DL (ref 32.2–35.5)
MCV RBC AUTO: 94.2 FL (ref 81.4–97.8)
MICRO URNS: ABNORMAL
MONOCYTES # BLD AUTO: 0.53 K/UL (ref 0–0.85)
MONOCYTES NFR BLD AUTO: 4.8 % (ref 0–13.4)
NEUTROPHILS # BLD AUTO: 8.37 K/UL (ref 1.82–7.42)
NEUTROPHILS NFR BLD: 76 % (ref 44–72)
NITRITE UR QL STRIP.AUTO: POSITIVE
NRBC # BLD AUTO: 0 K/UL
NRBC BLD-RTO: 0 /100 WBC (ref 0–0.2)
PH UR STRIP.AUTO: 6.5 [PH] (ref 5–8)
PLATELET # BLD AUTO: 372 K/UL (ref 164–446)
PMV BLD AUTO: 10.6 FL (ref 9–12.9)
PROT UR QL STRIP: NEGATIVE MG/DL
RBC # BLD AUTO: 4.5 M/UL (ref 4.2–5.4)
RBC # URNS HPF: ABNORMAL /HPF
RBC UR QL AUTO: NEGATIVE
SP GR UR STRIP.AUTO: 1.02
UROBILINOGEN UR STRIP.AUTO-MCNC: 0.2 MG/DL
WBC # BLD AUTO: 11 K/UL (ref 4.8–10.8)
WBC #/AREA URNS HPF: ABNORMAL /HPF

## 2024-01-18 PROCEDURE — 84443 ASSAY THYROID STIM HORMONE: CPT

## 2024-01-18 PROCEDURE — 80053 COMPREHEN METABOLIC PANEL: CPT

## 2024-01-18 PROCEDURE — 82607 VITAMIN B-12: CPT

## 2024-01-18 PROCEDURE — 85025 COMPLETE CBC W/AUTO DIFF WBC: CPT

## 2024-01-18 PROCEDURE — 87077 CULTURE AEROBIC IDENTIFY: CPT

## 2024-01-18 PROCEDURE — 83880 ASSAY OF NATRIURETIC PEPTIDE: CPT

## 2024-01-18 PROCEDURE — 82306 VITAMIN D 25 HYDROXY: CPT

## 2024-01-18 PROCEDURE — 36415 COLL VENOUS BLD VENIPUNCTURE: CPT

## 2024-01-18 PROCEDURE — 87186 SC STD MICRODIL/AGAR DIL: CPT

## 2024-01-18 PROCEDURE — 81001 URINALYSIS AUTO W/SCOPE: CPT

## 2024-01-18 PROCEDURE — 87086 URINE CULTURE/COLONY COUNT: CPT

## 2024-01-18 PROCEDURE — 83036 HEMOGLOBIN GLYCOSYLATED A1C: CPT

## 2024-01-18 PROCEDURE — 80061 LIPID PANEL: CPT

## 2024-01-18 PROCEDURE — 82746 ASSAY OF FOLIC ACID SERUM: CPT

## 2024-01-19 LAB
25(OH)D3 SERPL-MCNC: 100 NG/ML (ref 30–100)
ALBUMIN SERPL BCP-MCNC: 4.6 G/DL (ref 3.2–4.9)
ALBUMIN/GLOB SERPL: 1.6 G/DL
ALP SERPL-CCNC: 85 U/L (ref 30–99)
ALT SERPL-CCNC: 15 U/L (ref 2–50)
ANION GAP SERPL CALC-SCNC: 13 MMOL/L (ref 7–16)
AST SERPL-CCNC: 23 U/L (ref 12–45)
BILIRUB SERPL-MCNC: 0.4 MG/DL (ref 0.1–1.5)
BUN SERPL-MCNC: 11 MG/DL (ref 8–22)
CALCIUM ALBUM COR SERPL-MCNC: 9 MG/DL (ref 8.5–10.5)
CALCIUM SERPL-MCNC: 9.5 MG/DL (ref 8.5–10.5)
CHLORIDE SERPL-SCNC: 94 MMOL/L (ref 96–112)
CHOLEST SERPL-MCNC: 195 MG/DL (ref 100–199)
CO2 SERPL-SCNC: 25 MMOL/L (ref 20–33)
CREAT SERPL-MCNC: 0.54 MG/DL (ref 0.5–1.4)
FOLATE SERPL-MCNC: 15.8 NG/ML
GFR SERPLBLD CREATININE-BSD FMLA CKD-EPI: 91 ML/MIN/1.73 M 2
GLOBULIN SER CALC-MCNC: 2.8 G/DL (ref 1.9–3.5)
GLUCOSE SERPL-MCNC: 95 MG/DL (ref 65–99)
HDLC SERPL-MCNC: 88 MG/DL
LDLC SERPL CALC-MCNC: 83 MG/DL
NT-PROBNP SERPL IA-MCNC: 174 PG/ML (ref 0–125)
POTASSIUM SERPL-SCNC: 4.6 MMOL/L (ref 3.6–5.5)
PROT SERPL-MCNC: 7.4 G/DL (ref 6–8.2)
SODIUM SERPL-SCNC: 132 MMOL/L (ref 135–145)
TRIGL SERPL-MCNC: 118 MG/DL (ref 0–149)
TSH SERPL DL<=0.005 MIU/L-ACNC: 1.28 UIU/ML (ref 0.38–5.33)
VIT B12 SERPL-MCNC: >4000 PG/ML (ref 211–911)

## 2024-01-21 LAB
BACTERIA UR CULT: ABNORMAL
SIGNIFICANT IND 70042: ABNORMAL
SITE SITE: ABNORMAL
SOURCE SOURCE: ABNORMAL

## 2024-02-10 PROBLEM — N39.0 URINARY TRACT INFECTION WITHOUT HEMATURIA: Chronic | Status: ACTIVE | Noted: 2024-02-08

## 2024-02-10 PROBLEM — N39.0 URINARY TRACT INFECTION WITHOUT HEMATURIA: Status: ACTIVE | Noted: 2024-02-08

## 2024-02-10 PROBLEM — R79.89 ELEVATED VITAMIN B12 LEVEL: Status: ACTIVE | Noted: 2024-02-08

## 2024-02-10 PROBLEM — R74.8 ELEVATED VITAMIN B12 LEVEL: Status: ACTIVE | Noted: 2024-02-08

## 2024-02-10 PROBLEM — E78.5 DYSLIPIDEMIA: Chronic | Status: ACTIVE | Noted: 2018-11-15

## 2024-04-01 PROBLEM — L89.302 PRESSURE INJURY OF BUTTOCK, STAGE 2 (HCC): Status: ACTIVE | Noted: 2024-04-01

## 2024-04-01 PROBLEM — G89.4 CHRONIC PAIN SYNDROME: Chronic | Status: ACTIVE | Noted: 2023-04-26

## 2024-04-01 PROBLEM — M25.512 BILATERAL SHOULDER PAIN: Chronic | Status: ACTIVE | Noted: 2017-02-16

## 2024-04-01 PROBLEM — L89.302 PRESSURE INJURY OF BUTTOCK, STAGE 2 (HCC): Chronic | Status: ACTIVE | Noted: 2024-04-01

## 2024-04-01 PROBLEM — M25.511 BILATERAL SHOULDER PAIN: Chronic | Status: ACTIVE | Noted: 2017-02-16

## 2024-04-02 ENCOUNTER — HOME HEALTH ADMISSION (OUTPATIENT)
Dept: HOME HEALTH SERVICES | Facility: HOME HEALTHCARE | Age: 84
End: 2024-04-02
Payer: MEDICARE

## 2024-04-06 ENCOUNTER — HOME CARE VISIT (OUTPATIENT)
Dept: HOME HEALTH SERVICES | Facility: HOME HEALTHCARE | Age: 84
End: 2024-04-06
Payer: MEDICARE

## 2024-04-06 VITALS
HEIGHT: 66 IN | DIASTOLIC BLOOD PRESSURE: 70 MMHG | BODY MASS INDEX: 17.12 KG/M2 | SYSTOLIC BLOOD PRESSURE: 122 MMHG | OXYGEN SATURATION: 96 % | HEART RATE: 98 BPM | TEMPERATURE: 98.6 F | WEIGHT: 106.5 LBS | RESPIRATION RATE: 16 BRPM

## 2024-04-06 PROCEDURE — 665998 HH PPS REVENUE CREDIT

## 2024-04-06 PROCEDURE — 665999 HH PPS REVENUE DEBIT

## 2024-04-06 PROCEDURE — 665001 SOC-HOME HEALTH

## 2024-04-06 PROCEDURE — G0299 HHS/HOSPICE OF RN EA 15 MIN: HCPCS

## 2024-04-06 ASSESSMENT — ENCOUNTER SYMPTOMS
PAIN LOCATION - RELIEVING FACTORS: PAIN MEDICATION
PAIN SEVERITY GOAL: 0/10
PAIN LOCATION: RIGHT LEG
SHORTNESS OF BREATH: 1
SUBJECTIVE PAIN PROGRESSION: UNCHANGED
PAIN LOCATION: HIPS
DYSPNEA ACTIVITY LEVEL: AFTER AMBULATING MORE THAN 20 FT
HIGHEST PAIN SEVERITY IN PAST 24 HOURS: 0/10
DEBILITATING PAIN: 1
PAIN LOCATION - PAIN SEVERITY: 0/10
PAIN LOCATION - EXACERBATING FACTORS: EXERCISE
PAIN LOCATION - EXACERBATING FACTORS: EXERCISE
PAIN LOCATION - PAIN SEVERITY: 0/10
DRY SKIN: 1
PAIN LOCATION - RELIEVING FACTORS: PAIN MEDICATION
LOWEST PAIN SEVERITY IN PAST 24 HOURS: 0/10
FORGETFULNESS: 1
PERSON REPORTING PAIN: PATIENT
PAIN: 1

## 2024-04-06 ASSESSMENT — FIBROSIS 4 INDEX: FIB4 SCORE: 1.33

## 2024-04-06 ASSESSMENT — ACTIVITIES OF DAILY LIVING (ADL): OASIS_M1830: 03

## 2024-04-07 PROCEDURE — 665998 HH PPS REVENUE CREDIT

## 2024-04-07 PROCEDURE — 665999 HH PPS REVENUE DEBIT

## 2024-04-08 ENCOUNTER — DOCUMENTATION (OUTPATIENT)
Dept: MEDICAL GROUP | Facility: PHYSICIAN GROUP | Age: 84
End: 2024-04-08
Payer: MEDICARE

## 2024-04-08 PROCEDURE — 665998 HH PPS REVENUE CREDIT

## 2024-04-08 PROCEDURE — 665999 HH PPS REVENUE DEBIT

## 2024-04-08 NOTE — PROGRESS NOTES
Medication chart review for [facility] services    Received referral from [facility].   Medications reviewed  compared with discharge summary if available.  Discharge summary date, if applicable:   N/A    Current medication list per [facility]     Medication list one, patient is currently taking    Current Outpatient Medications:     HYDROcodone/acetaminophen, 1 Tablet, Oral, Q8HRS PRN    [START ON 4/11/2024] oxyCODONE ER, 9 mg, Oral, BID    amLODIPine, 5 mg, Oral, DAILY    docusate sodium, 100 mg, Oral, DAILY    lisinopril, 40 mg, Oral, DAILY    DULoxetine, 60 mg, Oral, DAILY      Medication list two, drugs that the patient has been prescribed or recommended to take by their healthcare provider on discharge summary  N/A    No Known Allergies    Labs     Lab Results   Component Value Date/Time    SODIUM 132 (L) 01/18/2024 11:48 AM    POTASSIUM 4.6 01/18/2024 11:48 AM    CHLORIDE 94 (L) 01/18/2024 11:48 AM    CO2 25 01/18/2024 11:48 AM    GLUCOSE 95 01/18/2024 11:48 AM    BUN 11 01/18/2024 11:48 AM    CREATININE 0.54 01/18/2024 11:48 AM    CREATININE 0.75 06/22/2010 12:00 AM    BUNCREATRAT 21 06/22/2010 12:00 AM    GLOMRATE >59 06/22/2010 12:00 AM     Lab Results   Component Value Date/Time    ALKPHOSPHAT 85 01/18/2024 11:48 AM    ASTSGOT 23 01/18/2024 11:48 AM    ALTSGPT 15 01/18/2024 11:48 AM    TBILIRUBIN 0.4 01/18/2024 11:48 AM    INR 1.04 02/17/2021 07:53 PM    ALBUMIN 4.6 01/18/2024 11:48 AM        Assessment for clinically significant drug interactions, drug omissions/additions, duplicative therapies.            CC   Jose Luis Landa A.P.R.N.  [address] [address]  Fax: [phone]    [facility]  Phone [phone] fax [phone]    This note was created using voice recognition software (Dragon). The accuracy of the dictation is limited by the abilities of the software. I have reviewed the note prior to signing, however some errors in grammar and context  are still possible. If you have any questions related to this note please do not hesitate to contact our office.

## 2024-04-09 ENCOUNTER — HOME CARE VISIT (OUTPATIENT)
Dept: HOME HEALTH SERVICES | Facility: HOME HEALTHCARE | Age: 84
End: 2024-04-09
Payer: MEDICARE

## 2024-04-09 VITALS
DIASTOLIC BLOOD PRESSURE: 82 MMHG | HEART RATE: 64 BPM | BODY MASS INDEX: 17.67 KG/M2 | WEIGHT: 107.8 LBS | TEMPERATURE: 98.1 F | RESPIRATION RATE: 16 BRPM | OXYGEN SATURATION: 99 % | SYSTOLIC BLOOD PRESSURE: 138 MMHG

## 2024-04-09 PROCEDURE — 665998 HH PPS REVENUE CREDIT

## 2024-04-09 PROCEDURE — 665999 HH PPS REVENUE DEBIT

## 2024-04-09 PROCEDURE — G0299 HHS/HOSPICE OF RN EA 15 MIN: HCPCS

## 2024-04-09 ASSESSMENT — ENCOUNTER SYMPTOMS
LAST BOWEL MOVEMENT: 66939
VOMITING: DENIES
FORGETFULNESS: 1
MUSCLE WEAKNESS: 1
PERSON REPORTING PAIN: PATIENT
NAUSEA: DENIES
DENIES PAIN: 1

## 2024-04-09 ASSESSMENT — FIBROSIS 4 INDEX: FIB4 SCORE: 1.33

## 2024-04-09 ASSESSMENT — PATIENT HEALTH QUESTIONNAIRE - PHQ9: CLINICAL INTERPRETATION OF PHQ2 SCORE: 0

## 2024-04-10 PROCEDURE — 665998 HH PPS REVENUE CREDIT

## 2024-04-10 PROCEDURE — 665999 HH PPS REVENUE DEBIT

## 2024-04-11 PROCEDURE — 665999 HH PPS REVENUE DEBIT

## 2024-04-11 PROCEDURE — 665998 HH PPS REVENUE CREDIT

## 2024-04-12 ENCOUNTER — HOME CARE VISIT (OUTPATIENT)
Dept: HOME HEALTH SERVICES | Facility: HOME HEALTHCARE | Age: 84
End: 2024-04-12
Payer: MEDICARE

## 2024-04-12 VITALS
SYSTOLIC BLOOD PRESSURE: 128 MMHG | OXYGEN SATURATION: 97 % | TEMPERATURE: 98.1 F | RESPIRATION RATE: 16 BRPM | HEART RATE: 83 BPM | DIASTOLIC BLOOD PRESSURE: 76 MMHG

## 2024-04-12 PROCEDURE — 665999 HH PPS REVENUE DEBIT

## 2024-04-12 PROCEDURE — 665998 HH PPS REVENUE CREDIT

## 2024-04-12 PROCEDURE — G0299 HHS/HOSPICE OF RN EA 15 MIN: HCPCS

## 2024-04-12 ASSESSMENT — ENCOUNTER SYMPTOMS
FORGETFULNESS: 1
NAUSEA: DENIES
VOMITING: DENIES
LAST BOWEL MOVEMENT: 66941
MUSCLE WEAKNESS: 1

## 2024-04-12 ASSESSMENT — PATIENT HEALTH QUESTIONNAIRE - PHQ9: CLINICAL INTERPRETATION OF PHQ2 SCORE: 0

## 2024-04-13 PROCEDURE — 665999 HH PPS REVENUE DEBIT

## 2024-04-13 PROCEDURE — 665998 HH PPS REVENUE CREDIT

## 2024-04-14 PROCEDURE — 665999 HH PPS REVENUE DEBIT

## 2024-04-14 PROCEDURE — 665998 HH PPS REVENUE CREDIT

## 2024-04-15 ENCOUNTER — HOME CARE VISIT (OUTPATIENT)
Dept: HOME HEALTH SERVICES | Facility: HOME HEALTHCARE | Age: 84
End: 2024-04-15
Payer: MEDICARE

## 2024-04-15 PROCEDURE — 665998 HH PPS REVENUE CREDIT

## 2024-04-15 PROCEDURE — 665999 HH PPS REVENUE DEBIT

## 2024-04-15 NOTE — CASE COMMUNICATION
Quality Review for 4.6.24 SOC OASIS performed on by CARLOS Benton RN on 4.15.2024:    Edits completed by CARLOS Benton RN:  1.  and  dx coding updated per chart review.   2. Changed  to 4.6.24 per the LSOC order  3. Changed  to 1  4. Changed  A1 to 0 and B1 to 2 and  to 3 per [facility] provider reporting 2 full thickness PI. Added F1 to 1 per wound documentation.   5. Added heart healthy diet to nutritional requirements  per dx of HF  6.  Updated F2F data

## 2024-04-15 NOTE — CASE COMMUNICATION
I agree to these changes.   ----- Message -----  From: Deysi Benton R.N.  Sent: 4/15/2024   8:08 AM PDT  To: Rachel Mcneal R.N.      Quality Review for 4.6.24 SOC OASIS performed on by CARLOS Benton RN on 4.15.2024:    Edits completed by CARLOS Benton RN:  1.  and  dx coding updated per chart review.   2. Changed  to 4.6.24 per the LSOC order  3. Changed  to 1  4. Changed  A1 to 0 and B1 to 2 and  to 3 per [facility]  provider reporting 2 full thickness PI. Added F1 to 1 per wound documentation.   5. Added heart healthy diet to nutritional requirements per dx of HF  6.  Updated F2F data

## 2024-04-16 ENCOUNTER — HOME CARE VISIT (OUTPATIENT)
Dept: HOME HEALTH SERVICES | Facility: HOME HEALTHCARE | Age: 84
End: 2024-04-16
Payer: MEDICARE

## 2024-04-16 VITALS
TEMPERATURE: 98.3 F | SYSTOLIC BLOOD PRESSURE: 122 MMHG | RESPIRATION RATE: 16 BRPM | WEIGHT: 103.8 LBS | DIASTOLIC BLOOD PRESSURE: 74 MMHG | OXYGEN SATURATION: 96 % | HEART RATE: 88 BPM | BODY MASS INDEX: 17.01 KG/M2

## 2024-04-16 PROCEDURE — G0299 HHS/HOSPICE OF RN EA 15 MIN: HCPCS

## 2024-04-16 PROCEDURE — 665999 HH PPS REVENUE DEBIT

## 2024-04-16 PROCEDURE — 665998 HH PPS REVENUE CREDIT

## 2024-04-16 ASSESSMENT — ENCOUNTER SYMPTOMS
VOMITING: DENIES
DENIES PAIN: 1
PERSON REPORTING PAIN: PATIENT
LAST BOWEL MOVEMENT: 66946
NAUSEA: DENIES

## 2024-04-16 ASSESSMENT — FIBROSIS 4 INDEX: FIB4 SCORE: 1.33

## 2024-04-17 PROCEDURE — 665998 HH PPS REVENUE CREDIT

## 2024-04-17 PROCEDURE — 665999 HH PPS REVENUE DEBIT

## 2024-04-18 ENCOUNTER — HOME CARE VISIT (OUTPATIENT)
Dept: HOME HEALTH SERVICES | Facility: HOME HEALTHCARE | Age: 84
End: 2024-04-18
Payer: MEDICARE

## 2024-04-18 VITALS
HEART RATE: 86 BPM | OXYGEN SATURATION: 96 % | BODY MASS INDEX: 16.98 KG/M2 | RESPIRATION RATE: 16 BRPM | TEMPERATURE: 98.2 F | SYSTOLIC BLOOD PRESSURE: 128 MMHG | WEIGHT: 103.6 LBS | DIASTOLIC BLOOD PRESSURE: 72 MMHG

## 2024-04-18 PROCEDURE — 665998 HH PPS REVENUE CREDIT

## 2024-04-18 PROCEDURE — 665999 HH PPS REVENUE DEBIT

## 2024-04-18 PROCEDURE — G0299 HHS/HOSPICE OF RN EA 15 MIN: HCPCS

## 2024-04-18 ASSESSMENT — ENCOUNTER SYMPTOMS
NAUSEA: DENIES
DENIES PAIN: 1
PERSON REPORTING PAIN: PATIENT
LAST BOWEL MOVEMENT: 66948
VOMITING: DENIES

## 2024-04-18 ASSESSMENT — FIBROSIS 4 INDEX: FIB4 SCORE: 1.33

## 2024-04-19 PROCEDURE — 665998 HH PPS REVENUE CREDIT

## 2024-04-19 PROCEDURE — 665999 HH PPS REVENUE DEBIT

## 2024-04-20 PROCEDURE — 665999 HH PPS REVENUE DEBIT

## 2024-04-20 PROCEDURE — 665998 HH PPS REVENUE CREDIT

## 2024-04-21 PROCEDURE — 665998 HH PPS REVENUE CREDIT

## 2024-04-21 PROCEDURE — 665999 HH PPS REVENUE DEBIT

## 2024-04-22 PROCEDURE — 665999 HH PPS REVENUE DEBIT

## 2024-04-22 PROCEDURE — 665998 HH PPS REVENUE CREDIT

## 2024-04-23 ENCOUNTER — HOME CARE VISIT (OUTPATIENT)
Dept: HOME HEALTH SERVICES | Facility: HOME HEALTHCARE | Age: 84
End: 2024-04-23
Payer: MEDICARE

## 2024-04-23 VITALS
BODY MASS INDEX: 17.24 KG/M2 | SYSTOLIC BLOOD PRESSURE: 106 MMHG | DIASTOLIC BLOOD PRESSURE: 80 MMHG | OXYGEN SATURATION: 97 % | RESPIRATION RATE: 16 BRPM | WEIGHT: 105.2 LBS | HEART RATE: 89 BPM | TEMPERATURE: 98.3 F

## 2024-04-23 PROCEDURE — G0299 HHS/HOSPICE OF RN EA 15 MIN: HCPCS

## 2024-04-23 PROCEDURE — 665998 HH PPS REVENUE CREDIT

## 2024-04-23 PROCEDURE — 665999 HH PPS REVENUE DEBIT

## 2024-04-23 ASSESSMENT — ENCOUNTER SYMPTOMS
MUSCLE WEAKNESS: 1
SUBJECTIVE PAIN PROGRESSION: UNCHANGED
NAUSEA: PT CURRENTLY DENIES ANY NAUSEA
PAIN SEVERITY GOAL: 0/10
LOWEST PAIN SEVERITY IN PAST 24 HOURS: 0/10
PERSON REPORTING PAIN: PATIENT
STOOL FREQUENCY: LESS THAN DAILY
LAST BOWEL MOVEMENT: 66952
DENIES PAIN: 1
BOWEL PATTERN NORMAL: 1
VOMITING: PT CURRENTLY DENIES ANY VOMMITING
HIGHEST PAIN SEVERITY IN PAST 24 HOURS: 0/10

## 2024-04-23 ASSESSMENT — PATIENT HEALTH QUESTIONNAIRE - PHQ9: CLINICAL INTERPRETATION OF PHQ2 SCORE: 0

## 2024-04-23 ASSESSMENT — ACTIVITIES OF DAILY LIVING (ADL)
AMBULATION ASSISTANCE: STAND BY ASSIST
CURRENT_FUNCTION: STAND BY ASSIST

## 2024-04-23 ASSESSMENT — FIBROSIS 4 INDEX: FIB4 SCORE: 1.33

## 2024-04-24 ENCOUNTER — HOME CARE VISIT (OUTPATIENT)
Dept: HOME HEALTH SERVICES | Facility: HOME HEALTHCARE | Age: 84
End: 2024-04-24
Payer: MEDICARE

## 2024-04-24 PROCEDURE — 665999 HH PPS REVENUE DEBIT

## 2024-04-24 PROCEDURE — 665998 HH PPS REVENUE CREDIT

## 2024-04-25 PROCEDURE — 665998 HH PPS REVENUE CREDIT

## 2024-04-25 PROCEDURE — 665999 HH PPS REVENUE DEBIT

## 2024-04-26 ENCOUNTER — HOME CARE VISIT (OUTPATIENT)
Dept: HOME HEALTH SERVICES | Facility: HOME HEALTHCARE | Age: 84
End: 2024-04-26
Payer: MEDICARE

## 2024-04-26 VITALS
DIASTOLIC BLOOD PRESSURE: 68 MMHG | RESPIRATION RATE: 16 BRPM | SYSTOLIC BLOOD PRESSURE: 118 MMHG | TEMPERATURE: 98.3 F | HEART RATE: 84 BPM | OXYGEN SATURATION: 97 %

## 2024-04-26 PROCEDURE — 665999 HH PPS REVENUE DEBIT

## 2024-04-26 PROCEDURE — G0299 HHS/HOSPICE OF RN EA 15 MIN: HCPCS

## 2024-04-26 PROCEDURE — 665998 HH PPS REVENUE CREDIT

## 2024-04-26 ASSESSMENT — ENCOUNTER SYMPTOMS
DENIES PAIN: 1
NAUSEA: DENIES
PERSON REPORTING PAIN: PATIENT
LAST BOWEL MOVEMENT: 66955
VOMITING: DENIES

## 2024-04-27 PROCEDURE — 665998 HH PPS REVENUE CREDIT

## 2024-04-27 PROCEDURE — 665999 HH PPS REVENUE DEBIT

## 2024-04-28 PROCEDURE — 665999 HH PPS REVENUE DEBIT

## 2024-04-28 PROCEDURE — 665998 HH PPS REVENUE CREDIT

## 2024-04-29 PROCEDURE — 665998 HH PPS REVENUE CREDIT

## 2024-04-29 PROCEDURE — 665999 HH PPS REVENUE DEBIT

## 2024-05-01 ENCOUNTER — HOME CARE VISIT (OUTPATIENT)
Dept: HOME HEALTH SERVICES | Facility: HOME HEALTHCARE | Age: 84
End: 2024-05-01
Payer: MEDICARE

## 2024-05-01 VITALS
TEMPERATURE: 98.2 F | BODY MASS INDEX: 17.16 KG/M2 | HEART RATE: 86 BPM | SYSTOLIC BLOOD PRESSURE: 126 MMHG | OXYGEN SATURATION: 96 % | RESPIRATION RATE: 16 BRPM | DIASTOLIC BLOOD PRESSURE: 70 MMHG | WEIGHT: 104.7 LBS

## 2024-05-01 ASSESSMENT — ENCOUNTER SYMPTOMS
DENIES PAIN: 1
APPETITE LEVEL: GOOD
CHANGE IN APPETITE: UNCHANGED
NAUSEA: DENIES
LAST BOWEL MOVEMENT: 66961
VOMITING: DENIES
PERSON REPORTING PAIN: PATIENT

## 2024-05-01 ASSESSMENT — ACTIVITIES OF DAILY LIVING (ADL)
HOME_HEALTH_OASIS: 01
OASIS_M1830: 02

## 2024-05-01 ASSESSMENT — FIBROSIS 4 INDEX: FIB4 SCORE: 1.33

## 2024-05-05 ENCOUNTER — HOME CARE VISIT (OUTPATIENT)
Dept: HOME HEALTH SERVICES | Facility: HOME HEALTHCARE | Age: 84
End: 2024-05-05
Payer: MEDICARE

## 2024-05-05 NOTE — CASE COMMUNICATION
Quality Review Completed for OR OASIS by ANTONIETTA Cerda RN on 5/5/2024:     Edits completed by ANTONIETTA Cerda RN:  1.  changed to 0 per [id number] E and F is set up only, patient completes activity independently

## 2024-05-05 NOTE — Clinical Note
I agree with these changes.  Thank you,  Iliana Orantes RN  ----- Message -----  From: Dana Cerda R.N.  Sent: 5/5/2024   7:20 AM PDT  To: Iliana Orantes R.N.      Quality Review Completed for DC OASIS by ANTONIETTA Cerda RN on 5/5/2024:     Edits completed by ANTONIETTA Cerda RN:  1.  changed to 0 per [id number] E and F is set up only, patient completes activity independently

## 2024-05-13 PROBLEM — S32.82XD MULTIPLE CLOSED FRACTURES OF PELVIS WITHOUT DISRUPTION OF PELVIC RING WITH ROUTINE HEALING: Status: ACTIVE | Noted: 2021-02-18

## 2024-05-13 PROBLEM — S32.82XA MULTIPLE FRACTURES OF PELVIS (HCC): Status: ACTIVE | Noted: 2021-02-18

## 2024-05-13 PROBLEM — I10 ESSENTIAL HYPERTENSION: Chronic | Status: ACTIVE | Noted: 2017-02-23

## 2024-05-13 PROBLEM — S32.82XD MULTIPLE CLOSED FRACTURES OF PELVIS WITHOUT DISRUPTION OF PELVIC RING WITH ROUTINE HEALING: Chronic | Status: ACTIVE | Noted: 2021-02-18

## 2024-05-13 PROBLEM — L89.302 PRESSURE INJURY OF BUTTOCK, STAGE 2 (HCC): Chronic | Status: RESOLVED | Noted: 2024-04-01 | Resolved: 2024-05-13

## 2024-05-13 PROBLEM — G89.29 CHRONIC PAIN OF BOTH SHOULDERS: Status: ACTIVE | Noted: 2017-02-16

## 2024-06-15 PROBLEM — Z91.81 AT RISK FOR FALLS: Chronic | Status: ACTIVE | Noted: 2023-08-04

## 2024-08-12 PROBLEM — F32.5 MAJOR DEPRESSION IN REMISSION (HCC): Status: ACTIVE | Noted: 2024-08-12

## 2024-08-12 PROBLEM — F32.5 MAJOR DEPRESSION IN REMISSION (HCC): Chronic | Status: ACTIVE | Noted: 2024-08-12

## 2024-08-12 PROBLEM — L89.312 PRESSURE ULCER OF RIGHT BUTTOCK, STAGE 2 (HCC): Chronic | Status: ACTIVE | Noted: 2024-04-01

## 2024-08-12 PROBLEM — L89.312 PRESSURE ULCER OF RIGHT BUTTOCK, STAGE 2 (HCC): Status: ACTIVE | Noted: 2024-04-01

## 2025-01-05 PROBLEM — R79.89 ELEVATED VITAMIN B12 LEVEL: Chronic | Status: ACTIVE | Noted: 2024-02-08

## 2025-02-19 ENCOUNTER — HOSPITAL ENCOUNTER (OUTPATIENT)
Facility: MEDICAL CENTER | Age: 85
End: 2025-02-19
Attending: NURSE PRACTITIONER
Payer: MEDICARE

## 2025-02-19 DIAGNOSIS — I10 ESSENTIAL HYPERTENSION: Chronic | ICD-10-CM

## 2025-02-19 DIAGNOSIS — I11.0 HYPERTENSIVE HEART DISEASE WITH CONGESTIVE HEART FAILURE WITH PRESERVED LEFT VENTRICULAR FUNCTION (HCC): Chronic | ICD-10-CM

## 2025-02-19 DIAGNOSIS — Z79.899 HIGH RISK MEDICATION USE: ICD-10-CM

## 2025-02-19 DIAGNOSIS — R53.82 CHRONIC FATIGUE: ICD-10-CM

## 2025-02-19 DIAGNOSIS — E78.5 DYSLIPIDEMIA: Chronic | ICD-10-CM

## 2025-02-19 DIAGNOSIS — R64 CACHEXIA (HCC): Chronic | ICD-10-CM

## 2025-02-19 DIAGNOSIS — N30.00 ACUTE CYSTITIS WITHOUT HEMATURIA: Chronic | ICD-10-CM

## 2025-02-19 DIAGNOSIS — R63.4 WEIGHT LOSS: ICD-10-CM

## 2025-02-19 DIAGNOSIS — I50.30 HYPERTENSIVE HEART DISEASE WITH CONGESTIVE HEART FAILURE WITH PRESERVED LEFT VENTRICULAR FUNCTION (HCC): Chronic | ICD-10-CM

## 2025-02-19 DIAGNOSIS — E55.9 VITAMIN D DEFICIENCY: Chronic | ICD-10-CM

## 2025-02-19 LAB
BASOPHILS # BLD AUTO: 1.1 % (ref 0–1.8)
BASOPHILS # BLD: 0.08 K/UL (ref 0–0.12)
EOSINOPHIL # BLD AUTO: 0.19 K/UL (ref 0–0.51)
EOSINOPHIL NFR BLD: 2.6 % (ref 0–6.9)
ERYTHROCYTE [DISTWIDTH] IN BLOOD BY AUTOMATED COUNT: 46.3 FL (ref 35.9–50)
HCT VFR BLD AUTO: 40.1 % (ref 37–47)
HGB BLD-MCNC: 12.3 G/DL (ref 12–16)
IMM GRANULOCYTES # BLD AUTO: 0.03 K/UL (ref 0–0.11)
IMM GRANULOCYTES NFR BLD AUTO: 0.4 % (ref 0–0.9)
LYMPHOCYTES # BLD AUTO: 1.9 K/UL (ref 1–4.8)
LYMPHOCYTES NFR BLD: 25.9 % (ref 22–41)
MCH RBC QN AUTO: 29 PG (ref 27–33)
MCHC RBC AUTO-ENTMCNC: 30.7 G/DL (ref 32.2–35.5)
MCV RBC AUTO: 94.6 FL (ref 81.4–97.8)
MONOCYTES # BLD AUTO: 0.46 K/UL (ref 0–0.85)
MONOCYTES NFR BLD AUTO: 6.3 % (ref 0–13.4)
NEUTROPHILS # BLD AUTO: 4.67 K/UL (ref 1.82–7.42)
NEUTROPHILS NFR BLD: 63.7 % (ref 44–72)
NRBC # BLD AUTO: 0 K/UL
NRBC BLD-RTO: 0 /100 WBC (ref 0–0.2)
PLATELET # BLD AUTO: 292 K/UL (ref 164–446)
PMV BLD AUTO: 10.8 FL (ref 9–12.9)
RBC # BLD AUTO: 4.24 M/UL (ref 4.2–5.4)
WBC # BLD AUTO: 7.3 K/UL (ref 4.8–10.8)

## 2025-02-19 PROCEDURE — 82306 VITAMIN D 25 HYDROXY: CPT

## 2025-02-19 PROCEDURE — 87186 SC STD MICRODIL/AGAR DIL: CPT

## 2025-02-19 PROCEDURE — 84443 ASSAY THYROID STIM HORMONE: CPT

## 2025-02-19 PROCEDURE — 80061 LIPID PANEL: CPT

## 2025-02-19 PROCEDURE — 83036 HEMOGLOBIN GLYCOSYLATED A1C: CPT

## 2025-02-19 PROCEDURE — 82607 VITAMIN B-12: CPT

## 2025-02-19 PROCEDURE — 82746 ASSAY OF FOLIC ACID SERUM: CPT

## 2025-02-19 PROCEDURE — 80053 COMPREHEN METABOLIC PANEL: CPT

## 2025-02-19 PROCEDURE — 87077 CULTURE AEROBIC IDENTIFY: CPT

## 2025-02-19 PROCEDURE — 85025 COMPLETE CBC W/AUTO DIFF WBC: CPT

## 2025-02-19 PROCEDURE — 81001 URINALYSIS AUTO W/SCOPE: CPT

## 2025-02-19 PROCEDURE — 87086 URINE CULTURE/COLONY COUNT: CPT

## 2025-02-20 LAB
25(OH)D3 SERPL-MCNC: 28 NG/ML (ref 30–100)
ALBUMIN SERPL BCP-MCNC: 3.9 G/DL (ref 3.2–4.9)
ALBUMIN/GLOB SERPL: 1.5 G/DL
ALP SERPL-CCNC: 87 U/L (ref 30–99)
ALT SERPL-CCNC: 16 U/L (ref 2–50)
ANION GAP SERPL CALC-SCNC: 10 MMOL/L (ref 7–16)
APPEARANCE UR: ABNORMAL
AST SERPL-CCNC: 23 U/L (ref 12–45)
BACTERIA #/AREA URNS HPF: ABNORMAL /HPF
BILIRUB SERPL-MCNC: 0.4 MG/DL (ref 0.1–1.5)
BILIRUB UR QL STRIP.AUTO: NEGATIVE
BUN SERPL-MCNC: 16 MG/DL (ref 8–22)
CALCIUM ALBUM COR SERPL-MCNC: 9.1 MG/DL (ref 8.5–10.5)
CALCIUM SERPL-MCNC: 9 MG/DL (ref 8.5–10.5)
CASTS URNS QL MICRO: ABNORMAL /LPF (ref 0–2)
CHLORIDE SERPL-SCNC: 103 MMOL/L (ref 96–112)
CHOLEST SERPL-MCNC: 184 MG/DL (ref 100–199)
CO2 SERPL-SCNC: 26 MMOL/L (ref 20–33)
COLOR UR: YELLOW
CREAT SERPL-MCNC: 0.64 MG/DL (ref 0.5–1.4)
EPITHELIAL CELLS 1715: ABNORMAL /HPF (ref 0–5)
EST. AVERAGE GLUCOSE BLD GHB EST-MCNC: 117 MG/DL
FOLATE SERPL-MCNC: 9.4 NG/ML
GFR SERPLBLD CREATININE-BSD FMLA CKD-EPI: 87 ML/MIN/1.73 M 2
GLOBULIN SER CALC-MCNC: 2.6 G/DL (ref 1.9–3.5)
GLUCOSE SERPL-MCNC: 72 MG/DL (ref 65–99)
GLUCOSE UR STRIP.AUTO-MCNC: NEGATIVE MG/DL
HBA1C MFR BLD: 5.7 % (ref 4–5.6)
HDLC SERPL-MCNC: 70 MG/DL
KETONES UR STRIP.AUTO-MCNC: NEGATIVE MG/DL
LDLC SERPL CALC-MCNC: 93 MG/DL
LEUKOCYTE ESTERASE UR QL STRIP.AUTO: ABNORMAL
MICRO URNS: ABNORMAL
NITRITE UR QL STRIP.AUTO: NEGATIVE
PH UR STRIP.AUTO: 6.5 [PH] (ref 5–8)
POTASSIUM SERPL-SCNC: 3.9 MMOL/L (ref 3.6–5.5)
PROT SERPL-MCNC: 6.5 G/DL (ref 6–8.2)
PROT UR QL STRIP: NEGATIVE MG/DL
RBC # URNS HPF: ABNORMAL /HPF (ref 0–2)
RBC UR QL AUTO: NEGATIVE
SODIUM SERPL-SCNC: 139 MMOL/L (ref 135–145)
SP GR UR STRIP.AUTO: 1.02
TRIGL SERPL-MCNC: 103 MG/DL (ref 0–149)
TSH SERPL DL<=0.005 MIU/L-ACNC: 1.76 UIU/ML (ref 0.38–5.33)
UROBILINOGEN UR STRIP.AUTO-MCNC: 1 EU/DL
VIT B12 SERPL-MCNC: 572 PG/ML (ref 211–911)
WBC #/AREA URNS HPF: ABNORMAL /HPF

## 2025-04-20 PROBLEM — R26.2 AMBULATORY DYSFUNCTION: Status: ACTIVE | Noted: 2025-04-17

## 2025-05-01 ENCOUNTER — HOSPITAL ENCOUNTER (OUTPATIENT)
Facility: MEDICAL CENTER | Age: 85
End: 2025-05-01
Attending: NURSE PRACTITIONER
Payer: MEDICARE

## 2025-05-01 DIAGNOSIS — R30.0 DYSURIA: ICD-10-CM

## 2025-05-01 DIAGNOSIS — R64 CACHEXIA (HCC): Chronic | ICD-10-CM

## 2025-05-01 DIAGNOSIS — I10 ESSENTIAL HYPERTENSION: Chronic | ICD-10-CM

## 2025-05-01 LAB
ALBUMIN SERPL BCP-MCNC: 3 G/DL (ref 3.2–4.9)
ALBUMIN/GLOB SERPL: 1 G/DL
ALP SERPL-CCNC: 124 U/L (ref 30–99)
ALT SERPL-CCNC: 19 U/L (ref 2–50)
ANION GAP SERPL CALC-SCNC: 13 MMOL/L (ref 7–16)
APPEARANCE UR: ABNORMAL
AST SERPL-CCNC: 25 U/L (ref 12–45)
BACTERIA #/AREA URNS HPF: ABNORMAL /HPF
BASOPHILS # BLD AUTO: 0.5 % (ref 0–1.8)
BASOPHILS # BLD: 0.07 K/UL (ref 0–0.12)
BILIRUB SERPL-MCNC: 0.4 MG/DL (ref 0.1–1.5)
BILIRUB UR QL STRIP.AUTO: NEGATIVE
BUN SERPL-MCNC: 10 MG/DL (ref 8–22)
CALCIUM ALBUM COR SERPL-MCNC: 9.3 MG/DL (ref 8.5–10.5)
CALCIUM SERPL-MCNC: 8.5 MG/DL (ref 8.5–10.5)
CASTS URNS QL MICRO: ABNORMAL /LPF (ref 0–2)
CHLORIDE SERPL-SCNC: 99 MMOL/L (ref 96–112)
CO2 SERPL-SCNC: 28 MMOL/L (ref 20–33)
COLOR UR: YELLOW
CREAT SERPL-MCNC: 0.69 MG/DL (ref 0.5–1.4)
EOSINOPHIL # BLD AUTO: 0.09 K/UL (ref 0–0.51)
EOSINOPHIL NFR BLD: 0.6 % (ref 0–6.9)
EPITHELIAL CELLS 1715: ABNORMAL /HPF (ref 0–5)
ERYTHROCYTE [DISTWIDTH] IN BLOOD BY AUTOMATED COUNT: 51.3 FL (ref 35.9–50)
GFR SERPLBLD CREATININE-BSD FMLA CKD-EPI: 85 ML/MIN/1.73 M 2
GLOBULIN SER CALC-MCNC: 2.9 G/DL (ref 1.9–3.5)
GLUCOSE SERPL-MCNC: 74 MG/DL (ref 65–99)
GLUCOSE UR STRIP.AUTO-MCNC: NEGATIVE MG/DL
HCT VFR BLD AUTO: 36.9 % (ref 37–47)
HGB BLD-MCNC: 11.3 G/DL (ref 12–16)
IMM GRANULOCYTES # BLD AUTO: 0.16 K/UL (ref 0–0.11)
IMM GRANULOCYTES NFR BLD AUTO: 1.1 % (ref 0–0.9)
KETONES UR STRIP.AUTO-MCNC: NEGATIVE MG/DL
LEUKOCYTE ESTERASE UR QL STRIP.AUTO: ABNORMAL
LYMPHOCYTES # BLD AUTO: 1.13 K/UL (ref 1–4.8)
LYMPHOCYTES NFR BLD: 8.1 % (ref 22–41)
MCH RBC QN AUTO: 27.8 PG (ref 27–33)
MCHC RBC AUTO-ENTMCNC: 30.6 G/DL (ref 32.2–35.5)
MCV RBC AUTO: 90.9 FL (ref 81.4–97.8)
MICRO URNS: ABNORMAL
MONOCYTES # BLD AUTO: 0.92 K/UL (ref 0–0.85)
MONOCYTES NFR BLD AUTO: 6.6 % (ref 0–13.4)
NEUTROPHILS # BLD AUTO: 11.55 K/UL (ref 1.82–7.42)
NEUTROPHILS NFR BLD: 83.1 % (ref 44–72)
NITRITE UR QL STRIP.AUTO: POSITIVE
NRBC # BLD AUTO: 0 K/UL
NRBC BLD-RTO: 0 /100 WBC (ref 0–0.2)
PH UR STRIP.AUTO: 6.5 [PH] (ref 5–8)
PLATELET # BLD AUTO: 380 K/UL (ref 164–446)
PMV BLD AUTO: 10.7 FL (ref 9–12.9)
POTASSIUM SERPL-SCNC: 3.8 MMOL/L (ref 3.6–5.5)
PROT SERPL-MCNC: 5.9 G/DL (ref 6–8.2)
PROT UR QL STRIP: 30 MG/DL
RBC # BLD AUTO: 4.06 M/UL (ref 4.2–5.4)
RBC # URNS HPF: ABNORMAL /HPF (ref 0–2)
RBC UR QL AUTO: ABNORMAL
SODIUM SERPL-SCNC: 140 MMOL/L (ref 135–145)
SP GR UR STRIP.AUTO: 1.01
UROBILINOGEN UR STRIP.AUTO-MCNC: 0.2 EU/DL
WBC # BLD AUTO: 13.9 K/UL (ref 4.8–10.8)
WBC #/AREA URNS HPF: ABNORMAL /HPF

## 2025-05-01 PROCEDURE — 85025 COMPLETE CBC W/AUTO DIFF WBC: CPT

## 2025-05-01 PROCEDURE — 87186 SC STD MICRODIL/AGAR DIL: CPT

## 2025-05-01 PROCEDURE — 81001 URINALYSIS AUTO W/SCOPE: CPT

## 2025-05-01 PROCEDURE — 80053 COMPREHEN METABOLIC PANEL: CPT

## 2025-05-01 PROCEDURE — 87086 URINE CULTURE/COLONY COUNT: CPT

## 2025-05-01 PROCEDURE — 87077 CULTURE AEROBIC IDENTIFY: CPT

## 2025-05-29 PROBLEM — E44.1 MILD PROTEIN-CALORIE MALNUTRITION (HCC): Status: ACTIVE | Noted: 2025-05-29

## 2025-05-29 PROBLEM — Z86.19 HISTORY OF INFECTION DUE TO ESBL ESCHERICHIA COLI: Status: ACTIVE | Noted: 2025-05-28

## 2025-06-13 ENCOUNTER — TELEPHONE (OUTPATIENT)
Dept: FAMILY PLANNING/WOMEN'S HEALTH CLINIC | Facility: PHYSICIAN GROUP | Age: 85
End: 2025-06-13
Payer: MEDICARE

## (undated) DEVICE — SET LEADWIRE 5 LEAD BEDSIDE DISPOSABLE ECG (1SET OF 5/EA)

## (undated) DEVICE — CANISTER SUCTION RIGID RED 1500CC (40EA/CA)

## (undated) DEVICE — GLOVE, LITE (PAIR)

## (undated) DEVICE — TUBE CONNECTING SUCTION - CLEAR PLASTIC STERILE 72 IN (50EA/CA)

## (undated) DEVICE — ELECTRODE 850 FOAM ADHESIVE - HYDROGEL RADIOTRNSPRNT (50/PK)

## (undated) DEVICE — Device

## (undated) DEVICE — GOWN WARMING STANDARD FLEX - (30/CA)

## (undated) DEVICE — TRAY SRGPRP PVP IOD WT PRP - (20/CA)

## (undated) DEVICE — KIT ANESTHESIA W/CIRCUIT & 3/LT BAG W/FILTER (20EA/CA)

## (undated) DEVICE — SET EXTENSION WITH 2 PORTS (48EA/CA) ***PART #2C8610 IS A SUBSTITUTE*****

## (undated) DEVICE — LACTATED RINGERS INJ 1000 ML - (14EA/CA 60CA/PF)

## (undated) DEVICE — SUTURE ULTRATAPE COBRAID BLUE 2MM (6EA/BX)

## (undated) DEVICE — HEAD HOLDER JUNIOR/ADULT

## (undated) DEVICE — GLOVE BIOGEL SZ 8 SURGICAL PF LTX - (50PR/BX 4BX/CA)

## (undated) DEVICE — CHLORAPREP 26 ML APPLICATOR - ORANGE TINT(25/CA)

## (undated) DEVICE — SHAVER4.0 AGGRESSIVE + FORMLA (5EA/BX)

## (undated) DEVICE — WATER IRRIGATION STERILE 1000ML (12EA/CA)

## (undated) DEVICE — PROTECTOR ULNA NERVE - (36PR/CA)

## (undated) DEVICE — TUBING DAY USE W/CARTRIDGE (10EA/BX)

## (undated) DEVICE — ARTHROWAND TURBOVAC 3.5/90 SCT

## (undated) DEVICE — MASK ANESTHESIA ADULT  - (100/CA)

## (undated) DEVICE — SHAVER, 5.5 RESECTOR

## (undated) DEVICE — PACK SHOULDER ARTHROSCOPY SM - (2EA/CA)

## (undated) DEVICE — BAG, SPONGE COUNT 50600

## (undated) DEVICE — DRAPETIBURON SHOULDER W/POUCH - (5EA/CA)

## (undated) DEVICE — SUTURE GENERAL

## (undated) DEVICE — SODIUM CHL. IRRIGATION 0.9% 3000ML (4EA/CA 65CA/PF)

## (undated) DEVICE — SUTURE ULTRATAPE BLUE 2MM (6EA/BX)

## (undated) DEVICE — CANISTER SUCTION 3000ML MECHANICAL FILTER AUTO SHUTOFF MEDI-VAC NONSTERILE LF DISP  (40EA/CA)

## (undated) DEVICE — SODIUM CHL IRRIGATION 0.9% 1000ML (12EA/CA)

## (undated) DEVICE — PAD SANITARY 11IN MAXI IND WRAPPED  (12EA/PK 24PK/CA)

## (undated) DEVICE — TUBE E-T HI-LO CUFF 7.0MM (10EA/PK)

## (undated) DEVICE — GLOVE BIOGEL PI ORTHO SZ 6 1/2 SURGICAL PF LF (40PR/BX)

## (undated) DEVICE — SUCTION INSTRUMENT YANKAUER BULBOUS TIP W/O VENT (50EA/CA)

## (undated) DEVICE — CANNULA KIT UNIV GREY - (10/BX)

## (undated) DEVICE — SUTURE 3-0 ETHILON FS-1 - (36/BX) 30 INCH

## (undated) DEVICE — DETERGENT RENUZYME PLUS 10 OZ PACKET (50/BX)

## (undated) DEVICE — TUBING CASSETTE CROSSFLOW INTEGRATED (10EA/CA)

## (undated) DEVICE — GLOVE BIOGEL SZ 6.5 SURGICAL PF LTX (50PR/BX 4BX/CA)

## (undated) DEVICE — ELECTRODE DUAL RETURN W/ CORD - (50/PK)

## (undated) DEVICE — SENSOR SPO2 NEO LNCS ADHESIVE (20/BX) SEE USER NOTES

## (undated) DEVICE — HUMID-VENT HEAT AND MOISTURE EXCHANGE- (50/BX)

## (undated) DEVICE — GLOVE SURG LTX PF ORTHO 8 - CLASSIC (40PR/BX)

## (undated) DEVICE — KIT ROOM DECONTAMINATION

## (undated) DEVICE — SPIDER SHOULDER HOLDER (12EA/BX)

## (undated) DEVICE — TUBING CLEARLINK DUO-VENT - C-FLO (48EA/CA)

## (undated) DEVICE — NEEDLE WHITACRE SPINAL NON-SAFETY 22GA X 3 1/2 (25EA/BX)

## (undated) DEVICE — GLOVE BIOGEL PI ORTHO SZ 8 PF LF (40PR/BX)

## (undated) DEVICE — GLOVE BIOGEL PI INDICATOR SZ 6.5 SURGICAL PF LF - (50/BX 4BX/CA)

## (undated) DEVICE — GLOVE BIOGEL SZ 7 SURGICAL PF LTX - (50PR/BX 4BX/CA)

## (undated) DEVICE — CANNULA TWIST IN 8.25MM X 7CM (5/BX)